# Patient Record
Sex: FEMALE | Race: WHITE | NOT HISPANIC OR LATINO | Employment: OTHER | URBAN - NONMETROPOLITAN AREA
[De-identification: names, ages, dates, MRNs, and addresses within clinical notes are randomized per-mention and may not be internally consistent; named-entity substitution may affect disease eponyms.]

---

## 2017-01-03 ENCOUNTER — ANESTHESIA EVENT (OUTPATIENT)
Dept: GASTROENTEROLOGY | Facility: HOSPITAL | Age: 67
End: 2017-01-03

## 2017-01-04 ENCOUNTER — ANESTHESIA (OUTPATIENT)
Dept: GASTROENTEROLOGY | Facility: HOSPITAL | Age: 67
End: 2017-01-04

## 2017-01-04 PROCEDURE — 25010000002 PROPOFOL 10 MG/ML EMULSION: Performed by: NURSE ANESTHETIST, CERTIFIED REGISTERED

## 2017-01-04 RX ORDER — PROPOFOL 10 MG/ML
VIAL (ML) INTRAVENOUS AS NEEDED
Status: DISCONTINUED | OUTPATIENT
Start: 2017-01-04 | End: 2017-01-04 | Stop reason: SURG

## 2017-01-04 RX ORDER — LIDOCAINE HYDROCHLORIDE 20 MG/ML
INJECTION, SOLUTION INFILTRATION; PERINEURAL AS NEEDED
Status: DISCONTINUED | OUTPATIENT
Start: 2017-01-04 | End: 2017-01-04 | Stop reason: SURG

## 2017-01-04 RX ADMIN — PROPOFOL 20 MG: 10 INJECTION, EMULSION INTRAVENOUS at 10:51

## 2017-01-04 RX ADMIN — LIDOCAINE HYDROCHLORIDE 40 MG: 20 INJECTION, SOLUTION INFILTRATION; PERINEURAL at 10:49

## 2017-01-04 RX ADMIN — PROPOFOL 50 MG: 10 INJECTION, EMULSION INTRAVENOUS at 10:50

## 2017-01-04 RX ADMIN — PROPOFOL 30 MG: 10 INJECTION, EMULSION INTRAVENOUS at 10:52

## 2017-01-04 RX ADMIN — PROPOFOL 100 MG: 10 INJECTION, EMULSION INTRAVENOUS at 10:49

## 2017-01-04 NOTE — ANESTHESIA PREPROCEDURE EVALUATION
Anesthesia Evaluation     Patient summary reviewed and Nursing notes reviewed    No history of anesthetic complications   Airway   Mallampati: II  no difficulty expected  Dental      Pulmonary    Cardiovascular   Exercise tolerance: good (4-7 METS)  (+) hypertension, past MI  >12 months, CAD, CABG > 6 Months, cardiac stents more than 12 months ago     Patient on routine beta blocker and Beta blocker given within 24 hours of surgery  ROS comment: Echo reviewed from 10/2016. EF 50-60% mild pulm htn.    Neuro/Psych  (+) psychiatric history Anxiety,    GI/Hepatic/Renal/Endo    (+)  GERD poorly controlled, diabetes mellitus,     Musculoskeletal     Abdominal    Substance History - negative use     OB/GYN negative ob/gyn ROS         Other   (+) arthritis                        Anesthesia Plan    ASA 3     general and MAC     intravenous induction   Anesthetic plan and risks discussed with patient.

## 2017-01-04 NOTE — ANESTHESIA POSTPROCEDURE EVALUATION
Patient: Linda JOHNSON [facility]    Procedure Summary     Date Anesthesia Start Anesthesia Stop Room / Location    01/04/17 1039 1058 [facility] ENDOSCOPY 5 / [facility] ENDOSCOPY       Procedure Diagnosis Surgeon Provider    ESOPHAGOGASTRODUODENOSCOPY WITH ANESTHESIA (N/A Esophagus) Nausea  (Nausea [R11.0]) DO Vaishali Hilliard CRNA          Anesthesia Type: general, MAC  Last vitals  BP      Temp      Pulse     Resp      SpO2        Post Anesthesia Care and Evaluation    Patient location during evaluation: bedside  Patient participation: waiting for patient participation  Level of consciousness: sleepy but conscious  Pain score: 0  Pain management: adequate  Airway patency: patent  Anesthetic complications: no  Cardiovascular status: acceptable  Respiratory status: acceptable  Hydration status: acceptable

## 2017-01-20 ENCOUNTER — OFFICE VISIT (OUTPATIENT)
Dept: INTERNAL MEDICINE | Facility: CLINIC | Age: 67
End: 2017-01-20

## 2017-01-20 VITALS
WEIGHT: 182.4 LBS | OXYGEN SATURATION: 98 % | DIASTOLIC BLOOD PRESSURE: 78 MMHG | HEIGHT: 66 IN | BODY MASS INDEX: 29.32 KG/M2 | HEART RATE: 51 BPM | RESPIRATION RATE: 17 BRPM | SYSTOLIC BLOOD PRESSURE: 132 MMHG

## 2017-01-20 DIAGNOSIS — K21.9 GASTROESOPHAGEAL REFLUX DISEASE WITHOUT ESOPHAGITIS: ICD-10-CM

## 2017-01-20 DIAGNOSIS — E55.9 VITAMIN D DEFICIENCY: ICD-10-CM

## 2017-01-20 DIAGNOSIS — E11.42 TYPE 2 DIABETES MELLITUS WITH DIABETIC POLYNEUROPATHY, WITHOUT LONG-TERM CURRENT USE OF INSULIN (HCC): ICD-10-CM

## 2017-01-20 DIAGNOSIS — Z00.00 ENCOUNTER FOR PREVENTIVE HEALTH EXAMINATION: ICD-10-CM

## 2017-01-20 DIAGNOSIS — E78.2 MIXED HYPERLIPIDEMIA: ICD-10-CM

## 2017-01-20 DIAGNOSIS — I10 ESSENTIAL HYPERTENSION: Primary | ICD-10-CM

## 2017-01-20 DIAGNOSIS — F41.9 ANXIETY: ICD-10-CM

## 2017-01-20 DIAGNOSIS — K27.9 PEPTIC ULCER: ICD-10-CM

## 2017-01-20 DIAGNOSIS — F51.01 PRIMARY INSOMNIA: ICD-10-CM

## 2017-01-20 DIAGNOSIS — I25.118 CORONARY ARTERY DISEASE OF NATIVE ARTERY OF NATIVE HEART WITH STABLE ANGINA PECTORIS (HCC): ICD-10-CM

## 2017-01-20 PROCEDURE — 99214 OFFICE O/P EST MOD 30 MIN: CPT | Performed by: INTERNAL MEDICINE

## 2017-01-20 RX ORDER — CHOLECALCIFEROL (VITAMIN D3) 125 MCG
5 CAPSULE ORAL NIGHTLY PRN
Qty: 90 TABLET | Refills: 3 | Status: SHIPPED | OUTPATIENT
Start: 2017-01-20 | End: 2017-12-28

## 2017-01-20 RX ORDER — BLOOD-GLUCOSE METER
1 KIT MISCELLANEOUS 2 TIMES DAILY
Qty: 1 EACH | Refills: 11 | Status: SHIPPED | OUTPATIENT
Start: 2017-01-20 | End: 2018-09-19 | Stop reason: SDUPTHER

## 2017-01-20 RX ORDER — LORAZEPAM 2 MG/1
TABLET ORAL
Refills: 0 | COMMUNITY
Start: 2016-12-08 | End: 2017-01-20

## 2017-01-20 NOTE — PROGRESS NOTES
"CC: Follow-up hypertension and abdominal pain    History:  Linda Pickens is a 66 y.o. female who presents today for follow-up for evaluation of the above:  She reports she has been doing reasonably well, though she recently went through a long period of nausea and abdominal pain.  She [place] which revealed a mass in the abdomen, which she states she was told was almost certainly malignant.  However, local workup with imaging with both CT and MRI, which was reviewed, did not reveal anything to this  nausea degree.  However, she did have nausea and abdominal pain, which have since resolved.  However, she states \"everything in my body has changed.\"  She no longer enjoys the food she previously did.  However, she does not have the hot flashes she had prior to this illness.  She reports her blood pressure has been well controlled on her current medications without side effects and she also continues Lipitor for her lipids.      ROS:  Review of Systems   Constitutional: Negative for chills and fever.   HENT: Negative for congestion and sore throat.    Respiratory: Negative for cough and shortness of breath.    Cardiovascular: Negative for chest pain and palpitations.   Gastrointestinal: Negative for abdominal pain, constipation and nausea.       Ms. Pickens  reports that she quit smoking about 5 years ago. Her smoking use included Cigarettes. She does not have any smokeless tobacco history on file. She reports that she does not drink alcohol or use illicit drugs.      Current Outpatient Prescriptions:   •  amLODIPine (NORVASC) 10 MG tablet, Take 1 tablet by mouth Daily., Disp: 90 tablet, Rfl: 1  •  atenolol (TENORMIN) 50 MG tablet, Take 1 tablet by mouth Daily., Disp: 90 tablet, Rfl: 1  •  atorvastatin (LIPITOR) 40 MG tablet, Take 1 tablet by mouth Daily., Disp: 90 tablet, Rfl: 1  •  Blood Glucose Monitoring Suppl (ONE TOUCH ULTRA MINI) W/DEVICE kit, Inject 1 strip under the " "skin 2 (Two) Times a Day., Disp: , Rfl: 0  •  CloNIDine (CATAPRES) 0.1 MG tablet, Take 1 tablet by mouth 2 (Two) Times a Day., Disp: 180 tablet, Rfl: 1  •  clopidogrel (PLAVIX) 75 MG tablet, Take 1 tablet by mouth Daily., Disp: 90 tablet, Rfl: 1  •  hydrochlorothiazide (HYDRODIURIL) 25 MG tablet, Take 1 tablet by mouth Daily., Disp: 90 tablet, Rfl: 1  •  metFORMIN (GLUCOPHAGE) 1000 MG tablet, Take 1,000 mg by mouth 2 (Two) Times a Day With Meals., Disp: , Rfl:   •  nitroglycerin (NITROLINGUAL) 0.4 MG/SPRAY spray, Place 1 spray under the tongue Every 5 (Five) Minutes As Needed for chest pain., Disp: 12 g, Rfl: 2  •  ONE TOUCH ULTRA TEST test strip, 1 each by Other route 2 (Two) Times a Day., Disp: 180 each, Rfl: 1  •  pantoprazole (PROTONIX) 40 MG EC tablet, Take 1 tablet by mouth Daily., Disp: 90 tablet, Rfl: 1  •  venlafaxine (EFFEXOR) 37.5 MG tablet, Take 1 tablet by mouth 2 (Two) Times a Day., Disp: 180 tablet, Rfl: 1  •  aspirin 81 MG EC tablet, Take 1 tablet by mouth Daily., Disp: 90 tablet, Rfl: 1  •  LORazepam (ATIVAN) 2 MG tablet, , Disp: , Rfl: 0  •  promethazine (PHENERGAN) 25 MG tablet, Take 1 tablet by mouth Every 6 (Six) Hours As Needed for nausea or vomiting., Disp: 30 tablet, Rfl: 2      OBJECTIVE:  Visit Vitals   • /78 (BP Location: Left arm, Patient Position: Sitting, Cuff Size: Adult)   • Pulse 51   • Resp 17   • Ht 66\" (167.6 cm)   • Wt 182 lb 6.4 oz (82.7 kg)   • SpO2 98%   • BMI 29.44 kg/m2      Physical Exam   Constitutional: She is oriented to person, place, and time. She appears well-nourished. No distress.   Cardiovascular: Normal rate, regular rhythm and normal heart sounds.    No murmur heard.  Pulmonary/Chest: Effort normal and breath sounds normal. She has no wheezes.   Abdominal: Soft. There is no tenderness.   Neurological: She is alert and oriented to person, place, and time. Gait normal.   Psychiatric: She has a normal mood and affect.       Assessment/Plan    Diagnoses and all " orders for this visit:    Essential hypertension  -     Basic Metabolic Panel; Future  Well controlled, BP goal for age is <140/90 per JNC 8 guidelines and continue current medications    Mixed hyperlipidemia  -     Lipid Panel; Future  Currently on atorvastatin at 40 mg, which represents moderate to high intensity therapy per ACC/AHA guidelines.  We will plan lipid panel prior to her next visit.    Gastroesophageal reflux disease without esophagitis  Peptic ulcer  She has not been taking her PPI on a regular basis, though I recommended strongly that she do this to allow healing of her peptic ulcers as seen on EGD recently.  She expressed understanding.    Anxiety  She reports the Effexor is not controlling her anxiety as well as Zoloft did in the past.  However, she recently filled her Effexor and does not wish to waste this medication.  I recommended she continue to take her current dose daily as she has been doing, but to try weaning her last few doses to half tablets or every other day dosing to wean off.  Following this, we will attempt a drug holiday to see if her symptoms are well controlled without pharmacologic intervention.  However, if she requires treatment in the future, we would consider Zoloft, given its good profile for anxiety as well as good tolerance for her in the past.    Type 2 diabetes mellitus with diabetic polyneuropathy, without long-term current use of insulin  -     Hemoglobin A1c; Future  -     Blood Glucose Monitoring Suppl (ONE TOUCH ULTRA MINI) W/DEVICE kit; Inject 1 strip under the skin 2 (Two) Times a Day.  A1c was 7.9% in August 2016.  She does continue on metformin as monotherapy.  We will recheck A1c to determine need for ongoing or increased pharmacologic therapy.  She is on statin therapy and her blood pressure is at goal as above.    Vitamin D deficiency  -     Vitamin D 25 Hydroxy; Future  Given history of deficiency, though no current pharmacologic supplementation, we will  check current level to guide us in the need for further pharmacologic intervention.    Primary insomnia  -     melatonin 5 MG tablet tablet; Take 1 tablet by mouth At Night As Needed (insomnia).  She reports difficulties with sleeping.  She has taken Advil PM in the past for sleeping, though Advil is contraindicated given her recent discovery of peptic ulcers.  I recommended melatonin to be taken on a when necessary basis for insomnia and she may also use Benadryl as needed.  She has used a over-the-counter sleep aid, which she could continue as long as it is not NSAID or Benadryl based to prevent interactions with the recommendations given above.    Coronary artery disease of native artery of native heart with stable angina pectoris  I recommended she begin taking her PPI on a regular basis, and after 5-7 days, she may return to aspirin and Plavix therapy given her history of coronary disease.  She had to use 1 dose of nitroglycerin about 2 weeks ago, though that is rare and her angina resolved promptly.  She is also on statin and beta blocker therapy.    Encounter for preventive health examination  -     Hepatitis C Antibody; Future        An After Visit Summary was printed and given to the patient at discharge.  Return in about 4 months (around 5/20/2017) for Recheck. Sooner if problems arise.         Johny Lehman D.O. 1/20/2017

## 2017-01-20 NOTE — MR AVS SNAPSHOT
Linda JOHNSON [facility]   1/20/2017 10:30 AM   Office Visit    Dept Phone:  [phone]   Encounter #:  [id number]    Provider:  Jonhy Lehman DO   Department:  [facility]                Your Full Care Plan              Today's Medication Changes          These changes are accurate as of: 1/20/17 11:19 AM.  If you have any questions, ask your nurse or doctor.               New Medication(s)Ordered:     melatonin 5 MG tablet tablet   Take 1 tablet by mouth At Night As Needed (insomnia).   Started by:  Johny Lehman DO         Stop taking medication(s)listed here:     CloNIDine 0.1 MG tablet   Commonly known as:  CATAPRES   Stopped by:  Johny Lehman DO           LORazepam 2 MG tablet   Commonly known as:  ATIVAN   Stopped by:  Johny Lehman DO           promethazine 25 MG tablet   Commonly known as:  PHENERGAN   Stopped by:  Johny Lehman DO                Where to Get Your Medications      These medications were sent to [facility] [place] [place] - [address] [facility] - [phone] [facility] [phone]   [address] [place] [address]     Phone:  [phone]     melatonin 5 MG tablet tablet    ONE TOUCH ULTRA MINI W/DEVICE kit                  Your Updated Medication List          This list is accurate as of: 1/20/17 11:19 AM.  Always use your most recent med list.                amLODIPine 10 MG tablet   Commonly known as:  NORVASC   Take 1 tablet by mouth Daily.       aspirin 81 MG EC tablet   Take 1 tablet by mouth Daily.       atenolol 50 MG tablet   Commonly known as:  TENORMIN   Take 1 tablet by mouth Daily.       atorvastatin 40 MG tablet   Commonly known as:  LIPITOR   Take 1 tablet by mouth Daily.       clopidogrel 75 MG tablet   Commonly known as:  PLAVIX   Take 1 tablet by mouth Daily.       hydrochlorothiazide 25 MG tablet   Commonly known as:  HYDRODIURIL   Take 1 tablet by mouth Daily.       melatonin  5 MG tablet tablet   Take 1 tablet by mouth At Night As Needed (insomnia).       metFORMIN 1000 MG tablet   Commonly known as:  GLUCOPHAGE       nitroglycerin 0.4 MG/SPRAY spray   Commonly known as:  NITROLINGUAL   Place 1 spray under the tongue Every 5 (Five) Minutes As Needed for chest pain.       ONE TOUCH ULTRA MINI W/DEVICE kit   Inject 1 strip under the skin 2 (Two) Times a Day.       ONE TOUCH ULTRA TEST test strip   Generic drug:  glucose blood   1 each by Other route 2 (Two) Times a Day.       pantoprazole 40 MG EC tablet   Commonly known as:  PROTONIX   Take 1 tablet by mouth Daily.       venlafaxine 37.5 MG tablet   Commonly known as:  EFFEXOR   Take 1 tablet by mouth 2 (Two) Times a Day.               You Were Diagnosed With        Codes Comments    Mixed hyperlipidemia    -  Primary ICD-10-CM: E78.2  ICD-9-CM: 272.2     Essential hypertension     ICD-10-CM: I10  ICD-9-CM: 401.9     Gastroesophageal reflux disease without esophagitis     ICD-10-CM: K21.9  ICD-9-CM: 530.81     Anxiety     ICD-10-CM: F41.9  ICD-9-CM: 300.00     Type 2 diabetes mellitus with diabetic polyneuropathy, without long-term current use of insulin     ICD-10-CM: E11.42  ICD-9-CM: 250.60, 357.2     Vitamin D deficiency     ICD-10-CM: E55.9  ICD-9-CM: 268.9     Primary insomnia     ICD-10-CM: F51.01  ICD-9-CM: 307.42     Coronary artery disease of native artery of native heart with stable angina pectoris     ICD-10-CM: I25.118  ICD-9-CM: 414.01, 413.9     Encounter for preventive health examination     ICD-10-CM: Z00.00  ICD-9-CM: V70.0       Instructions     None    Patient Instructions History      Upcoming Appointments     Visit Type Date Time Department    FOLLOW UP 1/20/2017 10:30 AM [facility]    FOLLOW UP 2/13/2017  2:00 PM [facility]    FOLLOW UP 4/21/2017 10:30 AM [facility]    FOLLOW UP 5/22/2017 10:00 AM [facility]      MyChart Signup     Our records indicate that you have an active [facility]  "[employer] account.    You can view your After Visit Summary by going to [facility] and logging in with your [employer] username and password.  If you don't have a [employer] username and password but a parent or guardian has access to your record, the parent or guardian should login with their own [employer] username and password and access your record to view the After Visit Summary.    If you have questions, you can email [email or web address] call [phone] to talk to our [employer] staff.  Remember, [employer] is NOT to be used for urgent needs.  For medical emergencies, dial 911.               Other Info from Your Visit           Your Appointments     Feb 13, 2017  2:00 PM CST   Follow Up with Maico Shelton DO   [facility] GASTROENTEROLOGY (--)    [address]  [place] [address] [phone]           Arrive 15 minutes prior to appointment.            Apr 21, 2017 10:30 AM CDT   Follow Up with Johny Lehman DO   [facility] FAMILY MEDICINE (--)    [address]  [place] [address] [phone]           Arrive 15 minutes prior to appointment.            May 22, 2017 10:00 AM CDT   Follow Up with Carlos Mak MD   [facility] (--)    [address]  [place] [id number] [phone]           Arrive 15 minutes prior to appointment.              Allergies     Demerol [Meperidine]  GI Intolerance    Chantix [Varenicline]  Hallucinations    Meperidine And Related  GI Intolerance    Latex  Rash      Vital Signs     Blood Pressure Pulse Respirations Height Weight Oxygen Saturation    132/78 (BP Location: Left arm, Patient Position: Sitting, Cuff Size: Adult) 51 17 66\" (167.6 cm) 182 lb 6.4 oz (82.7 kg) 98%    Body Mass Index Smoking Status                29.44 kg/m2 Former Smoker          Problems and Diagnoses Noted     Anxiety problem    Coronary heart disease    High blood pressure "    Acid reflux disease    Mixed hyperlipidemia    Ulcer    Type 2 diabetes mellitus with diabetic polyneuropathy, without long-term current use of insulin    Vitamin D deficiency        Difficulty falling or staying asleep        Preventative health care

## 2017-02-07 ENCOUNTER — LAB (OUTPATIENT)
Dept: LAB | Facility: HOSPITAL | Age: 67
End: 2017-02-07

## 2017-02-07 DIAGNOSIS — I10 ESSENTIAL HYPERTENSION: ICD-10-CM

## 2017-02-07 DIAGNOSIS — E78.2 MIXED HYPERLIPIDEMIA: ICD-10-CM

## 2017-02-07 DIAGNOSIS — E55.9 VITAMIN D DEFICIENCY: ICD-10-CM

## 2017-02-07 DIAGNOSIS — Z00.00 ENCOUNTER FOR PREVENTIVE HEALTH EXAMINATION: ICD-10-CM

## 2017-02-07 DIAGNOSIS — E11.42 TYPE 2 DIABETES MELLITUS WITH DIABETIC POLYNEUROPATHY, WITHOUT LONG-TERM CURRENT USE OF INSULIN (HCC): ICD-10-CM

## 2017-02-07 LAB
25(OH)D3 SERPL-MCNC: 54.5 NG/ML (ref 30–100)
ANION GAP SERPL CALCULATED.3IONS-SCNC: 10 MMOL/L (ref 4–13)
ARTICHOKE IGE QN: 97 MG/DL (ref 0–99)
BUN BLD-MCNC: 12 MG/DL (ref 5–21)
BUN/CREAT SERPL: 17.4 (ref 7–25)
CALCIUM SPEC-SCNC: 9.1 MG/DL (ref 8.4–10.4)
CHLORIDE SERPL-SCNC: 100 MMOL/L (ref 98–110)
CHOLEST SERPL-MCNC: 157 MG/DL (ref 130–200)
CO2 SERPL-SCNC: 32 MMOL/L (ref 24–31)
CREAT BLD-MCNC: 0.69 MG/DL (ref 0.5–1.4)
GFR SERPL CREATININE-BSD FRML MDRD: 86 ML/MIN/1.73
GLUCOSE BLD-MCNC: 173 MG/DL (ref 70–100)
HBA1C MFR BLD: 6.7 %
HCV AB SER DONR QL: NEGATIVE
HCV S/C RATIO: 0.04 (ref 0–0.99)
HDLC SERPL-MCNC: 37 MG/DL
LDLC/HDLC SERPL: 2.58 {RATIO}
POTASSIUM BLD-SCNC: 3.9 MMOL/L (ref 3.5–5.3)
SODIUM BLD-SCNC: 142 MMOL/L (ref 135–145)
TRIGL SERPL-MCNC: 123 MG/DL (ref 0–149)

## 2017-02-07 PROCEDURE — 82306 VITAMIN D 25 HYDROXY: CPT | Performed by: INTERNAL MEDICINE

## 2017-02-07 PROCEDURE — 36415 COLL VENOUS BLD VENIPUNCTURE: CPT

## 2017-02-07 PROCEDURE — 83036 HEMOGLOBIN GLYCOSYLATED A1C: CPT | Performed by: INTERNAL MEDICINE

## 2017-02-07 PROCEDURE — 86803 HEPATITIS C AB TEST: CPT | Performed by: INTERNAL MEDICINE

## 2017-02-07 PROCEDURE — 80061 LIPID PANEL: CPT | Performed by: INTERNAL MEDICINE

## 2017-02-07 PROCEDURE — 80048 BASIC METABOLIC PNL TOTAL CA: CPT | Performed by: INTERNAL MEDICINE

## 2017-02-13 ENCOUNTER — DOCUMENTATION (OUTPATIENT)
Dept: INTERNAL MEDICINE | Facility: CLINIC | Age: 67
End: 2017-02-13

## 2017-02-13 ENCOUNTER — OFFICE VISIT (OUTPATIENT)
Dept: GASTROENTEROLOGY | Facility: CLINIC | Age: 67
End: 2017-02-13

## 2017-02-13 VITALS
DIASTOLIC BLOOD PRESSURE: 80 MMHG | RESPIRATION RATE: 17 BRPM | HEART RATE: 61 BPM | WEIGHT: 181.8 LBS | BODY MASS INDEX: 29.22 KG/M2 | OXYGEN SATURATION: 99 % | SYSTOLIC BLOOD PRESSURE: 170 MMHG | HEIGHT: 66 IN

## 2017-02-13 DIAGNOSIS — K21.00 GASTROESOPHAGEAL REFLUX DISEASE WITH ESOPHAGITIS: Primary | ICD-10-CM

## 2017-02-13 DIAGNOSIS — K25.9 GASTRIC ULCER, UNSPECIFIED CHRONICITY: ICD-10-CM

## 2017-02-13 DIAGNOSIS — I10 ESSENTIAL HYPERTENSION: Primary | ICD-10-CM

## 2017-02-13 PROCEDURE — 99213 OFFICE O/P EST LOW 20 MIN: CPT | Performed by: INTERNAL MEDICINE

## 2017-02-13 RX ORDER — LISINOPRIL 20 MG/1
20 TABLET ORAL DAILY
Qty: 30 TABLET | Refills: 5 | Status: SHIPPED | OUTPATIENT
Start: 2017-02-13 | End: 2017-04-21 | Stop reason: SDUPTHER

## 2017-02-13 NOTE — PROGRESS NOTES
Pt called and stated after she stopped her Clonidine, her BP has been quite elevated. We will start Lisinopril per orders. We will attempt to avoid further long-term therapy with Clonidine.

## 2017-02-13 NOTE — PROGRESS NOTES
"Chief Complaint   Patient presents with   • Post-op     1 month       Subjective     HPI     EGD 1/4/17 for evaluation of heartburn/indigestion/vomiting.  EGD showed esophagitis and non bleeding gastric ulcers.  H Pylori negative.  EGD reviewed with pt.  She reports improvement in emesis.  \"I feel like new person.\"  Currently taking Protonix on regular basis.  She has stopped use of NSAIDs and has not resumed ASA at this time.  Cardio is recommending restarting use of ASA.  Increase abdominal pain with caffeine consumption.  No melena/BRBPR.    Past Medical History   Diagnosis Date   • Anxiety    • Arthritis    • CAD (coronary artery disease)    • CHF (congestive heart failure)    • Depression    • Diabetes mellitus    • HTN (hypertension)    • PONV (postoperative nausea and vomiting)    • Vitamin D deficiency        Past Surgical History   Procedure Laterality Date   • Appendectomy     • Coronary artery bypass graft  1992     x 2   • Tonsillectomy     • Hernia repair     • Shoulder surgery     • Coronary stent placement  2014     X 5   • Cardiac catheterization       [place]   • Endoscopy N/A 1/4/2017     Procedure: ESOPHAGOGASTRODUODENOSCOPY WITH ANESTHESIA;  Surgeon: Maico Shelton DO;  Location: [facility] ENDOSCOPY;  Service:        Outpatient Prescriptions Marked as Taking for the 2/13/17 encounter (Office Visit) with Maico Shelton DO   Medication Sig Dispense Refill   • amLODIPine (NORVASC) 10 MG tablet Take 1 tablet by mouth Daily. 90 tablet 1   • aspirin 81 MG EC tablet Take 1 tablet by mouth Daily. 90 tablet 1   • atenolol (TENORMIN) 50 MG tablet Take 1 tablet by mouth Daily. 90 tablet 1   • atorvastatin (LIPITOR) 40 MG tablet Take 1 tablet by mouth Daily. 90 tablet 1   • Blood Glucose Monitoring Suppl (ONE TOUCH ULTRA MINI) W/DEVICE kit Inject 1 strip under the skin 2 (Two) Times a Day. 1 each 11   • clopidogrel (PLAVIX) 75 MG tablet Take 1 tablet by mouth Daily. 90 tablet 1   • " hydrochlorothiazide (HYDRODIURIL) 25 MG tablet Take 1 tablet by mouth Daily. 90 tablet 1   • melatonin 5 MG tablet tablet Take 1 tablet by mouth At Night As Needed (insomnia). 90 tablet 3   • nitroglycerin (NITROLINGUAL) 0.4 MG/SPRAY spray Place 1 spray under the tongue Every 5 (Five) Minutes As Needed for chest pain. 12 g 2   • ONE TOUCH ULTRA TEST test strip 1 each by Other route 2 (Two) Times a Day. 180 each 1   • pantoprazole (PROTONIX) 40 MG EC tablet Take 1 tablet by mouth Daily. 90 tablet 1   • venlafaxine (EFFEXOR) 37.5 MG tablet Take 1 tablet by mouth 2 (Two) Times a Day. 180 tablet 1       Allergies   Allergen Reactions   • Demerol [Meperidine] GI Intolerance   • Chantix [Varenicline] Hallucinations   • Meperidine And Related GI Intolerance   • Latex Rash       Social History     Social History   • Marital status:      Spouse name: N/A   • Number of children: N/A   • Years of education: N/A     Occupational History   • Not on file.     Social History Main Topics   • Smoking status: Former Smoker     Types: Cigarettes     Quit date: 1/1/2012   • Smokeless tobacco: Not on file   • Alcohol use No   • Drug use: No   • Sexual activity: Defer     Other Topics Concern   • Not on file     Social History Narrative       Family History   Problem Relation Age of Onset   • Breast cancer Mother    • Lung cancer Mother    • No Known Problems Father    • Arrhythmia Maternal Grandmother    • Heart disease Paternal Grandmother    • Diabetes Paternal Grandmother    • Heart disease Paternal Grandfather    • Diabetes Paternal Grandfather    • Lung cancer Maternal Grandfather    • Colon cancer Neg Hx    • Colon polyps Neg Hx    • Liver cancer Neg Hx    • Liver disease Neg Hx        Review of Systems   Constitutional: Negative for fatigue, fever and unexpected weight change.   HENT: Negative for hearing loss, sore throat and voice change.    Eyes: Negative for visual disturbance.   Respiratory: Negative for cough,  "shortness of breath and wheezing.    Cardiovascular: Negative for chest pain and palpitations.   Gastrointestinal: Negative for abdominal pain, blood in stool and vomiting.   Endocrine: Negative for polydipsia and polyuria.   Genitourinary: Negative for difficulty urinating, dysuria, hematuria and urgency.   Musculoskeletal: Negative for joint swelling and myalgias.   Skin: Negative for color change, rash and wound.   Neurological: Negative for dizziness, tremors, seizures and syncope.   Hematological: Does not bruise/bleed easily.   Psychiatric/Behavioral: Negative for agitation and confusion. The patient is not nervous/anxious.        Objective     Vitals:    02/13/17 1408   BP: 170/80   BP Location: Left arm   Patient Position: Sitting   Cuff Size: Adult   Pulse: 61   Resp: 17   SpO2: 99%   Weight: 181 lb 12.8 oz (82.5 kg)   Height: 66\" (167.6 cm)     Body mass index is 29.34 kg/(m^2).    Physical Exam   Constitutional: She is oriented to person, place, and time. She appears well-developed and well-nourished.   HENT:   Head: Normocephalic and atraumatic.   Eyes: Conjunctivae are normal. Pupils are equal, round, and reactive to light. No scleral icterus.   Neck: No JVD present. No thyroid mass and no thyromegaly present.   Cardiovascular: Normal rate, regular rhythm and normal heart sounds.  Exam reveals no gallop and no friction rub.    No murmur heard.  Pulmonary/Chest: Effort normal and breath sounds normal. No accessory muscle usage. No respiratory distress. She has no wheezes. She has no rales.   Abdominal: Soft. Bowel sounds are normal. She exhibits no distension, no ascites and no mass. There is no splenomegaly or hepatomegaly. There is no tenderness. There is no rebound and no guarding.   Genitourinary:   Genitourinary Comments: Rectal-Did not examine   Musculoskeletal: Normal range of motion. She exhibits no edema.   Neurological: She is alert and oriented to person, place, and time.   Deemed a reliable " historian, able to converse without difficulty and able to move all extremities without difficulty   Skin: Skin is warm and dry.   Psychiatric: She has a normal mood and affect. Her behavior is normal.       Imaging Results (most recent)     None          Assessment/Plan     Linda was seen today for post-op.    Diagnoses and all orders for this visit:    Gastroesophageal reflux disease with esophagitis    Gastric ulcer, unspecified chronicity      Avoid caffeine  Cont daily Protonix  Ok resume ASA  GERD handout given to patient    * Surgery not found *    There are no Patient Instructions on file for this visit.

## 2017-02-13 NOTE — PROGRESS NOTES
Patient comes in to the office stating that her blood pressure has been running high since stopping Clonidine. Values of 02/13/2017   179/90. 02/11/2017  159/74,  02/10/2017  187/94 and 01/31/2017 179/93.  Discussed with Dr Lehman  Patient to start Lisinopril.  Patient notified to keep record of blood pressure for next two weeks.

## 2017-03-09 ENCOUNTER — HOSPITAL ENCOUNTER (EMERGENCY)
Facility: HOSPITAL | Age: 67
Discharge: LEFT BEFORE DISCHARGE | End: 2017-03-09

## 2017-03-09 VITALS
DIASTOLIC BLOOD PRESSURE: 87 MMHG | RESPIRATION RATE: 16 BRPM | BODY MASS INDEX: 28.93 KG/M2 | HEART RATE: 76 BPM | HEIGHT: 66 IN | WEIGHT: 180 LBS | TEMPERATURE: 97.1 F | OXYGEN SATURATION: 97 % | SYSTOLIC BLOOD PRESSURE: 109 MMHG

## 2017-03-09 PROCEDURE — 99211 OFF/OP EST MAY X REQ PHY/QHP: CPT

## 2017-03-10 NOTE — ED NOTES
Pt states that she just wants something to open up her throat, pt states that she is having difficulty swollowing     Lissa Nino, PATTI  03/09/17 7042

## 2017-03-10 NOTE — ED NOTES
"Upon doing assessment pt stated that she ate a type of chips that she had never ate before, pt states that she go nauseaed, stated that her \"throat was trying to close, and my eyes swelled shut\", pt states that she went to the bathroom and vomited and was beating on the floor to get her 's attention, pt stated that she felt very weak inside,     Lissa Nino RN  03/09/17 8382    "

## 2017-03-10 NOTE — ED NOTES
Pt wants to leave. Pt states that she thinks she is having a stroke and wants to go to [facility] due to her not being seen yet, explained to pt that I informed the providers of her symptoms and pt states that she doesn't want to wait any longer,      Lissa Nino, RN  03/09/17 2709

## 2017-03-23 DIAGNOSIS — E11.9 TYPE 2 DIABETES MELLITUS WITHOUT COMPLICATION, WITHOUT LONG-TERM CURRENT USE OF INSULIN (HCC): ICD-10-CM

## 2017-03-23 NOTE — TELEPHONE ENCOUNTER
----- Message from Chetna Bruno sent at 3/23/2017  2:42 PM CDT -----  Regarding: Refill request  Contact: [phone]  Call in to [facility]:     Refill on the One touch ultra test strips.

## 2017-04-21 ENCOUNTER — OFFICE VISIT (OUTPATIENT)
Dept: INTERNAL MEDICINE | Facility: CLINIC | Age: 67
End: 2017-04-21

## 2017-04-21 VITALS
RESPIRATION RATE: 16 BRPM | OXYGEN SATURATION: 97 % | DIASTOLIC BLOOD PRESSURE: 78 MMHG | HEART RATE: 69 BPM | BODY MASS INDEX: 30.01 KG/M2 | WEIGHT: 186.7 LBS | SYSTOLIC BLOOD PRESSURE: 142 MMHG | HEIGHT: 66 IN

## 2017-04-21 DIAGNOSIS — Z78.0 POSTMENOPAUSAL: ICD-10-CM

## 2017-04-21 DIAGNOSIS — F41.9 ANXIETY: ICD-10-CM

## 2017-04-21 DIAGNOSIS — K21.9 GASTROESOPHAGEAL REFLUX DISEASE WITHOUT ESOPHAGITIS: ICD-10-CM

## 2017-04-21 DIAGNOSIS — E78.2 MIXED HYPERLIPIDEMIA: ICD-10-CM

## 2017-04-21 DIAGNOSIS — I10 ESSENTIAL HYPERTENSION: ICD-10-CM

## 2017-04-21 DIAGNOSIS — I73.9 CLAUDICATION IN PERIPHERAL VASCULAR DISEASE (HCC): ICD-10-CM

## 2017-04-21 DIAGNOSIS — Z87.891 PERSONAL HISTORY OF NICOTINE DEPENDENCE: ICD-10-CM

## 2017-04-21 DIAGNOSIS — Z23 ENCOUNTER FOR IMMUNIZATION: ICD-10-CM

## 2017-04-21 DIAGNOSIS — Z12.2 ENCOUNTER FOR SCREENING FOR LUNG CANCER: ICD-10-CM

## 2017-04-21 DIAGNOSIS — I25.118 CORONARY ARTERY DISEASE OF NATIVE ARTERY OF NATIVE HEART WITH STABLE ANGINA PECTORIS (HCC): Primary | ICD-10-CM

## 2017-04-21 DIAGNOSIS — E11.42 TYPE 2 DIABETES MELLITUS WITH DIABETIC POLYNEUROPATHY, WITHOUT LONG-TERM CURRENT USE OF INSULIN (HCC): ICD-10-CM

## 2017-04-21 PROBLEM — E66.09 NON MORBID OBESITY DUE TO EXCESS CALORIES: Status: ACTIVE | Noted: 2017-04-21

## 2017-04-21 PROCEDURE — G0438 PPPS, INITIAL VISIT: HCPCS | Performed by: INTERNAL MEDICINE

## 2017-04-21 PROCEDURE — 90471 IMMUNIZATION ADMIN: CPT | Performed by: INTERNAL MEDICINE

## 2017-04-21 PROCEDURE — 90670 PCV13 VACCINE IM: CPT | Performed by: INTERNAL MEDICINE

## 2017-04-21 PROCEDURE — 99214 OFFICE O/P EST MOD 30 MIN: CPT | Performed by: INTERNAL MEDICINE

## 2017-04-21 RX ORDER — LISINOPRIL 20 MG/1
20 TABLET ORAL DAILY
Qty: 90 TABLET | Refills: 3 | Status: SHIPPED | OUTPATIENT
Start: 2017-04-21 | End: 2017-08-17

## 2017-04-21 RX ORDER — VENLAFAXINE HYDROCHLORIDE 75 MG/1
75 CAPSULE, EXTENDED RELEASE ORAL DAILY
Qty: 90 CAPSULE | Refills: 1 | Status: SHIPPED | OUTPATIENT
Start: 2017-04-21 | End: 2017-11-17 | Stop reason: DRUGHIGH

## 2017-04-21 RX ORDER — TIZANIDINE 4 MG/1
1 TABLET ORAL AS NEEDED
Refills: 2 | COMMUNITY
Start: 2017-03-09 | End: 2017-06-27

## 2017-04-21 NOTE — PROGRESS NOTES
QUICK REFERENCE INFORMATION:  The ABCs of the Annual Wellness Visit    Initial Medicare Wellness Visit    HEALTH RISK ASSESSMENT    1950    Recent Hospitalizations:  No recent hospitalization(s)..        Current Medical Providers:  Patient Care Team:  Johny Lehman DO as PCP - General (Internal Medicine)  Carlos Mak MD as Cardiologist (Cardiology)  Eb Capps MD as Consulting Physician (Ophthalmology)        Smoking Status:  History   Smoking Status   • Former Smoker   • Types: Cigarettes   • Quit date: 1/1/2012   Smokeless Tobacco   • Not on file       Alcohol Consumption:  History   Alcohol Use No       Depression Screen:   PHQ-9 Depression Screening 4/21/2017   Little interest or pleasure in doing things 1   Feeling down, depressed, or hopeless 0   Trouble falling or staying asleep, or sleeping too much 2   Feeling tired or having little energy 2   Poor appetite or overeating 1   Feeling bad about yourself - or that you are a failure or have let yourself or your family down 0   Trouble concentrating on things, such as reading the newspaper or watching television 1   Moving or speaking so slowly that other people could have noticed. Or the opposite - being so fidgety or restless that you have been moving around a lot more than usual 0   Thoughts that you would be better off dead, or of hurting yourself in some way 0   PHQ-9 Total Score 7   If you checked off any problems, how difficult have these problems made it for you to do your work, take care of things at home, or get along with other people? Not difficult at all       Health Habits and Functional and Cognitive Screening:  Functional & Cognitive Status 4/21/2017   Do you have difficulty preparing food and eating? No   Do you have difficulty bathing yourself? No   Do you have difficulty getting dressed? No   Do you have difficulty using the toilet? No   Do you have difficulty moving around from place to place? No   In the past year have you  fallen or experienced a near fall? No   Do you need help using the phone?  No   Are you deaf or do you have serious difficulty hearing?  No   Do you need help with transportation? No   Do you need help shopping? No   Do you need help preparing meals?  No   Do you need help with housework?  No   Do you need help with laundry? No   Do you need help taking your medications? No   Do you need help managing money? No   Do you have difficulty concentrating, remembering or making decisions? No       Health Habits  Current Diet: Unhealthy Diet  Dental Exam: Not up to date  Eye Exam: Not up to date  Exercise (times per week): 1 times per week  Current Exercise Activities Include: Yard Work          Does the patient have evidence of cognitive impairment? No    Asiprin use counseling: Taking ASA appropriately as indicated      Recent Lab Results:    Visual Acuity:  No exam data present    Age-appropriate Screening Schedule:  Refer to the list below for future screening recommendations based on patient's age, sex and/or medical conditions. Orders for these recommended tests are listed in the plan section. The patient has been provided with a written plan.    Health Maintenance   Topic Date Due   • TDAP/TD VACCINES (1 - Tdap) 02/20/1971   • PAP SMEAR  10/17/2016   • ZOSTER VACCINE  10/17/2016   • DIABETIC FOOT EXAM  10/20/2016   • DIABETIC EYE EXAM  10/20/2016   • COLONOSCOPY  10/20/2016   • PNEUMOCOCCAL VACCINES (65+ LOW/MEDIUM RISK) (1 of 2 - PCV13) 03/16/2017   • HEMOGLOBIN A1C  08/07/2017   • URINE MICROALBUMIN  08/22/2017   • LIPID PANEL  02/07/2018   • MAMMOGRAM  03/07/2018   • INFLUENZA VACCINE  Completed        Subjective   History of Present Illness    Linda ELIZABETH Pickens is a 67 y.o. female who presents for an Annual Wellness Visit.    The following portions of the patient's history were reviewed and updated as appropriate: allergies, current medications, past family history, past medical history, past social history, past  surgical history and problem list.    Outpatient Medications Prior to Visit   Medication Sig Dispense Refill   • amLODIPine (NORVASC) 10 MG tablet Take 1 tablet by mouth Daily. 90 tablet 1   • aspirin 81 MG EC tablet Take 1 tablet by mouth Daily. 90 tablet 1   • atenolol (TENORMIN) 50 MG tablet Take 1 tablet by mouth Daily. 90 tablet 1   • atorvastatin (LIPITOR) 40 MG tablet Take 1 tablet by mouth Daily. 90 tablet 1   • Blood Glucose Monitoring Suppl (ONE TOUCH ULTRA MINI) W/DEVICE kit Inject 1 strip under the skin 2 (Two) Times a Day. 1 each 11   • clopidogrel (PLAVIX) 75 MG tablet Take 1 tablet by mouth Daily. 90 tablet 1   • hydrochlorothiazide (HYDRODIURIL) 25 MG tablet Take 1 tablet by mouth Daily. 90 tablet 1   • melatonin 5 MG tablet tablet Take 1 tablet by mouth At Night As Needed (insomnia). 90 tablet 3   • nitroglycerin (NITROLINGUAL) 0.4 MG/SPRAY spray Place 1 spray under the tongue Every 5 (Five) Minutes As Needed for chest pain. 12 g 2   • ONE TOUCH ULTRA TEST test strip 1 each by Other route 2 (Two) Times a Day. 180 each 1   • pantoprazole (PROTONIX) 40 MG EC tablet Take 1 tablet by mouth Daily. 90 tablet 1   • lisinopril (PRINIVIL,ZESTRIL) 20 MG tablet Take 1 tablet by mouth Daily. 30 tablet 5   • metFORMIN (GLUCOPHAGE) 1000 MG tablet Take 1,000 mg by mouth 2 (Two) Times a Day With Meals.     • venlafaxine (EFFEXOR) 37.5 MG tablet Take 1 tablet by mouth 2 (Two) Times a Day. 180 tablet 1     No facility-administered medications prior to visit.        Patient Active Problem List   Diagnosis   • Gastroesophageal reflux disease without esophagitis   • Type 2 diabetes mellitus with diabetic polyneuropathy, without long-term current use of insulin   • Essential hypertension   • Mixed hyperlipidemia   • Peptic ulcer   • CAD (coronary artery disease)   • Anxiety       Advance Care Planning:  has NO advance directive - information provided to the patient today    Identification of Risk Factors:  Risk factors  "include: weight , unhealthy diet, cardiovascular risk, inactivity, chronic pain and depression.    Review of Systems See  note      Compared to one year ago, the patient feels her physical health is worse.  Compared to one year ago, the patient feels her mental health is the same.    Objective     Physical Exam See  note      Vitals:    04/21/17 1025   BP: 142/78   BP Location: Left arm   Patient Position: Sitting   Cuff Size: Adult   Pulse: 69   Resp: 16   SpO2: 97%   Weight: 186 lb 11.2 oz (84.7 kg)   Height: 66\" (167.6 cm)       Body mass index is 30.13 kg/(m^2).  Discussed the patient's BMI with her. The BMI is above average; BMI management plan is completed.    Assessment/Plan   Patient Self-Management and Personalized Health Advice  The patient has been provided with information about: diet, exercise, weight management, prevention of cardiac or vascular disease and designing advance directives and preventive services including:   · Advance directive, Bone densitometry screening, Exercise counseling provided, Fall Risk assessment done, Influenza vaccine, Nutrition counseling provided, Pneumococcal vaccine , Zostavax vaccine (Herpes Zoster).    Visit Diagnoses:    ICD-10-CM ICD-9-CM   1. Coronary artery disease of native artery of native heart with stable angina pectoris I25.118 414.01     413.9   2. Essential hypertension I10 401.9   3. Mixed hyperlipidemia E78.2 272.2   4. Gastroesophageal reflux disease without esophagitis K21.9 530.81   5. Type 2 diabetes mellitus with diabetic polyneuropathy, without long-term current use of insulin E11.42 250.60     357.2   6. Anxiety F41.9 300.00   7. Encounter for immunization Z23 V03.89   8. Encounter for screening for lung cancer Z12.2 V76.0   9. Postmenopausal Z78.0 V49.81   10. Personal history of nicotine dependence  Z87.891 V15.82   11. Claudication in peripheral vascular disease I73.9 443.9       Orders Placed This Encounter   Procedures   • CT " Chest Low Dose Wo     Pt quit smoking 5 years ago with >40 pack year history     Standing Status:   Future     Standing Expiration Date:   4/21/2018     Order Specific Question:   Reason for Exam:     Answer:   screening   • DEXA Bone Density Axial     Standing Status:   Future     Standing Expiration Date:   4/21/2018     Order Specific Question:   Reason for Exam:     Answer:   screening   • Pneumococcal Conjugate Vaccine 13-Valent All   • Ambulatory Referral to Optometry     Referral Priority:   Routine     Referral Type:   Consultation     Referral Reason:   Specialty Services Required     Requested Specialty:   Optometry     Number of Visits Requested:   1       Outpatient Encounter Prescriptions as of 4/21/2017   Medication Sig Dispense Refill   • amLODIPine (NORVASC) 10 MG tablet Take 1 tablet by mouth Daily. 90 tablet 1   • aspirin 81 MG EC tablet Take 1 tablet by mouth Daily. 90 tablet 1   • atenolol (TENORMIN) 50 MG tablet Take 1 tablet by mouth Daily. 90 tablet 1   • atorvastatin (LIPITOR) 40 MG tablet Take 1 tablet by mouth Daily. 90 tablet 1   • Blood Glucose Monitoring Suppl (ONE TOUCH ULTRA MINI) W/DEVICE kit Inject 1 strip under the skin 2 (Two) Times a Day. 1 each 11   • clopidogrel (PLAVIX) 75 MG tablet Take 1 tablet by mouth Daily. 90 tablet 1   • hydrochlorothiazide (HYDRODIURIL) 25 MG tablet Take 1 tablet by mouth Daily. 90 tablet 1   • lisinopril (PRINIVIL,ZESTRIL) 20 MG tablet Take 1 tablet by mouth Daily. 90 tablet 3   • melatonin 5 MG tablet tablet Take 1 tablet by mouth At Night As Needed (insomnia). 90 tablet 3   • nitroglycerin (NITROLINGUAL) 0.4 MG/SPRAY spray Place 1 spray under the tongue Every 5 (Five) Minutes As Needed for chest pain. 12 g 2   • ONE TOUCH ULTRA TEST test strip 1 each by Other route 2 (Two) Times a Day. 180 each 1   • pantoprazole (PROTONIX) 40 MG EC tablet Take 1 tablet by mouth Daily. 90 tablet 1   • [DISCONTINUED] lisinopril (PRINIVIL,ZESTRIL) 20 MG tablet Take 1  tablet by mouth Daily. 30 tablet 5   • [DISCONTINUED] metFORMIN (GLUCOPHAGE) 1000 MG tablet Take 1,000 mg by mouth 2 (Two) Times a Day With Meals.     • [DISCONTINUED] venlafaxine (EFFEXOR) 37.5 MG tablet Take 1 tablet by mouth 2 (Two) Times a Day. 180 tablet 1   • tiZANidine (ZANAFLEX) 4 MG tablet Take 1 tablet by mouth As Needed.  2   • venlafaxine XR (EFFEXOR-XR) 75 MG 24 hr capsule Take 1 capsule by mouth Daily. 90 capsule 1     No facility-administered encounter medications on file as of 4/21/2017.        Reviewed use of high risk medication in the elderly: yes  Reviewed for potential of harmful drug interactions in the elderly: yes    Follow Up:  Return in about 4 months (around 8/21/2017) for Recheck.     An After Visit Summary and PPPS with all of these plans were given to the patient.

## 2017-04-21 NOTE — PROGRESS NOTES
CC: follow-up CAD and hypertension    History:  Linda Pickens is a 67 y.o. female who presents today for follow-up for evaluation of the above:  She reports she has been doing well without acute illness. She had an episode of hives, which she believes was from eating new potato chips.  Well and had improvement in his had no further issues since.  She does have complaint of a dry, nonproductive cough that is been present for 5 years since she quit smoking.  It has not worsened with the addition of lisinopril, but it is quite bothersome to her.  She does complain of worsening claudication in her lower extremities bilaterally.  She states she was diagnosed with peripheral arterial disease back when she had her heart problems, though this has gotten worse and is worse with exercise.  She reports her anxiety is uncontrolled and she continues taking venlafaxine, though only at 37.5 mg daily of the immediate release formulation.  She has had no side effects with this, though she has taken Paxil and Zoloft in the past as well.     ROS:  Review of Systems   Constitutional: Negative for chills and fever.   HENT: Negative for congestion and sore throat.    Respiratory: Positive for cough. Negative for shortness of breath.    Cardiovascular: Negative for chest pain and palpitations.   Gastrointestinal: Negative for abdominal pain, constipation and nausea.   Musculoskeletal: Positive for gait problem and myalgias. Negative for back pain.   Psychiatric/Behavioral: Positive for dysphoric mood. The patient is nervous/anxious.        Ms. Pickens  reports that she quit smoking about 5 years ago. Her smoking use included Cigarettes. She does not have any smokeless tobacco history on file. She reports that she does not drink alcohol or use illicit drugs.      Current Outpatient Prescriptions:   •  amLODIPine (NORVASC) 10 MG tablet, Take 1 tablet by mouth Daily., Disp: 90 tablet, Rfl: 1  •  aspirin 81 MG EC tablet, Take 1 tablet by mouth  "Daily., Disp: 90 tablet, Rfl: 1  •  atenolol (TENORMIN) 50 MG tablet, Take 1 tablet by mouth Daily., Disp: 90 tablet, Rfl: 1  •  atorvastatin (LIPITOR) 40 MG tablet, Take 1 tablet by mouth Daily., Disp: 90 tablet, Rfl: 1  •  Blood Glucose Monitoring Suppl (ONE TOUCH ULTRA MINI) W/DEVICE kit, Inject 1 strip under the skin 2 (Two) Times a Day., Disp: 1 each, Rfl: 11  •  clopidogrel (PLAVIX) 75 MG tablet, Take 1 tablet by mouth Daily., Disp: 90 tablet, Rfl: 1  •  hydrochlorothiazide (HYDRODIURIL) 25 MG tablet, Take 1 tablet by mouth Daily., Disp: 90 tablet, Rfl: 1  •  lisinopril (PRINIVIL,ZESTRIL) 20 MG tablet, Take 1 tablet by mouth Daily., Disp: 30 tablet, Rfl: 5  •  melatonin 5 MG tablet tablet, Take 1 tablet by mouth At Night As Needed (insomnia)., Disp: 90 tablet, Rfl: 3  •  nitroglycerin (NITROLINGUAL) 0.4 MG/SPRAY spray, Place 1 spray under the tongue Every 5 (Five) Minutes As Needed for chest pain., Disp: 12 g, Rfl: 2  •  ONE TOUCH ULTRA TEST test strip, 1 each by Other route 2 (Two) Times a Day., Disp: 180 each, Rfl: 1  •  pantoprazole (PROTONIX) 40 MG EC tablet, Take 1 tablet by mouth Daily., Disp: 90 tablet, Rfl: 1  •  venlafaxine (EFFEXOR) 37.5 MG tablet, Take 1 tablet by mouth 2 (Two) Times a Day., Disp: 180 tablet, Rfl: 1  •  tiZANidine (ZANAFLEX) 4 MG tablet, Take 1 tablet by mouth As Needed., Disp: , Rfl: 2      OBJECTIVE:  /78 (BP Location: Left arm, Patient Position: Sitting, Cuff Size: Adult)  Pulse 69  Resp 16  Ht 66\" (167.6 cm)  Wt 186 lb 11.2 oz (84.7 kg)  SpO2 97%  BMI 30.13 kg/m2   Physical Exam   Constitutional: She is oriented to person, place, and time. She appears well-nourished. No distress.   Cardiovascular: Normal rate, regular rhythm and normal heart sounds.    No murmur heard.  Pulmonary/Chest: Effort normal and breath sounds normal. She has no wheezes.   Abdominal: Soft. There is no tenderness.    Linda had a diabetic foot exam performed today.   During the foot exam she " had a monofilament test performed.    Neurological Sensory Findings -  No right ankle/foot altered proprioception and no left ankle/foot altered proprioception    Vascular Status -  Her exam exhibits right foot vasculature normal. Her exam exhibits no right foot edema. Her exam exhibits left foot vasculature normal. Her exam exhibits no left foot edema.   Skin Integrity  -  Her right foot skin is intact.     Linda 's left foot skin is intact. .  Neurological: She is alert and oriented to person, place, and time.   Psychiatric: She has a normal mood and affect.       Assessment/Plan    Diagnoses and all orders for this visit:    Coronary artery disease of native artery of native heart with stable angina pectoris  Stable on aspirin, Plavix, beta blocker, and statin therapy.  She has no anginal symptoms at this time, though she did take nitroglycerin about 2 weeks ago with appropriate resolution of symptoms.    Essential hypertension  -     lisinopril (PRINIVIL,ZESTRIL) 20 MG tablet; Take 1 tablet by mouth Daily.  Well controlled, BP goal for age is <140/90 per JNC 8 guidelines and continue current medications    Mixed hyperlipidemia  Stable on high intensity therapy per ACC/AHA guidelines.    Gastroesophageal reflux disease without esophagitis  Stable on PPI.    Type 2 diabetes mellitus with diabetic polyneuropathy, without long-term current use of insulin  -     Ambulatory Referral to Optometry  Last A1c was 6.7% with diet control in February 2017.  She is on an ACE inhibitor and appropriate intensity statin.  Referral for diabetic eye exam and foot exam performed today.  She may continue with diet control.    Anxiety  -     venlafaxine XR (EFFEXOR-XR) 75 MG 24 hr capsule; Take 1 capsule by mouth Daily.  We will increase her total dose of venlafaxine to 75 mg and also provide this in a long-acting formulation for once daily dosing.    Encounter for immunization  -     Pneumococcal Conjugate Vaccine 13-Valent  All    Encounter for screening for lung cancer  -     CT Chest Low Dose Wo; Future    Postmenopausal  -     DEXA Bone Density Axial; Future    Personal history of nicotine dependence   -     CT Chest Low Dose Wo; Future    Claudication in peripheral vascular disease  -     Doppler Ankle Brachial Index Single Level CAR; Future    Obesity  Recommended careful attention to portion control and being careful about the types and timing of her meals for the purpose of weight and glycemic management.  She is also encouraged to get regular exercise.    An After Visit Summary was printed and given to the patient at discharge.  Return in about 4 months (around 8/21/2017) for Recheck. Sooner if problems arise.         Johny Lehman D.O. 4/21/2017

## 2017-04-21 NOTE — PATIENT INSTRUCTIONS
Medicare Wellness  Personal Prevention Plan of Service     Date of Office Visit:  2017  Encounter Provider:  Johny Lehman DO  Place of Service:  [facility]  Patient Name: Linda Pickens  :  1950    As part of the Medicare Wellness portion of your visit today, we are providing you with this personalized preventive plan of services (PPPS). This plan is based upon recommendations of the [place] Preventive Services Task Force (USPSTF) and the Advisory Committee on Immunization Practices (ACIP).    This lists the preventive care services that should be considered, and provides dates of when you are due. Items listed as completed are up-to-date and do not require any further intervention.    Health Maintenance   Topic Date Due   • TDAP/TD VACCINES (1 - Tdap) 1971   • PAP SMEAR  10/17/2016   • ZOSTER VACCINE  10/17/2016   • DIABETIC FOOT EXAM  10/20/2016   • DIABETIC EYE EXAM  10/20/2016   • COLONOSCOPY  10/20/2016   • PNEUMOCOCCAL VACCINES (65+ LOW/MEDIUM RISK) (1 of 2 - PCV13) 2017   • HEMOGLOBIN A1C  2017   • URINE MICROALBUMIN  2017   • LIPID PANEL  2018   • MAMMOGRAM  2018   • MEDICARE ANNUAL WELLNESS  2018   • HEPATITIS C SCREENING  Completed   • INFLUENZA VACCINE  Completed       Orders Placed This Encounter   Procedures   • CT Chest Low Dose Wo     Pt quit smoking 5 years ago with >40 pack year history     Standing Status:   Future     Standing Expiration Date:   2018     Order Specific Question:   Reason for Exam:     Answer:   screening   • DEXA Bone Density Axial     Standing Status:   Future     Standing Expiration Date:   2018     Order Specific Question:   Reason for Exam:     Answer:   screening   • Pneumococcal Conjugate Vaccine 13-Valent All   • Ambulatory Referral to Optometry     Referral Priority:   Routine     Referral Type:   Consultation     Referral Reason:   Specialty  Services Required     Requested Specialty:   Optometry     Number of Visits Requested:   1       Return in about 4 months (around 8/21/2017) for Recheck.

## 2017-04-25 ENCOUNTER — HOSPITAL ENCOUNTER (OUTPATIENT)
Dept: BONE DENSITY | Facility: HOSPITAL | Age: 67
Discharge: HOME | End: 2017-04-25

## 2017-04-25 ENCOUNTER — HOSPITAL ENCOUNTER (OUTPATIENT)
Dept: CT IMAGING | Facility: HOSPITAL | Age: 67
Discharge: HOME | End: 2017-04-25
Admitting: INTERNAL MEDICINE

## 2017-04-25 DIAGNOSIS — Z87.891 PERSONAL HISTORY OF NICOTINE DEPENDENCE: ICD-10-CM

## 2017-04-25 DIAGNOSIS — Z12.2 ENCOUNTER FOR SCREENING FOR LUNG CANCER: ICD-10-CM

## 2017-04-25 DIAGNOSIS — I73.9 CLAUDICATION (HCC): Primary | ICD-10-CM

## 2017-04-25 DIAGNOSIS — Z78.0 POSTMENOPAUSAL: ICD-10-CM

## 2017-04-25 PROCEDURE — G0297 LDCT FOR LUNG CA SCREEN: HCPCS

## 2017-04-25 PROCEDURE — 77080 DXA BONE DENSITY AXIAL: CPT

## 2017-05-02 ENCOUNTER — HOSPITAL ENCOUNTER (OUTPATIENT)
Dept: ULTRASOUND IMAGING | Facility: HOSPITAL | Age: 67
Discharge: HOME | End: 2017-05-02
Admitting: INTERNAL MEDICINE

## 2017-05-02 DIAGNOSIS — I73.9 CLAUDICATION (HCC): ICD-10-CM

## 2017-05-02 PROCEDURE — 93922 UPR/L XTREMITY ART 2 LEVELS: CPT

## 2017-05-02 PROCEDURE — 93924 LWR XTR VASC STDY BILAT: CPT | Performed by: SURGERY

## 2017-05-22 ENCOUNTER — OFFICE VISIT (OUTPATIENT)
Dept: CARDIOLOGY | Facility: CLINIC | Age: 67
End: 2017-05-22

## 2017-05-22 VITALS
SYSTOLIC BLOOD PRESSURE: 142 MMHG | WEIGHT: 186 LBS | DIASTOLIC BLOOD PRESSURE: 70 MMHG | BODY MASS INDEX: 29.89 KG/M2 | HEART RATE: 52 BPM | HEIGHT: 66 IN

## 2017-05-22 DIAGNOSIS — E78.2 MIXED HYPERLIPIDEMIA: ICD-10-CM

## 2017-05-22 DIAGNOSIS — K21.9 GASTROESOPHAGEAL REFLUX DISEASE WITHOUT ESOPHAGITIS: ICD-10-CM

## 2017-05-22 DIAGNOSIS — K27.9 PEPTIC ULCER: ICD-10-CM

## 2017-05-22 DIAGNOSIS — Z95.1 S/P CABG (CORONARY ARTERY BYPASS GRAFT): ICD-10-CM

## 2017-05-22 DIAGNOSIS — I10 ESSENTIAL HYPERTENSION: Primary | ICD-10-CM

## 2017-05-22 DIAGNOSIS — E11.42 TYPE 2 DIABETES MELLITUS WITH DIABETIC POLYNEUROPATHY, WITHOUT LONG-TERM CURRENT USE OF INSULIN (HCC): ICD-10-CM

## 2017-05-22 DIAGNOSIS — I25.118 CORONARY ARTERY DISEASE OF NATIVE ARTERY OF NATIVE HEART WITH STABLE ANGINA PECTORIS (HCC): ICD-10-CM

## 2017-05-22 PROCEDURE — 99214 OFFICE O/P EST MOD 30 MIN: CPT | Performed by: INTERNAL MEDICINE

## 2017-05-22 PROCEDURE — 93000 ELECTROCARDIOGRAM COMPLETE: CPT | Performed by: INTERNAL MEDICINE

## 2017-05-22 RX ORDER — HYDROCODONE BITARTRATE AND ACETAMINOPHEN 7.5; 325 MG/1; MG/1
1 TABLET ORAL EVERY 6 HOURS PRN
COMMUNITY
End: 2017-06-27

## 2017-06-27 ENCOUNTER — OFFICE VISIT (OUTPATIENT)
Dept: INTERNAL MEDICINE | Facility: CLINIC | Age: 67
End: 2017-06-27

## 2017-06-27 VITALS
HEART RATE: 64 BPM | BODY MASS INDEX: 29.32 KG/M2 | DIASTOLIC BLOOD PRESSURE: 68 MMHG | HEIGHT: 66 IN | WEIGHT: 182.4 LBS | RESPIRATION RATE: 16 BRPM | OXYGEN SATURATION: 98 % | SYSTOLIC BLOOD PRESSURE: 130 MMHG

## 2017-06-27 DIAGNOSIS — R10.9 FLANK PAIN: ICD-10-CM

## 2017-06-27 DIAGNOSIS — M25.50 CHRONIC JOINT PAIN: ICD-10-CM

## 2017-06-27 DIAGNOSIS — K21.00 GASTROESOPHAGEAL REFLUX DISEASE WITH ESOPHAGITIS: ICD-10-CM

## 2017-06-27 DIAGNOSIS — I25.708 CORONARY ARTERY DISEASE OF BYPASS GRAFT OF NATIVE HEART WITH STABLE ANGINA PECTORIS (HCC): ICD-10-CM

## 2017-06-27 DIAGNOSIS — D49.7 NEOPLASM OF UNSPECIFIED BEHAVIOR OF ENDOCRINE GLANDS AND OTHER PARTS OF NERVOUS SYSTEM: ICD-10-CM

## 2017-06-27 DIAGNOSIS — E04.9 ENLARGEMENT OF THYROID: ICD-10-CM

## 2017-06-27 DIAGNOSIS — G89.29 CHRONIC JOINT PAIN: ICD-10-CM

## 2017-06-27 DIAGNOSIS — I20.9 ANGINA PECTORIS (HCC): ICD-10-CM

## 2017-06-27 DIAGNOSIS — R09.89 GLOBUS SENSATION: Primary | ICD-10-CM

## 2017-06-27 DIAGNOSIS — I10 ESSENTIAL HYPERTENSION: ICD-10-CM

## 2017-06-27 DIAGNOSIS — E78.2 MIXED HYPERLIPIDEMIA: ICD-10-CM

## 2017-06-27 DIAGNOSIS — K27.9 PEPTIC ULCER: ICD-10-CM

## 2017-06-27 PROBLEM — E66.3 OVERWEIGHT: Status: ACTIVE | Noted: 2017-04-21

## 2017-06-27 PROCEDURE — 99214 OFFICE O/P EST MOD 30 MIN: CPT | Performed by: INTERNAL MEDICINE

## 2017-06-27 RX ORDER — HYDROCHLOROTHIAZIDE 25 MG/1
25 TABLET ORAL DAILY
Qty: 90 TABLET | Refills: 3 | Status: SHIPPED | OUTPATIENT
Start: 2017-06-27 | End: 2017-11-17 | Stop reason: SDUPTHER

## 2017-06-27 RX ORDER — ATENOLOL 50 MG/1
50 TABLET ORAL DAILY
Qty: 90 TABLET | Refills: 3 | Status: SHIPPED | OUTPATIENT
Start: 2017-06-27 | End: 2018-08-02 | Stop reason: SDUPTHER

## 2017-06-27 RX ORDER — ATORVASTATIN CALCIUM 40 MG/1
40 TABLET, FILM COATED ORAL DAILY
Qty: 90 TABLET | Refills: 3 | Status: SHIPPED | OUTPATIENT
Start: 2017-06-27 | End: 2018-08-02 | Stop reason: SDUPTHER

## 2017-06-27 RX ORDER — PANTOPRAZOLE SODIUM 40 MG/1
40 TABLET, DELAYED RELEASE ORAL DAILY
Qty: 90 TABLET | Refills: 3 | Status: SHIPPED | OUTPATIENT
Start: 2017-06-27 | End: 2017-12-13 | Stop reason: SDUPTHER

## 2017-06-27 RX ORDER — HYDROCODONE BITARTRATE AND ACETAMINOPHEN 7.5; 325 MG/1; MG/1
1 TABLET ORAL DAILY PRN
Qty: 10 TABLET | Refills: 0 | Status: SHIPPED | OUTPATIENT
Start: 2017-06-27 | End: 2018-01-30

## 2017-06-27 RX ORDER — CLOPIDOGREL BISULFATE 75 MG/1
75 TABLET ORAL DAILY
Qty: 90 TABLET | Refills: 3 | Status: SHIPPED | OUTPATIENT
Start: 2017-06-27 | End: 2018-08-02 | Stop reason: SDUPTHER

## 2017-06-27 RX ORDER — ASPIRIN 81 MG/1
81 TABLET ORAL DAILY
Qty: 90 TABLET | Refills: 3 | Status: SHIPPED | OUTPATIENT
Start: 2017-06-27

## 2017-06-27 NOTE — PROGRESS NOTES
CC: Dysphagia    History:  Linda Pickens is a 67 y.o. female who presents today for follow-up for evaluation of the above:  She reports she has been doing reasonably well, though she has ongoing cough, intermittent tingling of her mouth and slurred speech.  She has had 2 episodes in the past 2 weeks.  She has a globus sensation about the level of the clavicle with choking and coughing that ultimately results in vomiting of a yellow-green vomitus without any coffee-ground material.  She has no odynophagia, unexplained weight loss, or melena.  She has had some intermittent right flank pain.  Previous CT and MR imaging of her abdomen reveal what appears to be a benign lesion in her right upper pole of the kidney.  She reports the pain waxes and wanes without radiation and is quite severe at times.  She wonders if she could be having kidney stones.  She continues on her medications for street of CAD status post CABG.  She reports she does have occasional chest pains that are appropriately resolved with nitroglycerin.      ROS:  Review of Systems   Constitutional: Negative for chills and fever.   HENT: Positive for trouble swallowing.    Respiratory: Positive for choking. Negative for cough and shortness of breath.    Cardiovascular: Negative for chest pain, palpitations and leg swelling.   Gastrointestinal: Negative for abdominal pain, constipation and nausea.   Genitourinary: Negative for difficulty urinating, dysuria and hematuria.        Right flank pain       Ms. Pickens  reports that she quit smoking about 5 years ago. Her smoking use included Cigarettes. She has never used smokeless tobacco. She reports that she does not drink alcohol or use illicit drugs.      Current Outpatient Prescriptions:   •  aspirin 81 MG EC tablet, Take 1 tablet by mouth Daily., Disp: 90 tablet, Rfl: 1  •  atenolol (TENORMIN) 50 MG tablet, Take 1 tablet by mouth Daily., Disp: 90 tablet, Rfl: 1  •  atorvastatin (LIPITOR) 40 MG tablet, Take 1  "tablet by mouth Daily., Disp: 90 tablet, Rfl: 1  •  Blood Glucose Monitoring Suppl (ONE TOUCH ULTRA MINI) W/DEVICE kit, Inject 1 strip under the skin 2 (Two) Times a Day., Disp: 1 each, Rfl: 11  •  clopidogrel (PLAVIX) 75 MG tablet, Take 1 tablet by mouth Daily., Disp: 90 tablet, Rfl: 1  •  hydrochlorothiazide (HYDRODIURIL) 25 MG tablet, Take 1 tablet by mouth Daily., Disp: 90 tablet, Rfl: 1  •  lisinopril (PRINIVIL,ZESTRIL) 20 MG tablet, Take 1 tablet by mouth Daily., Disp: 90 tablet, Rfl: 3  •  melatonin 5 MG tablet tablet, Take 1 tablet by mouth At Night As Needed (insomnia)., Disp: 90 tablet, Rfl: 3  •  nitroglycerin (NITROLINGUAL) 0.4 MG/SPRAY spray, Place 1 spray under the tongue Every 5 (Five) Minutes As Needed for chest pain., Disp: 12 g, Rfl: 2  •  ONE TOUCH ULTRA TEST test strip, 1 each by Other route 2 (Two) Times a Day., Disp: 180 each, Rfl: 1  •  pantoprazole (PROTONIX) 40 MG EC tablet, Take 1 tablet by mouth Daily., Disp: 90 tablet, Rfl: 1  •  venlafaxine XR (EFFEXOR-XR) 75 MG 24 hr capsule, Take 1 capsule by mouth Daily., Disp: 90 capsule, Rfl: 1      OBJECTIVE:  /68 (BP Location: Left arm, Patient Position: Sitting, Cuff Size: Adult)  Pulse 64  Resp 16  Ht 66\" (167.6 cm)  Wt 182 lb 6.4 oz (82.7 kg)  SpO2 98%  BMI 29.44 kg/m2   Physical Exam   Constitutional: She is oriented to person, place, and time. She appears well-nourished. No distress.   Neck: Thyromegaly (there is left-sided thyromegaly with no discrete noduleappreciated) present.   Cardiovascular: Normal rate, regular rhythm and normal heart sounds.    No murmur heard.  Pulmonary/Chest: Effort normal and breath sounds normal. She has no wheezes.   Abdominal: Soft. There is no tenderness.   Musculoskeletal:   There is mild tenderness to palpation on the right flank.  Mariano's punch is not grossly positive.   Neurological: She is alert and oriented to person, place, and time.   Psychiatric: She has a normal mood and affect. "       Assessment/Plan    Diagnoses and all orders for this visit:    Globus sensation  Enlargement of thyroid  -     TSH; Future  -     US Thyroid  Given thyromegaly on the left, we will obtain an ultrasound of the thyroid and also check TSH.  This was noted to be normal in August 2016.  Endoscopy in January 2017 revealed peptic ulcers and esophagitis, though there was no evidence for any stricture, web, or other obvious cause for dysphasia.    Gastroesophageal reflux disease with esophagitis  Peptic ulcer  -     pantoprazole (PROTONIX) 40 MG EC tablet; Take 1 tablet by mouth Daily.  EGD results as above.  We stressed the importance of daily pantoprazole.    Essential hypertension  -     hydrochlorothiazide (HYDRODIURIL) 25 MG tablet; Take 1 tablet by mouth Daily.  -     clopidogrel (PLAVIX) 75 MG tablet; Take 1 tablet by mouth Daily.  -     atenolol (TENORMIN) 50 MG tablet; Take 1 tablet by mouth Daily.  -     aspirin 81 MG EC tablet; Take 1 tablet by mouth Daily.  Well controlled, BP goal for age is <140/90 per JNC 8 guidelines and continue current medications    Coronary artery disease of bypass graft of native heart with stable angina pectoris  -     clopidogrel (PLAVIX) 75 MG tablet; Take 1 tablet by mouth Daily.  -     aspirin 81 MG EC tablet; Take 1 tablet by mouth Daily.    Mixed hyperlipidemia  -     atorvastatin (LIPITOR) 40 MG tablet; Take 1 tablet by mouth Daily.  Stable on high intensity statin therapy per ACC/AHA guidelines.    Flank pain  -     TSH; Future  -     Urinalysis w/Culture if Indicated; Future  We will obtain urinalysis with culture if indicated.  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  If her pain persists, we could consider CT stone protocol and possible urologic referral if negative.    Chronic joint pain  -     HYDROcodone-acetaminophen (NORCO) 7.5-325 MG per tablet; Take 1 tablet by mouth Daily As Needed for  Moderate Pain (4-6).  #10 for prn use written after review of JAYESH.    An After Visit Summary was printed and given to the patient at discharge.  Return for Next scheduled follow up. Sooner if problems arise.         Johny Lehman D.O. 6/27/2017

## 2017-07-20 DIAGNOSIS — E01.0 THYROMEGALY: ICD-10-CM

## 2017-07-20 DIAGNOSIS — R09.89 GLOBUS SENSATION: Primary | ICD-10-CM

## 2017-07-25 ENCOUNTER — HOSPITAL ENCOUNTER (OUTPATIENT)
Dept: ULTRASOUND IMAGING | Facility: HOSPITAL | Age: 67
Discharge: HOME | End: 2017-07-25
Attending: INTERNAL MEDICINE | Admitting: INTERNAL MEDICINE

## 2017-07-25 DIAGNOSIS — E01.0 THYROMEGALY: ICD-10-CM

## 2017-07-25 DIAGNOSIS — R09.89 GLOBUS SENSATION: ICD-10-CM

## 2017-07-25 PROCEDURE — 76536 US EXAM OF HEAD AND NECK: CPT

## 2017-08-17 ENCOUNTER — OFFICE VISIT (OUTPATIENT)
Dept: INTERNAL MEDICINE | Facility: CLINIC | Age: 67
End: 2017-08-17

## 2017-08-17 ENCOUNTER — LAB (OUTPATIENT)
Dept: LAB | Facility: HOSPITAL | Age: 67
End: 2017-08-17
Attending: INTERNAL MEDICINE

## 2017-08-17 VITALS
OXYGEN SATURATION: 98 % | HEART RATE: 67 BPM | WEIGHT: 182.9 LBS | DIASTOLIC BLOOD PRESSURE: 62 MMHG | HEIGHT: 66 IN | SYSTOLIC BLOOD PRESSURE: 124 MMHG | BODY MASS INDEX: 29.39 KG/M2 | RESPIRATION RATE: 16 BRPM

## 2017-08-17 DIAGNOSIS — I20.9 ANGINA PECTORIS (HCC): ICD-10-CM

## 2017-08-17 DIAGNOSIS — R05.9 COUGH: ICD-10-CM

## 2017-08-17 DIAGNOSIS — R10.9 RIGHT FLANK PAIN: ICD-10-CM

## 2017-08-17 DIAGNOSIS — I25.708 CORONARY ARTERY DISEASE OF BYPASS GRAFT OF NATIVE HEART WITH STABLE ANGINA PECTORIS (HCC): ICD-10-CM

## 2017-08-17 DIAGNOSIS — I10 ESSENTIAL HYPERTENSION: Primary | ICD-10-CM

## 2017-08-17 DIAGNOSIS — E66.3 OVERWEIGHT: ICD-10-CM

## 2017-08-17 DIAGNOSIS — E78.2 MIXED HYPERLIPIDEMIA: ICD-10-CM

## 2017-08-17 DIAGNOSIS — E04.2 MULTINODULAR GOITER: ICD-10-CM

## 2017-08-17 DIAGNOSIS — I10 ESSENTIAL HYPERTENSION: ICD-10-CM

## 2017-08-17 DIAGNOSIS — E11.42 TYPE 2 DIABETES MELLITUS WITH DIABETIC POLYNEUROPATHY, WITHOUT LONG-TERM CURRENT USE OF INSULIN (HCC): ICD-10-CM

## 2017-08-17 DIAGNOSIS — K21.9 GASTROESOPHAGEAL REFLUX DISEASE WITHOUT ESOPHAGITIS: ICD-10-CM

## 2017-08-17 DIAGNOSIS — F41.9 ANXIETY: ICD-10-CM

## 2017-08-17 LAB
ANION GAP SERPL CALCULATED.3IONS-SCNC: 11 MMOL/L (ref 4–13)
BILIRUB UR QL STRIP: NEGATIVE
BUN BLD-MCNC: 13 MG/DL (ref 5–21)
BUN/CREAT SERPL: 18.8 (ref 7–25)
CALCIUM SPEC-SCNC: 9.2 MG/DL (ref 8.4–10.4)
CHLORIDE SERPL-SCNC: 98 MMOL/L (ref 98–110)
CLARITY UR: CLEAR
CO2 SERPL-SCNC: 29 MMOL/L (ref 24–31)
COLOR UR: YELLOW
CREAT BLD-MCNC: 0.69 MG/DL (ref 0.5–1.4)
GFR SERPL CREATININE-BSD FRML MDRD: 85 ML/MIN/1.73
GLUCOSE BLD-MCNC: 248 MG/DL (ref 70–100)
GLUCOSE UR STRIP-MCNC: ABNORMAL MG/DL
HBA1C MFR BLD: 8.5 %
HGB UR QL STRIP.AUTO: NEGATIVE
KETONES UR QL STRIP: NEGATIVE
LEUKOCYTE ESTERASE UR QL STRIP.AUTO: NEGATIVE
NITRITE UR QL STRIP: NEGATIVE
PH UR STRIP.AUTO: 5.5 [PH] (ref 5–8)
POTASSIUM BLD-SCNC: 3.8 MMOL/L (ref 3.5–5.3)
PROT UR QL STRIP: NEGATIVE
SODIUM BLD-SCNC: 138 MMOL/L (ref 135–145)
SP GR UR STRIP: 1.02 (ref 1–1.03)
TSH SERPL DL<=0.05 MIU/L-ACNC: 1.28 MIU/ML (ref 0.47–4.68)
UROBILINOGEN UR QL STRIP: ABNORMAL

## 2017-08-17 PROCEDURE — 99214 OFFICE O/P EST MOD 30 MIN: CPT | Performed by: INTERNAL MEDICINE

## 2017-08-17 PROCEDURE — 83036 HEMOGLOBIN GLYCOSYLATED A1C: CPT | Performed by: INTERNAL MEDICINE

## 2017-08-17 PROCEDURE — 36415 COLL VENOUS BLD VENIPUNCTURE: CPT

## 2017-08-17 PROCEDURE — 80048 BASIC METABOLIC PNL TOTAL CA: CPT | Performed by: INTERNAL MEDICINE

## 2017-08-17 PROCEDURE — 81003 URINALYSIS AUTO W/O SCOPE: CPT | Performed by: INTERNAL MEDICINE

## 2017-08-17 PROCEDURE — 84443 ASSAY THYROID STIM HORMONE: CPT | Performed by: INTERNAL MEDICINE

## 2017-08-17 RX ORDER — LOSARTAN POTASSIUM 50 MG/1
50 TABLET ORAL DAILY
Qty: 90 TABLET | Refills: 1 | Status: SHIPPED | OUTPATIENT
Start: 2017-08-17 | End: 2017-12-26

## 2017-08-17 RX ORDER — TIZANIDINE 4 MG/1
4 TABLET ORAL NIGHTLY PRN
COMMUNITY
End: 2018-03-19

## 2017-08-17 RX ORDER — NITROGLYCERIN 400 UG/1
1 SPRAY ORAL
Qty: 12 G | Refills: 2 | Status: SHIPPED | OUTPATIENT
Start: 2017-08-17 | End: 2017-12-28

## 2017-08-17 NOTE — PROGRESS NOTES
CC: Cough and flank pain    History:  Linda Pickens is a 67 y.o. female who presents today for follow-up for evaluation of the above:  She reports she has been doing well without acute illness.  She reports her blood pressure has been under good control without side effects on her medications.  She has occasional anginal symptoms, but she admits this has been less in the last month.  She does take nitroglycerin, which improves her symptoms.  She does continue to have ongoing flank pain and is concerned about the cyst on her kidney.  She has been having ongoing cough as well, which has not resolved with any over-the-counter measures.  Her hot flashes have gotten worse, but she does not feel she needs to change her medications related to this at this time.    ROS:  Review of Systems   Constitutional: Negative for chills and fever.   HENT: Negative for congestion and sore throat.    Respiratory: Positive for cough. Negative for shortness of breath.    Cardiovascular: Negative for chest pain, palpitations and leg swelling.   Gastrointestinal: Negative for abdominal pain, constipation and nausea.   Genitourinary:        Flank pain       Ms. Pickens  reports that she quit smoking about 5 years ago. Her smoking use included Cigarettes. She has never used smokeless tobacco. She reports that she does not drink alcohol or use illicit drugs.      Current Outpatient Prescriptions:   •  aspirin 81 MG EC tablet, Take 1 tablet by mouth Daily., Disp: 90 tablet, Rfl: 3  •  atenolol (TENORMIN) 50 MG tablet, Take 1 tablet by mouth Daily., Disp: 90 tablet, Rfl: 3  •  atorvastatin (LIPITOR) 40 MG tablet, Take 1 tablet by mouth Daily., Disp: 90 tablet, Rfl: 3  •  Blood Glucose Monitoring Suppl (ONE TOUCH ULTRA MINI) W/DEVICE kit, Inject 1 strip under the skin 2 (Two) Times a Day., Disp: 1 each, Rfl: 11  •  clopidogrel (PLAVIX) 75 MG tablet, Take 1 tablet by mouth Daily., Disp: 90 tablet, Rfl: 3  •  hydrochlorothiazide (HYDRODIURIL) 25 MG  "tablet, Take 1 tablet by mouth Daily., Disp: 90 tablet, Rfl: 3  •  HYDROcodone-acetaminophen (NORCO) 7.5-325 MG per tablet, Take 1 tablet by mouth Daily As Needed for Moderate Pain (4-6)., Disp: 10 tablet, Rfl: 0  •  lisinopril (PRINIVIL,ZESTRIL) 20 MG tablet, Take 1 tablet by mouth Daily., Disp: 90 tablet, Rfl: 3  •  melatonin 5 MG tablet tablet, Take 1 tablet by mouth At Night As Needed (insomnia)., Disp: 90 tablet, Rfl: 3  •  nitroglycerin (NITROLINGUAL) 0.4 MG/SPRAY spray, Place 1 spray under the tongue Every 5 (Five) Minutes As Needed for chest pain., Disp: 12 g, Rfl: 2  •  ONE TOUCH ULTRA TEST test strip, 1 each by Other route 2 (Two) Times a Day., Disp: 180 each, Rfl: 1  •  pantoprazole (PROTONIX) 40 MG EC tablet, Take 1 tablet by mouth Daily., Disp: 90 tablet, Rfl: 3  •  tiZANidine (ZANAFLEX) 4 MG tablet, Take 4 mg by mouth At Night As Needed for Muscle Spasms., Disp: , Rfl:   •  venlafaxine XR (EFFEXOR-XR) 75 MG 24 hr capsule, Take 1 capsule by mouth Daily., Disp: 90 capsule, Rfl: 1      OBJECTIVE:  /62 (BP Location: Left arm, Patient Position: Sitting, Cuff Size: Adult)  Pulse 67  Resp 16  Ht 66\" (167.6 cm)  Wt 182 lb 14.4 oz (83 kg)  SpO2 98%  BMI 29.52 kg/m2   Physical Exam   Constitutional: She is oriented to person, place, and time. She appears well-nourished. No distress.   Cardiovascular: Normal rate, regular rhythm and normal heart sounds.    No murmur heard.  Pulmonary/Chest: Effort normal and breath sounds normal. No respiratory distress. She has no wheezes.   Abdominal: Soft. There is no tenderness.   Neurological: She is alert and oriented to person, place, and time.   Psychiatric: She has a normal mood and affect.       Assessment/Plan    Diagnoses and all orders for this visit:    Essential hypertension  -     losartan (COZAAR) 50 MG tablet; Take 1 tablet by mouth Daily.  -     Basic Metabolic Panel; Future  Her cough predates lisinopril, though we will switch to losartan at this " time to eliminate this as a cause.  BP well controlled.    Coronary artery disease of bypass graft of native heart with stable angina pectoris  -     nitroglycerin (NITROLINGUAL) 0.4 MG/SPRAY spray; Place 1 spray under the tongue Every 5 (Five) Minutes As Needed for Chest Pain.  Stable on aspirin, beta blocker, statin.  Angina controlled with nitroglycerin.    Overweight  Recommended attention to portion control and being careful about the types and timing of meals for the purpose of weight management.    Type 2 diabetes mellitus with diabetic polyneuropathy, without long-term current use of insulin  -     Hemoglobin A1c; Future  A1c is 8.5%. We will start daily metformin at 500mg. On ARB & appropriate intensity statin. Foot and eye exam are up to date and BP is well controlled.    Cough  -     Full Pulmonary Function Test With Bronchodilator; Future  We will obtain PFTs for further evaluation. She does have a history of smoking as well. Low dose CT showed lung RADS 2. Repeat in 4/2018.     Gastroesophageal reflux disease without esophagitis  Continue PPI. No uncontrolled reflux at this time, which could contribute to cough.    Anxiety  Stable on Effexor, though she does have hot flashes.    Mixed hyperlipidemia  Stable on high intensity statin therapy per ACC/AHA guidelines.    Multinodular goiter  -     TSH; Future  TSH WNL. We will plan for f/u ultrasound in 6-12 months.    Right flank pain  -     Urinalysis With / Culture If Indicated; Future  We will check UA for further evaluation.      An After Visit Summary was printed and given to the patient at discharge.  Return in about 4 months (around 12/17/2017) for Recheck. Sooner if problems arise.         Johny Lehman D.O. 8/18/2017

## 2017-08-18 PROBLEM — E04.2 MULTINODULAR GOITER: Status: ACTIVE | Noted: 2017-08-18

## 2017-08-22 ENCOUNTER — HOSPITAL ENCOUNTER (OUTPATIENT)
Dept: PULMONOLOGY | Facility: HOSPITAL | Age: 67
Discharge: HOME | End: 2017-08-22
Attending: INTERNAL MEDICINE | Admitting: INTERNAL MEDICINE

## 2017-08-22 VITALS — BODY MASS INDEX: 28.41 KG/M2 | WEIGHT: 181 LBS | HEIGHT: 67 IN

## 2017-08-22 DIAGNOSIS — R05.9 COUGH: ICD-10-CM

## 2017-08-22 PROCEDURE — 94726 PLETHYSMOGRAPHY LUNG VOLUMES: CPT

## 2017-08-22 PROCEDURE — 94060 EVALUATION OF WHEEZING: CPT

## 2017-08-22 PROCEDURE — 63710000001 ALBUTEROL PER 1 MG: Performed by: INTERNAL MEDICINE

## 2017-08-22 PROCEDURE — 94729 DIFFUSING CAPACITY: CPT

## 2017-08-22 PROCEDURE — A9270 NON-COVERED ITEM OR SERVICE: HCPCS | Performed by: INTERNAL MEDICINE

## 2017-08-22 RX ORDER — ALBUTEROL SULFATE 2.5 MG/3ML
2.5 SOLUTION RESPIRATORY (INHALATION) ONCE
Status: COMPLETED | OUTPATIENT
Start: 2017-08-22 | End: 2017-08-22

## 2017-08-22 RX ADMIN — ALBUTEROL SULFATE 2.5 MG: 2.5 SOLUTION RESPIRATORY (INHALATION) at 09:53

## 2017-08-30 ENCOUNTER — TELEPHONE (OUTPATIENT)
Dept: INTERNAL MEDICINE | Facility: CLINIC | Age: 67
End: 2017-08-30

## 2017-11-15 ENCOUNTER — TELEPHONE (OUTPATIENT)
Dept: INTERNAL MEDICINE | Facility: CLINIC | Age: 67
End: 2017-11-15

## 2017-11-15 NOTE — TELEPHONE ENCOUNTER
Needing refill on hydrochlorothiazide 25 mg.    Refill needs to be sent to [facility] 90 day supply

## 2017-11-17 ENCOUNTER — TELEPHONE (OUTPATIENT)
Dept: INTERNAL MEDICINE | Facility: CLINIC | Age: 67
End: 2017-11-17

## 2017-11-17 ENCOUNTER — HOSPITAL ENCOUNTER (OUTPATIENT)
Dept: GENERAL RADIOLOGY | Facility: HOSPITAL | Age: 67
Discharge: HOME | End: 2017-11-17
Admitting: NURSE PRACTITIONER

## 2017-11-17 ENCOUNTER — OFFICE VISIT (OUTPATIENT)
Dept: INTERNAL MEDICINE | Facility: CLINIC | Age: 67
End: 2017-11-17

## 2017-11-17 ENCOUNTER — LAB (OUTPATIENT)
Dept: LAB | Facility: HOSPITAL | Age: 67
End: 2017-11-17

## 2017-11-17 VITALS
BODY MASS INDEX: 28.75 KG/M2 | WEIGHT: 183.2 LBS | DIASTOLIC BLOOD PRESSURE: 70 MMHG | OXYGEN SATURATION: 92 % | RESPIRATION RATE: 16 BRPM | HEIGHT: 67 IN | TEMPERATURE: 98.4 F | HEART RATE: 61 BPM | SYSTOLIC BLOOD PRESSURE: 160 MMHG

## 2017-11-17 DIAGNOSIS — E04.2 MULTINODULAR GOITER: ICD-10-CM

## 2017-11-17 DIAGNOSIS — E11.42 TYPE 2 DIABETES MELLITUS WITH DIABETIC POLYNEUROPATHY, WITHOUT LONG-TERM CURRENT USE OF INSULIN (HCC): ICD-10-CM

## 2017-11-17 DIAGNOSIS — H61.23 EXCESSIVE CERUMEN IN BOTH EAR CANALS: ICD-10-CM

## 2017-11-17 DIAGNOSIS — Z23 ENCOUNTER FOR IMMUNIZATION: ICD-10-CM

## 2017-11-17 DIAGNOSIS — Z12.4 ENCOUNTER FOR SCREENING FOR CERVICAL CANCER: ICD-10-CM

## 2017-11-17 DIAGNOSIS — R13.10 DYSPHAGIA, UNSPECIFIED TYPE: ICD-10-CM

## 2017-11-17 DIAGNOSIS — F41.9 ANXIETY: ICD-10-CM

## 2017-11-17 DIAGNOSIS — I10 ESSENTIAL HYPERTENSION: Primary | ICD-10-CM

## 2017-11-17 DIAGNOSIS — M54.2 NECK PAIN: ICD-10-CM

## 2017-11-17 DIAGNOSIS — E66.3 OVERWEIGHT: ICD-10-CM

## 2017-11-17 DIAGNOSIS — I25.118 CORONARY ARTERY DISEASE OF NATIVE ARTERY OF NATIVE HEART WITH STABLE ANGINA PECTORIS (HCC): ICD-10-CM

## 2017-11-17 DIAGNOSIS — K21.9 GASTROESOPHAGEAL REFLUX DISEASE WITHOUT ESOPHAGITIS: ICD-10-CM

## 2017-11-17 DIAGNOSIS — E78.2 MIXED HYPERLIPIDEMIA: ICD-10-CM

## 2017-11-17 DIAGNOSIS — R05.3 CHRONIC COUGH: ICD-10-CM

## 2017-11-17 LAB
HBA1C MFR BLD: 8.9 %
TSH SERPL DL<=0.05 MIU/L-ACNC: 2.33 MIU/ML (ref 0.47–4.68)

## 2017-11-17 PROCEDURE — 84443 ASSAY THYROID STIM HORMONE: CPT | Performed by: NURSE PRACTITIONER

## 2017-11-17 PROCEDURE — 83036 HEMOGLOBIN GLYCOSYLATED A1C: CPT | Performed by: NURSE PRACTITIONER

## 2017-11-17 PROCEDURE — 36415 COLL VENOUS BLD VENIPUNCTURE: CPT

## 2017-11-17 PROCEDURE — 72040 X-RAY EXAM NECK SPINE 2-3 VW: CPT

## 2017-11-17 PROCEDURE — 69209 REMOVE IMPACTED EAR WAX UNI: CPT | Performed by: NURSE PRACTITIONER

## 2017-11-17 PROCEDURE — 90662 IIV NO PRSV INCREASED AG IM: CPT | Performed by: NURSE PRACTITIONER

## 2017-11-17 PROCEDURE — 90471 IMMUNIZATION ADMIN: CPT | Performed by: NURSE PRACTITIONER

## 2017-11-17 PROCEDURE — 99214 OFFICE O/P EST MOD 30 MIN: CPT | Performed by: NURSE PRACTITIONER

## 2017-11-17 RX ORDER — VENLAFAXINE 50 MG/1
50 TABLET ORAL 2 TIMES DAILY
Qty: 180 TABLET | Refills: 3 | Status: SHIPPED | OUTPATIENT
Start: 2017-11-17 | End: 2017-12-26 | Stop reason: DRUGHIGH

## 2017-11-17 RX ORDER — HYDROCHLOROTHIAZIDE 25 MG/1
25 TABLET ORAL DAILY
Qty: 10 TABLET | Refills: 0 | Status: SHIPPED | OUTPATIENT
Start: 2017-11-17 | End: 2017-11-28 | Stop reason: SDUPTHER

## 2017-11-17 RX ORDER — HYDROCHLOROTHIAZIDE 25 MG/1
25 TABLET ORAL DAILY
Qty: 90 TABLET | Refills: 3 | Status: SHIPPED | OUTPATIENT
Start: 2017-11-17 | End: 2018-01-30 | Stop reason: ALTCHOICE

## 2017-11-17 NOTE — PROGRESS NOTES
CC: follow up hypertension, chronic cough, neck pain    History:  Linda Pickens is a 67 y.o. female who presents today for evaluation of the above problems.    Right sided Neck pain x 1 year. Wakes her up during the night. No radiation. Was given 10 Norco at her last appointment 4 months ago and these have lasted her until now.  Also has  tizanadine that she occasionally takes for this.  Both of these are helpful. She complains of daily dysphagia and feeling like she is choking, accompanied by her still present chronic cough.  States that she takes protonix, but does not take it daily as prescribed.  Complains of itching and tenderness in both ears bilaterally.  Also states that she has had 4 episodes over the last 4 months of tongue numbess and slurred speech accompanied by blood shot eyes. Has checked blood pressure immediately following event and states that it was normal.  She states that these events last only a few minutes and when they are over she feels fine, as if nothing happened.  States that does have a blood pressure cuff at home, but does not routinely check her blood pressure, but has experienced white coat hypertension in the past.  In regards to her anxiety, she states that she does not feel that the once a day regimen works as well as the twice a day that she did in the past.  Feels that her symptoms are not as well controlled as she would like at this point.  She does admit to some additional life stress at this time. She states that she does have occasional chest pain, but that this is unchanged from her previous visit. She is unsure when her last PAP was.    ROS:  Review of Systems   Constitutional: Negative for chills, fatigue, fever and unexpected weight change.   HENT: Positive for postnasal drip. Negative for congestion, rhinorrhea, sinus pain, sinus pressure, sneezing and sore throat.    Eyes: Negative for visual disturbance.   Respiratory: Positive for cough, chest tightness and shortness  of breath. Negative for wheezing.    Cardiovascular: Positive for chest pain. Negative for leg swelling.   Gastrointestinal: Negative for abdominal distention, abdominal pain, blood in stool, constipation, diarrhea, nausea and vomiting.   Endocrine: Negative for polyuria.   Genitourinary: Negative for decreased urine volume, dysuria, hematuria and urgency.   Musculoskeletal: Negative for arthralgias and myalgias. Neck pain: right sided.   Skin: Negative for rash.   Allergic/Immunologic: Negative for environmental allergies.   Neurological: Negative for dizziness, seizures, light-headedness and headaches.   Psychiatric/Behavioral: Negative for dysphoric mood and sleep disturbance.       Allergies   Allergen Reactions   • Demerol [Meperidine] GI Intolerance   • Chantix [Varenicline] Hallucinations   • Meperidine And Related GI Intolerance   • Latex Rash     Past Medical History:   Diagnosis Date   • Anxiety    • Arthritis    • CAD (coronary artery disease)    • CHF (congestive heart failure)    • Depression    • Diabetes mellitus    • HTN (hypertension)    • PONV (postoperative nausea and vomiting)    • Vitamin D deficiency      Past Surgical History:   Procedure Laterality Date   • APPENDECTOMY     • CARDIAC CATHETERIZATION      [place]   • CORONARY ARTERY BYPASS GRAFT  1992    x 2   • CORONARY STENT PLACEMENT  2014    X 5   • ENDOSCOPY N/A 1/4/2017    Procedure: ESOPHAGOGASTRODUODENOSCOPY WITH ANESTHESIA;  Surgeon: Maico Shelton DO;  Location: [facility] ENDOSCOPY;  Service:    • HERNIA REPAIR     • SHOULDER SURGERY     • TONSILLECTOMY       Family History   Problem Relation Age of Onset   • Breast cancer Mother    • Lung cancer Mother    • No Known Problems Father    • Arrhythmia Maternal Grandmother    • Heart disease Paternal Grandmother    • Diabetes Paternal Grandmother    • Heart disease Paternal Grandfather    • Diabetes Paternal Grandfather    • Lung cancer Maternal Grandfather    • Colon cancer Neg Hx     • Colon polyps Neg Hx    • Liver cancer Neg Hx    • Liver disease Neg Hx       reports that she quit smoking about 5 years ago. Her smoking use included Cigarettes. She has never used smokeless tobacco. She reports that she does not drink alcohol or use illicit drugs.      Current Outpatient Prescriptions:   •  aspirin 81 MG EC tablet, Take 1 tablet by mouth Daily., Disp: 90 tablet, Rfl: 3  •  atenolol (TENORMIN) 50 MG tablet, Take 1 tablet by mouth Daily., Disp: 90 tablet, Rfl: 3  •  atorvastatin (LIPITOR) 40 MG tablet, Take 1 tablet by mouth Daily., Disp: 90 tablet, Rfl: 3  •  Blood Glucose Monitoring Suppl (ONE TOUCH ULTRA MINI) W/DEVICE kit, Inject 1 strip under the skin 2 (Two) Times a Day., Disp: 1 each, Rfl: 11  •  clopidogrel (PLAVIX) 75 MG tablet, Take 1 tablet by mouth Daily., Disp: 90 tablet, Rfl: 3  •  HYDROcodone-acetaminophen (NORCO) 7.5-325 MG per tablet, Take 1 tablet by mouth Daily As Needed for Moderate Pain (4-6)., Disp: 10 tablet, Rfl: 0  •  losartan (COZAAR) 50 MG tablet, Take 1 tablet by mouth Daily., Disp: 90 tablet, Rfl: 1  •  melatonin 5 MG tablet tablet, Take 1 tablet by mouth At Night As Needed (insomnia)., Disp: 90 tablet, Rfl: 3  •  nitroglycerin (NITROLINGUAL) 0.4 MG/SPRAY spray, Place 1 spray under the tongue Every 5 (Five) Minutes As Needed for Chest Pain., Disp: 12 g, Rfl: 2  •  ONE TOUCH ULTRA TEST test strip, 1 each by Other route 2 (Two) Times a Day., Disp: 180 each, Rfl: 1  •  pantoprazole (PROTONIX) 40 MG EC tablet, Take 1 tablet by mouth Daily., Disp: 90 tablet, Rfl: 3  •  tiZANidine (ZANAFLEX) 4 MG tablet, Take 4 mg by mouth At Night As Needed for Muscle Spasms., Disp: , Rfl:   •  venlafaxine XR (EFFEXOR-XR) 75 MG 24 hr capsule, Take 1 capsule by mouth Daily., Disp: 90 capsule, Rfl: 1  •  hydrochlorothiazide (HYDRODIURIL) 25 MG tablet, Take 1 tablet by mouth Daily., Disp: 90 tablet, Rfl: 3    OBJECTIVE:  /70 (BP Location: Right arm, Patient Position: Sitting, Cuff  "Size: Adult)  Pulse 61  Temp 98.4 °F (36.9 °C) (Oral)   Resp 16  Ht 67\" (170.2 cm)  Wt 183 lb 3.2 oz (83.1 kg)  SpO2 92%  BMI 28.69 kg/m2   Physical Exam   Constitutional: She is oriented to person, place, and time. Vital signs are normal. She appears well-developed and well-nourished. She is cooperative. She does not have a sickly appearance.   HENT:   Head: Normocephalic.   Right Ear: Tympanic membrane, external ear and ear canal normal.   Left Ear: Tympanic membrane, external ear and ear canal normal.   Nose: Right sinus exhibits no maxillary sinus tenderness and no frontal sinus tenderness. Left sinus exhibits no maxillary sinus tenderness and no frontal sinus tenderness.   Mouth/Throat: Uvula is midline, oropharynx is clear and moist and mucous membranes are normal. No posterior oropharyngeal edema.   Eyes: Conjunctivae and lids are normal. Right eye exhibits no discharge. Left eye exhibits no discharge.   Cardiovascular: Normal rate, regular rhythm and normal heart sounds.  Exam reveals no gallop and no friction rub.    No murmur heard.  Pulmonary/Chest: Effort normal and breath sounds normal. No tachypnea. No respiratory distress. She has no wheezes. She has no rales. She exhibits no tenderness.   Abdominal: Soft. Bowel sounds are normal. She exhibits no distension. There is no tenderness.   Neurological: She is alert and oriented to person, place, and time.   Skin: Skin is warm, dry and intact. No rash noted. She is not diaphoretic. No erythema.   Psychiatric: She has a normal mood and affect. Her speech is normal and behavior is normal. Thought content normal.   Vitals reviewed.      Assessment/Plan    Diagnoses and all orders for this visit:    Essential hypertension  -     hydrochlorothiazide (HYDRODIURIL) 25 MG tablet; Take 1 tablet by mouth Daily.  Continue current meds and doses. Monitor BP daily for two weeks at home.  Call our office if not <140/90.  Instructed her to go to ER if she " experienced any more episodes of tongue swelling and slurred speech.    Coronary artery disease of native artery of native heart with stable angina pectoris  No changes at this time. Stable on current therapy.    Overweight  Discussed need for healthy diet and weight loss.    Type 2 diabetes mellitus with diabetic polyneuropathy, without long-term current use of insulin  -     Hemoglobin A1c; Future  Will check A1C today and adjust treatment as necessary.    Gastroesophageal reflux disease without esophagitis  Dysphagia, unspecified type  Chronic cough  -     Ambulatory Referral to Gastroenterology  Instructed her to take her protonix daily as ordered.  Will refer to GI for endoscopy to evaluate complaints of daily dysphagia and choking.    Anxiety  -     venlafaxine (EFFEXOR) 50 MG tablet; Take 1 tablet by mouth 2 (Two) Times a Day.  Since she feels that her symptoms are not controlled as well on once daily treatment and had better control on a BID regimen, we will go back to BID and increase the dosage to 50 mg. instead of the 37.5 that she had previously taken.    Multinodular goiter  -     TSH; Future  Will monitor TSH today and plan to order ultrasound at next visit.    Mixed hyperlipidemia  Continue therapy with atorvastatin 40 mg.    Neck pain  -     XR Spine Cervical 2 or 3 View; Future  Since this pain has been ongoing for one year will order xray today.  She request a refill on Norco.  Will request this from Dr. Lehman as I am unable to prescribe controlled substances.    Encounter for cervical cancer screening  Patient denies a referral at this time and requests that this be postponed until her next appointment so she can get this and her mammogram taken care of at the same time.    Excessive cerumen in both ear canals   Both ears have excessive wax causing itching and tenderness. Bilateral ear lavage with spray nozzle today.  Large amount of hardened cerumen removed bilaterally.  Both canals clear after  lavage.      Encounter for immunization  -     Flu Vaccine High Dose PF 65YR+ ([id number])        An After Visit Summary was printed and given to the patient at discharge.  Return in about 4 months (around 3/17/2018).         Ashley Drake, LATASHA . 11/17/2017

## 2017-11-17 NOTE — TELEPHONE ENCOUNTER
Please let her know that her hemoglobin a1c that evaluates her blood sugar was up from 8.5 to 8.9.  I have sent her metformin in to her mail order pharmacy. She needs to start taking this again, twice a day.

## 2017-11-17 NOTE — TELEPHONE ENCOUNTER
Please let her know that he can't fill the Norco since he hasn't personally seen her in the last 3 months and that I suggested using her muscle relaxer and  tylenol together instead of this.      ----- Message from Johny Lehman DO sent at 11/17/2017  1:46 PM CST -----  I cannot write further without seeing her. Must have a visit with me in the last 3 months to refill. I think the XR and Zanaflex are helpful and I would recommend NSAID or tylenol for pain relief.   ----- Message -----     From: LATASHA Hart     Sent: 11/17/2017  11:27 AM       To: Johny Lehman, DO    You gave her 10 norco for neck pain at her last visit in August. This has lasted her until now.  I ordered an xray and she has Tizanadine as well.  But can you refill her Norco?  Mak is good.

## 2017-11-28 ENCOUNTER — OFFICE VISIT (OUTPATIENT)
Dept: CARDIOLOGY | Facility: CLINIC | Age: 67
End: 2017-11-28

## 2017-11-28 VITALS
HEART RATE: 59 BPM | HEIGHT: 66 IN | WEIGHT: 184 LBS | OXYGEN SATURATION: 100 % | DIASTOLIC BLOOD PRESSURE: 68 MMHG | BODY MASS INDEX: 29.57 KG/M2 | SYSTOLIC BLOOD PRESSURE: 140 MMHG

## 2017-11-28 DIAGNOSIS — R06.09 DOE (DYSPNEA ON EXERTION): ICD-10-CM

## 2017-11-28 DIAGNOSIS — K21.9 GASTROESOPHAGEAL REFLUX DISEASE WITHOUT ESOPHAGITIS: ICD-10-CM

## 2017-11-28 DIAGNOSIS — I10 ESSENTIAL HYPERTENSION: Primary | ICD-10-CM

## 2017-11-28 DIAGNOSIS — F41.9 ANXIETY: ICD-10-CM

## 2017-11-28 DIAGNOSIS — E11.42 TYPE 2 DIABETES MELLITUS WITH DIABETIC POLYNEUROPATHY, WITHOUT LONG-TERM CURRENT USE OF INSULIN (HCC): ICD-10-CM

## 2017-11-28 DIAGNOSIS — I25.118 CORONARY ARTERY DISEASE OF NATIVE ARTERY OF NATIVE HEART WITH STABLE ANGINA PECTORIS (HCC): ICD-10-CM

## 2017-11-28 DIAGNOSIS — Z95.1 S/P CABG (CORONARY ARTERY BYPASS GRAFT): ICD-10-CM

## 2017-11-28 DIAGNOSIS — E78.2 MIXED HYPERLIPIDEMIA: ICD-10-CM

## 2017-11-28 DIAGNOSIS — Z86.73 HX-TIA (TRANSIENT ISCHEMIC ATTACK): ICD-10-CM

## 2017-11-28 PROCEDURE — 99214 OFFICE O/P EST MOD 30 MIN: CPT | Performed by: INTERNAL MEDICINE

## 2017-11-28 PROCEDURE — 93000 ELECTROCARDIOGRAM COMPLETE: CPT | Performed by: INTERNAL MEDICINE

## 2017-11-28 RX ORDER — LISINOPRIL 20 MG/1
20 TABLET ORAL DAILY
COMMUNITY
End: 2018-01-30 | Stop reason: ALTCHOICE

## 2017-11-28 NOTE — PROGRESS NOTES
Linda Pickens  [id number]  1950  67 y.o.  female    Referring Provider: Johny Lehman DO    Reason for Follow-up Visit:  Routine follow up.  coronary artery disease   Subjective    Overall feeling well   No chest pain  Moderate exertional shortness of breath on exertion relieved with rest  No significant cough or wheezing  Going on for several months  No palpitations  Occasional chest pain that is non exertional  No significant pedal edema  No fever or chills  No significant expectoration  No hemoptysis  No presyncope or syncope   No palpitations  No significant pedal edema  Compliant with medications and dietary advice  Good effort tolerance  No presyncope or syncope  Compliant with medications  Transient numbness of lips   Effort tolerance more limited by orthopedic rather than cardiac related issues   No change in symptoms since stress echo 3/16    History of present illness:  Linda Pickens is a 67 y.o. yo female with history of coronary artery disease  who presents today for   Chief Complaint   Patient presents with   • Coronary Artery Disease     6 MON FU    .    History  Past Medical History:   Diagnosis Date   • Anxiety    • Arthritis    • CAD (coronary artery disease)    • CHF (congestive heart failure)    • Depression    • Diabetes mellitus    • HTN (hypertension)    • PONV (postoperative nausea and vomiting)    • Vitamin D deficiency    ,   Past Surgical History:   Procedure Laterality Date   • APPENDECTOMY     • CARDIAC CATHETERIZATION      [place]   • CORONARY ARTERY BYPASS GRAFT  1992    x 2   • CORONARY STENT PLACEMENT  2014    X 5   • ENDOSCOPY N/A 1/4/2017    Procedure: ESOPHAGOGASTRODUODENOSCOPY WITH ANESTHESIA;  Surgeon: Maico Shelton DO;  Location: [facility] ENDOSCOPY;  Service:    • HERNIA REPAIR     • SHOULDER SURGERY     • TONSILLECTOMY     ,   Family History   Problem Relation Age of Onset   • Breast cancer Mother    • Lung cancer Mother    • No Known Problems Father    •  Arrhythmia Maternal Grandmother    • Heart disease Paternal Grandmother    • Diabetes Paternal Grandmother    • Heart disease Paternal Grandfather    • Diabetes Paternal Grandfather    • Lung cancer Maternal Grandfather    • Colon cancer Neg Hx    • Colon polyps Neg Hx    • Liver cancer Neg Hx    • Liver disease Neg Hx    ,   Social History   Substance Use Topics   • Smoking status: Former Smoker     Years: 35.00     Types: Cigarettes     Quit date: 1/1/2012   • Smokeless tobacco: Never Used   • Alcohol use No   ,     Medications  Current Outpatient Prescriptions   Medication Sig Dispense Refill   • aspirin 81 MG EC tablet Take 1 tablet by mouth Daily. 90 tablet 3   • atenolol (TENORMIN) 50 MG tablet Take 1 tablet by mouth Daily. 90 tablet 3   • atorvastatin (LIPITOR) 40 MG tablet Take 1 tablet by mouth Daily. 90 tablet 3   • Blood Glucose Monitoring Suppl (ONE TOUCH ULTRA MINI) W/DEVICE kit Inject 1 strip under the skin 2 (Two) Times a Day. 1 each 11   • clopidogrel (PLAVIX) 75 MG tablet Take 1 tablet by mouth Daily. 90 tablet 3   • hydrochlorothiazide (HYDRODIURIL) 25 MG tablet Take 1 tablet by mouth Daily. 90 tablet 3   • lisinopril (PRINIVIL,ZESTRIL) 20 MG tablet Take 20 mg by mouth Daily.     • melatonin 5 MG tablet tablet Take 1 tablet by mouth At Night As Needed (insomnia). 90 tablet 3   • nitroglycerin (NITROLINGUAL) 0.4 MG/SPRAY spray Place 1 spray under the tongue Every 5 (Five) Minutes As Needed for Chest Pain. 12 g 2   • ONE TOUCH ULTRA TEST test strip 1 each by Other route 2 (Two) Times a Day. 180 each 1   • pantoprazole (PROTONIX) 40 MG EC tablet Take 1 tablet by mouth Daily. 90 tablet 3   • tiZANidine (ZANAFLEX) 4 MG tablet Take 4 mg by mouth At Night As Needed for Muscle Spasms.     • venlafaxine (EFFEXOR) 50 MG tablet Take 1 tablet by mouth 2 (Two) Times a Day. 180 tablet 3   • HYDROcodone-acetaminophen (NORCO) 7.5-325 MG per tablet Take 1 tablet by mouth Daily As Needed for Moderate Pain (4-6). 10  "tablet 0   • losartan (COZAAR) 50 MG tablet Take 1 tablet by mouth Daily. 90 tablet 1   • metFORMIN (GLUCOPHAGE) 500 MG tablet Take 1 tablet by mouth 2 (Two) Times a Day With Meals. 180 tablet 2     No current facility-administered medications for this visit.        Allergies:  Demerol [meperidine]; Chantix [varenicline]; Meperidine and related; and Latex    Review of Systems  Review of Systems   Constitution: Negative.   HENT: Negative.    Eyes: Negative.    Cardiovascular: Positive for dyspnea on exertion. Negative for chest pain, claudication, cyanosis, irregular heartbeat, leg swelling, near-syncope, orthopnea, palpitations, paroxysmal nocturnal dyspnea and syncope.   Respiratory: Negative.    Endocrine: Negative.    Hematologic/Lymphatic: Negative.    Skin: Negative.    Gastrointestinal: Negative for anorexia.   Genitourinary: Negative.    Neurological: Positive for numbness and paresthesias.   Psychiatric/Behavioral: Negative.        Objective     Physical Exam:  /68  Pulse 59  Ht 66\" (167.6 cm)  Wt 184 lb (83.5 kg)  SpO2 100%  BMI 29.7 kg/m2  Physical Exam   Constitutional: She appears well-developed.   HENT:   Head: Normocephalic.   Neck: Normal carotid pulses and no JVD present. No tracheal tenderness present. Carotid bruit is not present. No tracheal deviation and no edema present.   Cardiovascular: Regular rhythm, normal heart sounds and normal pulses.    Pulmonary/Chest: Effort normal. No stridor.   Abdominal: Soft.   Neurological: She is alert. She has normal strength. No cranial nerve deficit or sensory deficit.   Skin: Skin is warm.   Psychiatric: She has a normal mood and affect. Her speech is normal and behavior is normal.       Results Review:       ECG 12 Lead  Date/Time: 11/28/2017 10:03 AM  Performed by: INDY PERRY  Authorized by: INDY PERRY   Comparison: compared with previous ECG from 5/22/2017  Similar to previous ECG  Rhythm: sinus bradycardia  Rate: bradycardic  Conduction: " conduction normal  ST Segments: ST segments normal  T Waves: T waves normal  QRS axis: normal  Clinical impression: abnormal ECG         None    Assessment/Plan   Patient Active Problem List   Diagnosis   • Gastroesophageal reflux disease without esophagitis   • Type 2 diabetes mellitus with diabetic polyneuropathy, without long-term current use of insulin   • Essential hypertension   • Mixed hyperlipidemia   • Peptic ulcer   • CAD (coronary artery disease)   • Anxiety   • Overweight   • Encounter for screening for lung cancer   • S/P CABG (coronary artery bypass graft)   • Multinodular goiter   • RAIN (dyspnea on exertion)   • Hx-TIA (transient ischemic attack)       Stress echo and echo OK in 3/16    Plan:    Close follow up with you as scheduled.  Intensive factor modifications.  See order list.    Counseled regarding disease appropriate diet, fluid, caffeine, stimulants and sodium intake as well as importance of compliance to diet, exercise and regular follow up.  Avoid NSAIDS and COX2 inhibitors. Use Acetaminophen PRN.  Monitor for any signs of bleeding including red or dark stools. Fall precautions.   Patient is asked to monitor BP at home or work, several times per month and return with written values at next office visit.  Monitor CBC, CMP and Lipid Panel by primary  Keep LDL below 70 mg/dl. Monitor liver and renal functions.   The patient is asked to make an attempt to improve diet and exercise patterns to aid in medical management of this problem.   Relevant printed educational materials given pertinent to above diagnoses    She wants to defer stress testing for now  S/L NTG PRN for chest pain, call me or go to ER if has to use S/L nitroglycerin   Refer to neurology for possible transient ischemic attacks   Orders Placed This Encounter   Procedures   • US Carotid Bilateral     Standing Status:   Future     Standing Expiration Date:   11/28/2018     Order Specific Question:   Reason for Exam:     Answer:    tia   • Ambulatory Referral to Neurology     Referral Priority:   Routine     Referral Type:   Consultation     Referral Reason:   Specialty Services Required     Requested Specialty:   Neurology     Number of Visits Requested:   1   • ECG 12 Lead     This order was created via procedure documentation     Gave a copy of my notes for the patient to review and follow specific advise and relevant findings if any, prognosis, along with my current and futrure plan         Return in about 6 months (around 5/28/2018).

## 2017-12-04 ENCOUNTER — HOSPITAL ENCOUNTER (OUTPATIENT)
Dept: ULTRASOUND IMAGING | Facility: HOSPITAL | Age: 67
Discharge: HOME | End: 2017-12-04
Attending: INTERNAL MEDICINE | Admitting: INTERNAL MEDICINE

## 2017-12-04 DIAGNOSIS — Z86.73 HX-TIA (TRANSIENT ISCHEMIC ATTACK): ICD-10-CM

## 2017-12-04 PROCEDURE — 93880 EXTRACRANIAL BILAT STUDY: CPT | Performed by: SURGERY

## 2017-12-04 PROCEDURE — 93880 EXTRACRANIAL BILAT STUDY: CPT

## 2017-12-13 ENCOUNTER — OFFICE VISIT (OUTPATIENT)
Dept: GASTROENTEROLOGY | Facility: CLINIC | Age: 67
End: 2017-12-13

## 2017-12-13 VITALS
DIASTOLIC BLOOD PRESSURE: 62 MMHG | WEIGHT: 185 LBS | TEMPERATURE: 96.4 F | OXYGEN SATURATION: 100 % | HEART RATE: 58 BPM | BODY MASS INDEX: 29.73 KG/M2 | HEIGHT: 66 IN | SYSTOLIC BLOOD PRESSURE: 136 MMHG

## 2017-12-13 DIAGNOSIS — Z79.01 ANTICOAGULATED: ICD-10-CM

## 2017-12-13 DIAGNOSIS — R13.19 OTHER DYSPHAGIA: Primary | ICD-10-CM

## 2017-12-13 DIAGNOSIS — Z87.11 HISTORY OF GASTRIC ULCER: ICD-10-CM

## 2017-12-13 DIAGNOSIS — K21.00 GASTROESOPHAGEAL REFLUX DISEASE WITH ESOPHAGITIS: ICD-10-CM

## 2017-12-13 PROCEDURE — 99213 OFFICE O/P EST LOW 20 MIN: CPT | Performed by: NURSE PRACTITIONER

## 2017-12-13 RX ORDER — PANTOPRAZOLE SODIUM 40 MG/1
40 TABLET, DELAYED RELEASE ORAL DAILY
Qty: 90 TABLET | Refills: 3 | Status: SHIPPED | OUTPATIENT
Start: 2017-12-13 | End: 2018-08-02 | Stop reason: SDUPTHER

## 2017-12-13 NOTE — PROGRESS NOTES
Chief Complaint   Patient presents with   • Difficulty Swallowing       Subjective     HPI    Difficulty swallowing as day progresses.  She feels solids/liquids become stuck in mid esophagus.  Equal amount of difficulty with solids and liquids.  Difficulty swallowing present for over one year and worsening.  Intermittent nausea.   No abdominal pain.  No changes in bowels.  No BRBPR or melena stool.  She does experience coughing spells that will cause her to have headache.  Does not take Protonix daily, only prn.  Takes ASA on daily basis.  Denies use of other NSAIDs.    Endoscopy (Dr Shelton) Jan 2016 - gastric ulcer, esophagitis    Past Medical History:   Diagnosis Date   • Anxiety    • Arthritis    • CAD (coronary artery disease)    • CHF (congestive heart failure)    • Depression    • Diabetes mellitus    • HTN (hypertension)    • PONV (postoperative nausea and vomiting)    • Vitamin D deficiency        Past Surgical History:   Procedure Laterality Date   • APPENDECTOMY     • CARDIAC CATHETERIZATION      [place]   • CORONARY ARTERY BYPASS GRAFT  1992    x 2   • CORONARY STENT PLACEMENT  2014    X 5   • ENDOSCOPY N/A 1/4/2017    Procedure: ESOPHAGOGASTRODUODENOSCOPY WITH ANESTHESIA;  Surgeon: Maico Shelton DO;  Location: [facility] ENDOSCOPY;  Service:    • HERNIA REPAIR     • SHOULDER SURGERY     • TONSILLECTOMY         Outpatient Prescriptions Marked as Taking for the 12/13/17 encounter (Office Visit) with LATASHA Tavarez   Medication Sig Dispense Refill   • aspirin 81 MG EC tablet Take 1 tablet by mouth Daily. 90 tablet 3   • atenolol (TENORMIN) 50 MG tablet Take 1 tablet by mouth Daily. 90 tablet 3   • atorvastatin (LIPITOR) 40 MG tablet Take 1 tablet by mouth Daily. 90 tablet 3   • Blood Glucose Monitoring Suppl (ONE TOUCH ULTRA MINI) W/DEVICE kit Inject 1 strip under the skin 2 (Two) Times a Day. 1 each 11   • clopidogrel (PLAVIX) 75 MG tablet Take 1 tablet by mouth Daily. 90 tablet 3   •  hydrochlorothiazide (HYDRODIURIL) 25 MG tablet Take 1 tablet by mouth Daily. 90 tablet 3   • HYDROcodone-acetaminophen (NORCO) 7.5-325 MG per tablet Take 1 tablet by mouth Daily As Needed for Moderate Pain (4-6). 10 tablet 0   • lisinopril (PRINIVIL,ZESTRIL) 20 MG tablet Take 20 mg by mouth Daily.     • losartan (COZAAR) 50 MG tablet Take 1 tablet by mouth Daily. 90 tablet 1   • melatonin 5 MG tablet tablet Take 1 tablet by mouth At Night As Needed (insomnia). 90 tablet 3   • metFORMIN (GLUCOPHAGE) 500 MG tablet Take 1 tablet by mouth 2 (Two) Times a Day With Meals. 180 tablet 2   • nitroglycerin (NITROLINGUAL) 0.4 MG/SPRAY spray Place 1 spray under the tongue Every 5 (Five) Minutes As Needed for Chest Pain. 12 g 2   • ONE TOUCH ULTRA TEST test strip 1 each by Other route 2 (Two) Times a Day. 180 each 1   • pantoprazole (PROTONIX) 40 MG EC tablet Take 1 tablet by mouth Daily. 90 tablet 3   • tiZANidine (ZANAFLEX) 4 MG tablet Take 4 mg by mouth At Night As Needed for Muscle Spasms.     • venlafaxine (EFFEXOR) 50 MG tablet Take 1 tablet by mouth 2 (Two) Times a Day. 180 tablet 3   • [DISCONTINUED] pantoprazole (PROTONIX) 40 MG EC tablet Take 1 tablet by mouth Daily. 90 tablet 3       Allergies   Allergen Reactions   • Demerol [Meperidine] GI Intolerance   • Chantix [Varenicline] Hallucinations   • Meperidine And Related GI Intolerance   • Latex Rash     Pt states she is not allergic       Social History     Social History   • Marital status:      Spouse name: N/A   • Number of children: N/A   • Years of education: N/A     Occupational History   • Not on file.     Social History Main Topics   • Smoking status: Former Smoker     Years: 35.00     Types: Cigarettes     Quit date: 1/1/2012   • Smokeless tobacco: Never Used   • Alcohol use No   • Drug use: No   • Sexual activity: Defer     Other Topics Concern   • Not on file     Social History Narrative       Family History   Problem Relation Age of Onset   • Breast  "cancer Mother    • Lung cancer Mother    • No Known Problems Father    • Arrhythmia Maternal Grandmother    • Heart disease Paternal Grandmother    • Diabetes Paternal Grandmother    • Heart disease Paternal Grandfather    • Diabetes Paternal Grandfather    • Lung cancer Maternal Grandfather    • Colon cancer Neg Hx    • Colon polyps Neg Hx    • Liver cancer Neg Hx    • Liver disease Neg Hx        Review of Systems   Constitutional: Negative for fatigue, fever and unexpected weight change.   HENT: Negative for hearing loss, sore throat and voice change.    Eyes: Negative for visual disturbance.   Respiratory: Positive for cough. Negative for shortness of breath and wheezing.    Cardiovascular: Negative for chest pain and palpitations.   Gastrointestinal: Negative for abdominal pain, blood in stool and vomiting.   Endocrine: Negative for polydipsia and polyuria.   Genitourinary: Negative for difficulty urinating, dysuria, hematuria and urgency.   Musculoskeletal: Negative for joint swelling and myalgias.   Skin: Negative for color change, rash and wound.   Neurological: Negative for dizziness, tremors, seizures and syncope.   Hematological: Does not bruise/bleed easily.   Psychiatric/Behavioral: Negative for agitation and confusion. The patient is not nervous/anxious.        Objective     Vitals:    12/13/17 0941   BP: 136/62   BP Location: Left arm   Patient Position: Sitting   Cuff Size: Adult   Pulse: 58   Temp: 96.4 °F (35.8 °C)   SpO2: 100%   Weight: 83.9 kg (185 lb)   Height: 167.6 cm (66\")     Body mass index is 29.86 kg/(m^2).    Physical Exam   Constitutional: She is oriented to person, place, and time. She appears well-developed and well-nourished.   HENT:   Head: Normocephalic and atraumatic.   Eyes: Conjunctivae are normal. Pupils are equal, round, and reactive to light. No scleral icterus.   Neck: No JVD present. No thyroid mass and no thyromegaly present.   Cardiovascular: Normal rate, regular rhythm " and normal heart sounds.  Exam reveals no gallop and no friction rub.    No murmur heard.  Pulmonary/Chest: Effort normal and breath sounds normal. No accessory muscle usage. No respiratory distress. She has no wheezes. She has no rales.   Abdominal: Soft. Bowel sounds are normal. She exhibits no distension, no ascites and no mass. There is no splenomegaly or hepatomegaly. There is no tenderness. There is no rebound and no guarding.   Genitourinary:   Genitourinary Comments: Rectal-Did not examine   Musculoskeletal: Normal range of motion. She exhibits no edema.   Neurological: She is alert and oriented to person, place, and time.   Deemed a reliable historian, able to converse without difficulty and able to move all extremities without difficulty   Skin: Skin is warm and dry.   Psychiatric: She has a normal mood and affect. Her behavior is normal.       Imaging Results (most recent)     None          Assessment/Plan     Linda was seen today for difficulty swallowing.    Diagnoses and all orders for this visit:    Other dysphagia  -     Case Request; Standing  -     Implement Anesthesia Orders Day of Procedure; Standing  -     Obtain Informed Consent; Standing  -     Case Request    Gastroesophageal reflux disease with esophagitis  -     pantoprazole (PROTONIX) 40 MG EC tablet; Take 1 tablet by mouth Daily.    History of gastric ulcer    Anticoagulated        ESOPHAGOGASTRODUODENOSCOPY WITH ANESTHESIA (N/A)    There are no Patient Instructions on file for this visit.  Take Protonix daily, 30 min prior to breakfast on empty stomach  Hold Plavix 5 days prior to procedure    Patient is to continue all blood pressure and cardiac medications prior to procedure.     The risk of the endoscopy were discussed in detail.  We discussed the risk of perforation (one out of [id number], riskier with dilation), bleeding (one out of 500), and the rare risks of infection, adverse reaction to anesthesia, respiratory failure, cardiac  failure including MI and adverse reaction to medications, etc.  We discussed consequences that could occur if a risk were to develop such as the need for hospitalization, blood transfusion, surgical intervention, medications, pain and disability and death.  Alternatives include not doing anything, or pursuing an UGI series which only offers a diagnosis with potential less accuracy compared to egd.  The patient verbalizes understanding and agrees to proceed.

## 2017-12-26 ENCOUNTER — OFFICE VISIT (OUTPATIENT)
Dept: NEUROLOGY | Facility: CLINIC | Age: 67
End: 2017-12-26

## 2017-12-26 ENCOUNTER — OFFICE VISIT (OUTPATIENT)
Dept: VASCULAR SURGERY | Age: 67
End: 2017-12-26
Payer: MEDICARE

## 2017-12-26 VITALS
RESPIRATION RATE: 18 BRPM | DIASTOLIC BLOOD PRESSURE: 72 MMHG | HEIGHT: 66 IN | HEART RATE: 74 BPM | WEIGHT: 184 LBS | BODY MASS INDEX: 29.57 KG/M2 | SYSTOLIC BLOOD PRESSURE: 138 MMHG

## 2017-12-26 VITALS
HEART RATE: 67 BPM | DIASTOLIC BLOOD PRESSURE: 74 MMHG | HEIGHT: 66 IN | TEMPERATURE: 98.3 F | SYSTOLIC BLOOD PRESSURE: 140 MMHG

## 2017-12-26 DIAGNOSIS — R26.89 IMBALANCE: Primary | ICD-10-CM

## 2017-12-26 DIAGNOSIS — I10 ESSENTIAL (PRIMARY) HYPERTENSION: ICD-10-CM

## 2017-12-26 DIAGNOSIS — R47.01 EXPRESSIVE APHASIA: ICD-10-CM

## 2017-12-26 DIAGNOSIS — R47.81 SLURRED SPEECH: ICD-10-CM

## 2017-12-26 DIAGNOSIS — I65.23 BILATERAL CAROTID ARTERY STENOSIS: Primary | ICD-10-CM

## 2017-12-26 DIAGNOSIS — M54.2 NECK PAIN: ICD-10-CM

## 2017-12-26 DIAGNOSIS — Z78.9 DIFFICULTY COMPREHENDING SPEECH: ICD-10-CM

## 2017-12-26 DIAGNOSIS — I65.22 STENOSIS OF LEFT CAROTID ARTERY: ICD-10-CM

## 2017-12-26 PROCEDURE — G8421 BMI NOT CALCULATED: HCPCS | Performed by: PHYSICIAN ASSISTANT

## 2017-12-26 PROCEDURE — 3014F SCREEN MAMMO DOC REV: CPT | Performed by: PHYSICIAN ASSISTANT

## 2017-12-26 PROCEDURE — G8484 FLU IMMUNIZE NO ADMIN: HCPCS | Performed by: PHYSICIAN ASSISTANT

## 2017-12-26 PROCEDURE — 99203 OFFICE O/P NEW LOW 30 MIN: CPT | Performed by: PHYSICIAN ASSISTANT

## 2017-12-26 PROCEDURE — G8427 DOCREV CUR MEDS BY ELIG CLIN: HCPCS | Performed by: PHYSICIAN ASSISTANT

## 2017-12-26 PROCEDURE — 1123F ACP DISCUSS/DSCN MKR DOCD: CPT | Performed by: PHYSICIAN ASSISTANT

## 2017-12-26 PROCEDURE — 1090F PRES/ABSN URINE INCON ASSESS: CPT | Performed by: PHYSICIAN ASSISTANT

## 2017-12-26 PROCEDURE — G8598 ASA/ANTIPLAT THER USED: HCPCS | Performed by: PHYSICIAN ASSISTANT

## 2017-12-26 PROCEDURE — G8400 PT W/DXA NO RESULTS DOC: HCPCS | Performed by: PHYSICIAN ASSISTANT

## 2017-12-26 PROCEDURE — 99204 OFFICE O/P NEW MOD 45 MIN: CPT | Performed by: PSYCHIATRY & NEUROLOGY

## 2017-12-26 PROCEDURE — 3017F COLORECTAL CA SCREEN DOC REV: CPT | Performed by: PHYSICIAN ASSISTANT

## 2017-12-26 PROCEDURE — 4040F PNEUMOC VAC/ADMIN/RCVD: CPT | Performed by: PHYSICIAN ASSISTANT

## 2017-12-26 PROCEDURE — 1036F TOBACCO NON-USER: CPT | Performed by: PHYSICIAN ASSISTANT

## 2017-12-26 RX ORDER — TIZANIDINE 4 MG/1
4 TABLET ORAL EVERY 6 HOURS PRN
COMMUNITY
End: 2018-05-10

## 2017-12-26 RX ORDER — CLOPIDOGREL BISULFATE 75 MG/1
75 TABLET ORAL DAILY
COMMUNITY

## 2017-12-26 RX ORDER — LISINOPRIL 20 MG/1
20 TABLET ORAL DAILY
COMMUNITY

## 2017-12-26 RX ORDER — VENLAFAXINE 75 MG/1
75 TABLET ORAL DAILY
COMMUNITY
End: 2018-08-02 | Stop reason: SDUPTHER

## 2017-12-26 RX ORDER — VENLAFAXINE 50 MG/1
50 TABLET ORAL 3 TIMES DAILY
COMMUNITY
End: 2018-01-11

## 2017-12-26 RX ORDER — HYDROCODONE BITARTRATE AND ACETAMINOPHEN 7.5; 325 MG/1; MG/1
1 TABLET ORAL EVERY 6 HOURS PRN
COMMUNITY

## 2017-12-26 RX ORDER — ATENOLOL 50 MG/1
50 TABLET ORAL DAILY
COMMUNITY

## 2017-12-26 RX ORDER — PANTOPRAZOLE SODIUM 40 MG/1
40 TABLET, DELAYED RELEASE ORAL DAILY
COMMUNITY

## 2017-12-26 RX ORDER — ATORVASTATIN CALCIUM 40 MG/1
40 TABLET, FILM COATED ORAL NIGHTLY
COMMUNITY

## 2017-12-26 RX ORDER — HYDROCHLOROTHIAZIDE 25 MG/1
25 TABLET ORAL DAILY
COMMUNITY

## 2017-12-26 RX ORDER — VENLAFAXINE HYDROCHLORIDE 75 MG/1
75 TABLET, EXTENDED RELEASE ORAL
COMMUNITY

## 2017-12-26 NOTE — PATIENT INSTRUCTIONS
If further episodes occurs she is to go to emergency room immediately to be evaluated.  Patient is to go to see the vascular surgeon Dr. Morrison today at 2 o'clock

## 2017-12-26 NOTE — PROGRESS NOTES
Subjective   Linda JOHNSON [facility], 1950, is a female who is being seen today for   Chief Complaint   Patient presents with   • Numbness   • Headache       HISTORY OF PRESENT ILLNESS:Patient seen for episodes of imbalance and speech difficulty.  This started about 4-5 months ago and is had possibly four episodes since that time.  Last one was 1 month ago.  The first one she said she was sitting down to eat felt funny or tongue became numb and she heard sounds behind her ears bilaterally.  She had trouble talking and became off balance.  She was slurring and babbling and went to the ER at [facility] where she was never seen and left the ER.  She was nauseated that time.  That episode lasted about an hour.   Episodes since then have lasted less time.  Patient states that after this she had some fatigue and slight headache but normally she does not have headaches patient has a family history of stroke.  Patient has a history of 2 open heart surgeries done several years ago and is on Plavix and 81 mg coated baby aspirin.  Patient has been diagnosed diabetic and has recently been put on metformin.  Patient did not check her blood sugars with any of these episodes.  Her chem profile in August showed a blood sugar of 248.  Total cholesterol in February of this year was 157 with triglycerides 123.  Amylase was slightly elevated last year.  Lactic acid was elevated last year.  Patient is had a carotid ultrasound that showed 50-69% stenosis left internal carotid artery with antegrade flow in the vertebrals.  This was done and the 12/4/17.  Patient is had an echocardiogram done last year and followed by Dr. Mak.  Echocardiogram did  show intra-atrial septum intact.  Patient said with one of her episodes her eyes got bloodshot.  Patient has had no history of head trauma or loss of consciousness or seizures.  Patient has had chronic neck pain radiating to the right scapula and had some plain x-rays of the neck that showed some facet  abnormalities.  Patient has has heart stents and stents in her groin but all these an been over a year old.  Patient denies any chest pain or palpitation with these episodes    REVIEW OF SYSTEMS:   GENERAL: No fever/chills  PULMONARY: No acute shortness of breath  CVS:  As above  GASTROINTESTINAL: No acute GI distress except as above  GENITOURINARY: No  distress  GYN: No GYN distress  MUSCULOSKELETAL: No musculoskeletal symptoms other than as above  HEENT:  No acute vision changes  ENDOCRINE:  No acute endocrine symptoms  PSYCHIATRIC: No acute psychiatric symptoms    HEMATOLOGY: No anemia  SKIN: No skin changes  Family history reviewed and otherwise noncontributory except for as above  Social history: Patient denies smoking or drug or alcohol use at this time      PHYSICAL EXAMINATION:    GENERAL: No acute distress  CRANIUM: Normocephalic/atraumatic  HEENT: EOMs intact without nystagmus and fields full to confrontation       EYES: No acute fundic abnormalities.  Pupils equal round reactive to light.       EARS: Tympanic membranes normal hears tuning fork bilaterally        THROAT: No oropharynx abnormalities       NECK:  No bruits/no lymphadenopathy  CHEST: No acute cardiopulmonary abnormalities by auscultation  ABDOMEN: Nondistended  EXTREMITIES: Pulses symmetric  NEURO: Patient alert and follows commands without difficulty  SPEECH:  Normal    CRANIAL NERVES:  Motor sensory about the face normal and symmetric    MOTOR STRENGTH:  Motor strength upper and lower extremities normal  STATION AND GAIT:  Gait normal/Romberg negative  CEREBELLAR:  Finger-nose and heel shin normal  SENSORY:  Slight decrease in pin and vibration distal to proximal to the ankles bilaterally.  Otherwise normal pin and vibration throughout  REFLEXES:  Reflexes decreased absent throughout upper and lower extremities without Babinski's    ASSESSMENT AND PLAN:  Patient with history of episodes of speech difficulty and imbalance.  I cannot rule  out that these are possible TIAs.  With the left carotid 50-69% stenosis I am having the vascular surgeon see the patient today to decide what further testing needed from his standpoint.  I am getting MRI/MRA brain and EEG and further blood work.  Patient is notified that if further episode occurs says she is to go the emergency room immediately to rule out stroke or TIA.  Patient is be checking her blood sugars regularly      Linda was seen today for numbness and headache.    Diagnoses and all orders for this visit:    Imbalance  -     MRI Brain Without Contrast; Future  -     EEG; Future  -     MRI Angiogram Head Without Contrast; Future  -     CBC & Differential; Future  -     Comprehensive Metabolic Panel; Future  -     Lipid Panel; Future  -     Magnesium; Future  -     Sedimentation Rate; Future  -     T4, Free; Future  -     Vitamin B12; Future  -     Folate; Future  -     P2Y12 Platelet Inhibition (PPI); Future    Difficulty comprehending speech  -     MRI Brain Without Contrast; Future  -     EEG; Future  -     MRI Angiogram Head Without Contrast; Future    Stenosis of left carotid artery  -     Ambulatory Referral to Vascular Surgery    Neck pain  -     MRI Cervical Spine Without Contrast; Future    Essential (primary) hypertension   -     Lipid Panel; Future  -     T4, Free; Future

## 2017-12-26 NOTE — PROGRESS NOTES
Patient Care Team:  Monty Shepard as PCP - Javier Griffin MD as Consulting Physician (Cardiology)  Valencia Castellano MD (Family Medicine)      History and Physical:    Ms. Palak Medrano presents for evaluation of carotid artery stenosis and possible TIA's. Current medications include statin, asa and Plavix daily. She denies a history of CVA. She reports that in April she ate some jalapeno chips which she had not eaten in the past. She reports that her tongue became numb, she had some facial paraesthesias and speech difficulties (speech garbled and she was having a hard time finding her words). She denied any AF or lateralizing numbness/tingling or weakness. She presented to the ER but never got to see anyone so she left. She reports that she has have several of the same symptoms since then, with the last time occurring approximately 2 weeks ago. She had a CDU on 12/4/17 which showed a < 50% right ICA stenosis and left ICA stenosis in the 50-69% range. Vee Farmer is a 74 y.o. female with the following history reviewed and recorded in [facility]: There are no active problems to display for this patient. Current Outpatient Prescriptions   Medication Sig Dispense Refill    HYDROcodone-acetaminophen (NORCO) 7.5-325 MG per tablet Take 1 tablet by mouth every 6 hours as needed for Pain .       aspirin 81 MG tablet Take 81 mg by mouth daily      atenolol (TENORMIN) 50 MG tablet Take 50 mg by mouth daily      atorvastatin (LIPITOR) 40 MG tablet Take 40 mg by mouth daily      clopidogrel (PLAVIX) 75 MG tablet Take 75 mg by mouth daily      hydrochlorothiazide (HYDRODIURIL) 25 MG tablet Take 25 mg by mouth daily      lisinopril (PRINIVIL;ZESTRIL) 20 MG tablet Take 20 mg by mouth daily      diphenhydrAMINE HCl, Sleep, (SLEEP AID) 50 MG CAPS Take by mouth      metFORMIN (GLUCOPHAGE) 500 MG tablet Take 500 mg by mouth 2 times daily (with meals)      glucose blood VI test strips ([address] ULTRA TEST VI) strip 1 each by In Vitro route daily As needed.  pantoprazole (PROTONIX) 40 MG tablet Take 40 mg by mouth daily      tiZANidine (ZANAFLEX) 4 MG tablet Take 4 mg by mouth every 6 hours as needed      venlafaxine 75 MG extended release tablet Take 75 mg by mouth daily (with breakfast)      venlafaxine (EFFEXOR) 50 MG tablet Take 50 mg by mouth 3 times daily       No current facility-administered medications for this visit. Allergies: Review of patient's allergies indicates not on file. Past Medical History:   Diagnosis Date    Acid reflux     Anxiety     Blockage of coronary artery of heart (HCC)     CHF [facility]congestive heart failure) ([facility])     Hyperlipidemia     Hypertension     Osteoarthritis     rheumatoid    PVD (peripheral vascular disease) ([facility])     Raynaud disease     Stomach ulcer     Type 2 diabetes mellitus without complication ([facility])      Past Surgical History:   Procedure Laterality Date    APPENDECTOMY      CARDIAC SURGERY      x2    CORONARY ANGIOPLASTY WITH STENT PLACEMENT      groin    INCISIONAL HERNIA REPAIR      TONSILLECTOMY AND ADENOIDECTOMY       Family History   Problem Relation Age of Onset    Heart Attack Mother     Stroke Mother     Other Mother      aneurysm    Bleeding Prob Sister      blood clot     Social History   Substance Use Topics    Smoking status: Former Smoker     Quit date: 12/26/2011    Smokeless tobacco: Never Used    Alcohol use No       Old records have been obtained from the referring provider. These records have been reviewed and summarized. Review of Systems    Constitutional  no significant activity change, appetite change, or unexpected weight change. No fever or chills. No diaphoresis or significant fatigue. HENT  no significant rhinorrhea or epistaxis. No tinnitus or significant hearing loss. Eyes  no sudden vision change or amaurosis. Respiratory  no significant shortness of breath, wheezing, or stridor. edema. Neurologic  alert and oriented X 3. Physiologic. Tongue midline. Face symmetric. No lateralizing weakness noted. No neurologic deficits noted today. Skin  warm, dry, and intact. No rash, erythema, or pallor. Psychiatric  mood, affect, and behavior appear normal.  Judgment and thought processes appear normal.    Risk factors for atherosclerosis of all vascular beds have been reviewed with the patient including:  Family history, tobacco abuse in all forms, elevated cholesterol, hyperlipidemia, and diabetes. Assessment    1. Bilateral carotid artery stenosis    2. Slurred speech    3. Expressive aphasia          Plan    Recommend ontinue ASA EC 81 mg daily  Recommend continue Plavix 75 mg daily  Strongly encourage continue  statin therapy  Recommend no smoking  Orders Placed This Encounter   Procedures    CTA Neck W Contrast     With I stat creatinine - call if greater than 1.3     Standing Status:   Future     Number of Occurrences:   1     Standing Expiration Date:   1/5/2018     Scheduling Instructions:      30 minute time slot     Order Specific Question:   Reason for exam:     Answer:   GAIL, slurred speech, expressive aphasia     Patient instructed to call or proceed to the emergency room with any symptoms of lateralizing weakness, loss of vision in one eye, or episodes slurred speech. We will obtain a CTA neck, after   has reviewed the images, we will call her with further recommendation    Addendum:  Dr. Brenna Reyes has reviewed the images of the CTA neck and would like to see the patient in the office to discuss possible options. We will schedule an office appointment and notify the patient.

## 2017-12-27 ENCOUNTER — HOSPITAL ENCOUNTER (OUTPATIENT)
Dept: CT IMAGING | Age: 67
Discharge: HOME | End: 2017-12-27
Payer: MEDICARE

## 2017-12-27 DIAGNOSIS — I65.23 BILATERAL CAROTID ARTERY STENOSIS: ICD-10-CM

## 2017-12-27 DIAGNOSIS — R47.01 EXPRESSIVE APHASIA: ICD-10-CM

## 2017-12-27 DIAGNOSIS — R47.81 SLURRED SPEECH: ICD-10-CM

## 2017-12-27 LAB
GFR NON-AFRICAN AMERICAN: >60
PERFORMED ON: NORMAL
POC CREATININE: 0.7 MG/DL (ref 0.3–1.3)
POC SAMPLE TYPE: NORMAL

## 2017-12-27 PROCEDURE — 82565 ASSAY OF CREATININE: CPT

## 2017-12-27 PROCEDURE — 6360000004 HC RX CONTRAST MEDICATION: Performed by: PHYSICIAN ASSISTANT

## 2017-12-27 PROCEDURE — 70498 CT ANGIOGRAPHY NECK: CPT

## 2017-12-27 RX ADMIN — IOPAMIDOL 90 ML: 755 INJECTION, SOLUTION INTRAVENOUS at 08:50

## 2017-12-28 ENCOUNTER — OFFICE VISIT (OUTPATIENT)
Dept: INTERNAL MEDICINE | Facility: CLINIC | Age: 67
End: 2017-12-28

## 2017-12-28 VITALS
RESPIRATION RATE: 16 BRPM | BODY MASS INDEX: 30.17 KG/M2 | DIASTOLIC BLOOD PRESSURE: 75 MMHG | HEART RATE: 68 BPM | OXYGEN SATURATION: 97 % | TEMPERATURE: 98.3 F | WEIGHT: 186.9 LBS | SYSTOLIC BLOOD PRESSURE: 158 MMHG

## 2017-12-28 DIAGNOSIS — R05.9 COUGH: ICD-10-CM

## 2017-12-28 DIAGNOSIS — J40 BRONCHITIS: Primary | ICD-10-CM

## 2017-12-28 DIAGNOSIS — R03.0 ELEVATED BLOOD PRESSURE READING: ICD-10-CM

## 2017-12-28 DIAGNOSIS — E11.9 TYPE 2 DIABETES MELLITUS WITHOUT COMPLICATION, WITHOUT LONG-TERM CURRENT USE OF INSULIN (HCC): ICD-10-CM

## 2017-12-28 PROCEDURE — 99213 OFFICE O/P EST LOW 20 MIN: CPT | Performed by: NURSE PRACTITIONER

## 2017-12-28 RX ORDER — PROMETHAZINE HYDROCHLORIDE AND PHENYLEPHRINE HYDROCHLORIDE 6.25; 5 MG/5ML; MG/5ML
5 SYRUP ORAL EVERY 4 HOURS PRN
Qty: 180 ML | Refills: 0 | Status: SHIPPED | OUTPATIENT
Start: 2017-12-28 | End: 2018-01-03

## 2017-12-28 RX ORDER — ALBUTEROL SULFATE 90 UG/1
2 AEROSOL, METERED RESPIRATORY (INHALATION) EVERY 4 HOURS PRN
Qty: 1 INHALER | Refills: 0 | Status: SHIPPED | OUTPATIENT
Start: 2017-12-28 | End: 2018-06-14

## 2017-12-28 RX ORDER — BENZONATATE 100 MG/1
100 CAPSULE ORAL 3 TIMES DAILY PRN
Qty: 90 CAPSULE | Refills: 3 | Status: SHIPPED | OUTPATIENT
Start: 2017-12-28 | End: 2018-03-19

## 2017-12-28 NOTE — PROGRESS NOTES
CC: cough    History:  Linda Pickens is a 67 y.o. female who presents today for evaluation of the above problems.  Has had non productive cough with chills for about one week.  Is not aware of any fever and denies any nasal congestion or sore throat, however she says that the increased amount of coughing has made her chronic neck pain worse.  Was taking tessalon pearls, which were helpful, but she has run out of these.  Her speech is frequently interrupted during my exam due to coughing and she reports that she is unable to sleep at night due to the frequent coughing.  She requests a refill on her glucometer test strips as well.     ROS:  Review of Systems   Respiratory: Positive for cough, shortness of breath and wheezing.    Musculoskeletal: Positive for myalgias (chest wall pain with coughing) and neck pain.       Allergies   Allergen Reactions   • Demerol [Meperidine] GI Intolerance   • Chantix [Varenicline] Hallucinations   • Meperidine And Related GI Intolerance   • Latex Rash     Pt states she is not allergic     Past Medical History:   Diagnosis Date   • Anxiety    • Arthritis    • CAD (coronary artery disease)    • CHF (congestive heart failure)    • Depression    • Diabetes mellitus    • HTN (hypertension)    • PONV (postoperative nausea and vomiting)    • Vitamin D deficiency      Past Surgical History:   Procedure Laterality Date   • APPENDECTOMY     • CARDIAC CATHETERIZATION      [place]   • CORONARY ARTERY BYPASS GRAFT  1992    x 2   • CORONARY STENT PLACEMENT  2014    X 5   • ENDOSCOPY N/A 1/4/2017    Procedure: ESOPHAGOGASTRODUODENOSCOPY WITH ANESTHESIA;  Surgeon: Maico Shelton DO;  Location: [facility] END[facility]COPY;  Service:    • HERNIA REPAIR     • SHOULDER SURGERY     • TONSILLECTOMY       Family History   Problem Relation Age of Onset   • Breast cancer Mother    • Lung cancer Mother    • No Known Problems Father    • Arrhythmia Maternal Grandmother    • Heart disease Paternal Grandmother    •  Diabetes Paternal Grandmother    • Heart disease Paternal Grandfather    • Diabetes Paternal Grandfather    • Lung cancer Maternal Grandfather    • Colon cancer Neg Hx    • Colon polyps Neg Hx    • Liver cancer Neg Hx    • Liver disease Neg Hx       reports that she quit smoking about 5 years ago. Her smoking use included Cigarettes. She quit after 35.00 years of use. She has never used smokeless tobacco. She reports that she does not drink alcohol or use illicit drugs.      Current Outpatient Prescriptions:   •  aspirin 81 MG EC tablet, Take 1 tablet by mouth Daily., Disp: 90 tablet, Rfl: 3  •  atenolol (TENORMIN) 50 MG tablet, Take 1 tablet by mouth Daily., Disp: 90 tablet, Rfl: 3  •  atorvastatin (LIPITOR) 40 MG tablet, Take 1 tablet by mouth Daily., Disp: 90 tablet, Rfl: 3  •  Blood Glucose Monitoring Suppl (ONE TOUCH ULTRA MINI) W/DEVICE kit, Inject 1 strip under the skin 2 (Two) Times a Day., Disp: 1 each, Rfl: 11  •  clopidogrel (PLAVIX) 75 MG tablet, Take 1 tablet by mouth Daily., Disp: 90 tablet, Rfl: 3  •  DiphenhydrAMINE HCl, Sleep, 50 MG capsule, Take  by mouth., Disp: , Rfl:   •  hydrochlorothiazide (HYDRODIURIL) 25 MG tablet, Take 1 tablet by mouth Daily., Disp: 90 tablet, Rfl: 3  •  HYDROcodone-acetaminophen (NORCO) 7.5-325 MG per tablet, Take 1 tablet by mouth Daily As Needed for Moderate Pain (4-6)., Disp: 10 tablet, Rfl: 0  •  lisinopril (PRINIVIL,ZESTRIL) 20 MG tablet, Take 20 mg by mouth Daily., Disp: , Rfl:   •  metFORMIN (GLUCOPHAGE) 500 MG tablet, Take 1 tablet by mouth 2 (Two) Times a Day With Meals., Disp: 180 tablet, Rfl: 2  •  ONE TOUCH ULTRA TEST test strip, 1 each by Other route 2 (Two) Times a Day., Disp: 180 each, Rfl: 1  •  pantoprazole (PROTONIX) 40 MG EC tablet, Take 1 tablet by mouth Daily., Disp: 90 tablet, Rfl: 3  •  tiZANidine (ZANAFLEX) 4 MG tablet, Take 4 mg by mouth At Night As Needed for Muscle Spasms., Disp: , Rfl:   •  venlafaxine (EFFEXOR) 75 MG tablet, Take 75 mg by  mouth Daily., Disp: , Rfl:     OBJECTIVE:  /75 (BP Location: Left arm, Patient Position: Sitting, Cuff Size: Adult)  Pulse 68  Temp 98.3 °F (36.8 °C) (Oral)   Resp 16  Wt 84.8 kg (186 lb 14.4 oz)  SpO2 97%  BMI 30.17 kg/m2   Physical Exam   Constitutional: She is oriented to person, place, and time. She appears well-developed and well-nourished.   Cardiovascular: Normal rate.    Pulmonary/Chest: Effort normal. No respiratory distress. She has wheezes in the left middle field. She exhibits tenderness.   Neurological: She is alert and oriented to person, place, and time.   Skin: Skin is warm and dry. No erythema. No pallor.   Psychiatric: She has a normal mood and affect. Her behavior is normal.   Vitals reviewed.      Assessment/Plan    Diagnoses and all orders for this visit:    Bronchitis  Cough  -     benzonatate (TESSALON PERLES) 100 MG capsule; Take 1 capsule by mouth 3 (Three) Times a Day As Needed for Cough.  -     promethazine-phenylephrine 6.25-5 MG/5ML syrup syrup; Take 5 mL by mouth Every 4 (Four) Hours As Needed (cough).  -     albuterol (PROVENTIL HFA;VENTOLIN HFA) 108 (90 Base) MCG/ACT inhaler; Inhale 2 puffs Every 4 (Four) Hours As Needed for Wheezing.  At this point will treat as viral.  Instructed her to phone our office if not better by Tuesday and will consider antibiotic therapy at that time.  Encouraged her to use the promethazine syrup at night to help her rest and the tessalon pearls during the day.  Albuterol for wheezing and shortness of breath. Advised that there were currently many viral infections prevalent in the community and that treating with an antibiotic currently would not be efficacious. Understanding verbalized.    Type 2 diabetes mellitus without complication, without long-term current use of insulin  -     ONE TOUCH ULTRA TEST test strip; 1 each by Other route 3 (Three) Times a Day.    Elevated blood pressure reading  No changes to therapy today.  Will monitor at  future appointments.  This is a one time elevation during time of illness.      An After Visit Summary was printed and given to the patient at discharge.  Return for Next scheduled follow up.         LATASHA Davis 12/28/2017

## 2017-12-29 ENCOUNTER — TELEPHONE (OUTPATIENT)
Dept: NEUROLOGY | Facility: CLINIC | Age: 67
End: 2017-12-29

## 2017-12-29 RX ORDER — LORAZEPAM 0.5 MG/1
TABLET ORAL
Qty: 1 TABLET | Refills: 0 | Status: SHIPPED | OUTPATIENT
Start: 2017-12-29 | End: 2018-01-30

## 2017-12-29 NOTE — TELEPHONE ENCOUNTER
Called Ativan 0.5 mg tabs 1 tab po 30 minutes before procedure #1 with no refills to [facility].

## 2017-12-29 NOTE — TELEPHONE ENCOUNTER
Patient has been notified that she has been cleared to have the MRI.  She has ask to have something called in to take to relax her before the MRI.  Dr. Mcclendon has prescribed Ativan 0.5mg 1 tab 30 minutes before her procedure.  This prescription will be sent to [facility].  Patient has been given verbal instructions to have a  take her to and from her MRI appointment.  Patient voices understanding.

## 2018-01-03 ENCOUNTER — OFFICE VISIT (OUTPATIENT)
Dept: INTERNAL MEDICINE | Facility: CLINIC | Age: 68
End: 2018-01-03

## 2018-01-03 ENCOUNTER — TELEPHONE (OUTPATIENT)
Dept: INTERNAL MEDICINE | Facility: CLINIC | Age: 68
End: 2018-01-03

## 2018-01-03 VITALS
WEIGHT: 184.5 LBS | SYSTOLIC BLOOD PRESSURE: 166 MMHG | HEART RATE: 59 BPM | DIASTOLIC BLOOD PRESSURE: 68 MMHG | RESPIRATION RATE: 16 BRPM | TEMPERATURE: 98.3 F | OXYGEN SATURATION: 99 % | BODY MASS INDEX: 29.78 KG/M2

## 2018-01-03 DIAGNOSIS — I10 ESSENTIAL HYPERTENSION: ICD-10-CM

## 2018-01-03 DIAGNOSIS — R05.3 CHRONIC COUGH: Primary | ICD-10-CM

## 2018-01-03 DIAGNOSIS — R05.3 CHRONIC COUGH: ICD-10-CM

## 2018-01-03 DIAGNOSIS — J01.00 ACUTE NON-RECURRENT MAXILLARY SINUSITIS: ICD-10-CM

## 2018-01-03 DIAGNOSIS — R22.0 FACIAL SWELLING: ICD-10-CM

## 2018-01-03 PROCEDURE — 99214 OFFICE O/P EST MOD 30 MIN: CPT | Performed by: NURSE PRACTITIONER

## 2018-01-03 RX ORDER — BUDESONIDE AND FORMOTEROL FUMARATE DIHYDRATE 80; 4.5 UG/1; UG/1
2 AEROSOL RESPIRATORY (INHALATION)
Qty: 1 INHALER | Refills: 12 | Status: SHIPPED | OUTPATIENT
Start: 2018-01-03 | End: 2018-06-14

## 2018-01-03 RX ORDER — AMOXICILLIN AND CLAVULANATE POTASSIUM 875; 125 MG/1; MG/1
1 TABLET, FILM COATED ORAL EVERY 12 HOURS SCHEDULED
Qty: 14 TABLET | Refills: 0 | Status: SHIPPED | OUTPATIENT
Start: 2018-01-03 | End: 2018-01-30

## 2018-01-03 RX ORDER — BUDESONIDE AND FORMOTEROL FUMARATE DIHYDRATE 80; 4.5 UG/1; UG/1
2 AEROSOL RESPIRATORY (INHALATION)
Qty: 1 INHALER | Refills: 12 | Status: SHIPPED | OUTPATIENT
Start: 2018-01-03 | End: 2018-01-03 | Stop reason: SDUPTHER

## 2018-01-03 RX ORDER — GUAIFENESIN AND CODEINE PHOSPHATE 100; 10 MG/5ML; MG/5ML
5 SOLUTION ORAL 3 TIMES DAILY PRN
Qty: 180 ML | Refills: 0 | Status: SHIPPED | OUTPATIENT
Start: 2018-01-03 | End: 2018-03-19

## 2018-01-03 RX ORDER — DIPHENHYDRAMINE HCL 25 MG
25 TABLET ORAL EVERY 6 HOURS PRN
Status: CANCELLED
Start: 2018-01-03

## 2018-01-03 RX ORDER — RANITIDINE 150 MG/1
150 TABLET ORAL
Status: CANCELLED
Start: 2018-01-03

## 2018-01-03 RX ORDER — BUDESONIDE AND FORMOTEROL FUMARATE DIHYDRATE 80; 4.5 UG/1; UG/1
2 AEROSOL RESPIRATORY (INHALATION)
Qty: 1 INHALER | Refills: 5 | Status: CANCELLED | OUTPATIENT
Start: 2018-01-03

## 2018-01-03 RX ORDER — RANITIDINE 150 MG/1
150 TABLET ORAL 2 TIMES DAILY
Start: 2018-01-03 | End: 2018-01-30

## 2018-01-03 NOTE — TELEPHONE ENCOUNTER
Mrs. Pickens's cough medicine was sent to her mail order pharmacy.  It was supposed to be sent to [facility].

## 2018-01-03 NOTE — PROGRESS NOTES
CC: cough, sinus pain, facial swelling    History:  Linda Pickens is a 67 y.o. female who presents today for evaluation of the above problems.  Linda continues to have a chronic cough that is currently exacerbated, since 12/21/17, with yellow sputum and keeps her awake at night. In addition to this, she is having daily to ever other day episodes of mouth tingling with left sided facial swelling, specifically around the left ear and down into the left mandibular area. These episodes are accompanied by nausea and emesis and last 5 - 20 minutes and are often followed by a bloodshot appearance to her eyes. She admits that these episodes make her very fearful.   Linda has been evaluated with a chest CT in April 2017, as well as a carotid ultrasound on 12/4/17,  She is having MRIs of the brain, head, and neck tomorrow.  She does have maxillary sinus pain and pressure as well as congestion that has been present since 12/21/17. She states that she can not stand to use any nasal sprays. BP remains elevated today, however, Linda believes this is due to her worrying about her episodes and does not wish to make any changes today.    ROS:  Review of Systems   Constitutional: Negative for chills, fatigue, fever and unexpected weight change.   HENT: Positive for postnasal drip, sinus pain and sinus pressure. Negative for congestion, rhinorrhea, sneezing and sore throat.    Eyes: Positive for redness. Negative for visual disturbance.   Respiratory: Positive for cough. Negative for chest tightness, shortness of breath and wheezing.    Cardiovascular: Negative for chest pain and leg swelling.   Gastrointestinal: Negative for abdominal distention, abdominal pain, blood in stool, constipation, diarrhea, nausea and vomiting.   Endocrine: Negative for polyuria.   Genitourinary: Negative for decreased urine volume, dysuria, hematuria and urgency.   Musculoskeletal: Negative for arthralgias and myalgias.   Skin: Negative for rash.    Allergic/Immunologic: Negative for environmental allergies.   Neurological: Negative for dizziness, seizures, light-headedness and headaches.   Psychiatric/Behavioral: Negative for dysphoric mood and sleep disturbance.       Allergies   Allergen Reactions   • Demerol [Meperidine] GI Intolerance   • Chantix [Varenicline] Hallucinations   • Meperidine And Related GI Intolerance   • Latex Rash     Pt states she is not allergic     Past Medical History:   Diagnosis Date   • Anxiety    • Arthritis    • CAD (coronary artery disease)    • CHF (congestive heart failure)    • Depression    • Diabetes mellitus    • HTN (hypertension)    • PONV (postoperative nausea and vomiting)    • Vitamin D deficiency      Past Surgical History:   Procedure Laterality Date   • APPENDECTOMY     • CARDIAC CATHETERIZATION      [place]   • CORONARY ARTERY BYPASS GRAFT  1992    x 2   • CORONARY STENT PLACEMENT  2014    X 5   • ENDOSCOPY N/A 1/4/2017    Procedure: ESOPHAGOGASTRODUODENOSCOPY WITH ANESTHESIA;  Surgeon: Maico Shelton DO;  Location: [facility] END[facility]COPY;  Service:    • HERNIA REPAIR     • SHOULDER SURGERY     • TONSILLECTOMY       Family History   Problem Relation Age of Onset   • Breast cancer Mother    • Lung cancer Mother    • No Known Problems Father    • Arrhythmia Maternal Grandmother    • Heart disease Paternal Grandmother    • Diabetes Paternal Grandmother    • Heart disease Paternal Grandfather    • Diabetes Paternal Grandfather    • Lung cancer Maternal Grandfather    • Colon cancer Neg Hx    • Colon polyps Neg Hx    • Liver cancer Neg Hx    • Liver disease Neg Hx       reports that she quit smoking about 6 years ago. Her smoking use included Cigarettes. She quit after 35.00 years of use. She has never used smokeless tobacco. She reports that she does not drink alcohol or use illicit drugs.      Current Outpatient Prescriptions:   •  albuterol (PROVENTIL HFA;VENTOLIN HFA) 108 (90 Base) MCG/ACT inhaler, Inhale 2  puffs Every 4 (Four) Hours As Needed for Wheezing., Disp: 1 inhaler, Rfl: 0  •  aspirin 81 MG EC tablet, Take 1 tablet by mouth Daily., Disp: 90 tablet, Rfl: 3  •  atenolol (TENORMIN) 50 MG tablet, Take 1 tablet by mouth Daily., Disp: 90 tablet, Rfl: 3  •  atorvastatin (LIPITOR) 40 MG tablet, Take 1 tablet by mouth Daily., Disp: 90 tablet, Rfl: 3  •  benzonatate (TESSALON PERLES) 100 MG capsule, Take 1 capsule by mouth 3 (Three) Times a Day As Needed for Cough., Disp: 90 capsule, Rfl: 3  •  Blood Glucose Monitoring Suppl (ONE TOUCH ULTRA MINI) W/DEVICE kit, Inject 1 strip under the skin 2 (Two) Times a Day., Disp: 1 each, Rfl: 11  •  clopidogrel (PLAVIX) 75 MG tablet, Take 1 tablet by mouth Daily., Disp: 90 tablet, Rfl: 3  •  DiphenhydrAMINE HCl, Sleep, 50 MG capsule, Take  by mouth., Disp: , Rfl:   •  hydrochlorothiazide (HYDRODIURIL) 25 MG tablet, Take 1 tablet by mouth Daily., Disp: 90 tablet, Rfl: 3  •  HYDROcodone-acetaminophen (NORCO) 7.5-325 MG per tablet, Take 1 tablet by mouth Daily As Needed for Moderate Pain (4-6)., Disp: 10 tablet, Rfl: 0  •  lisinopril (PRINIVIL,ZESTRIL) 20 MG tablet, Take 20 mg by mouth Daily., Disp: , Rfl:   •  LORazepam (ATIVAN) 0.5 MG tablet, Take 1 tab by mouth 30 minutes before procedure x 1 dose. #1 with no refills, Disp: 1 tablet, Rfl: 0  •  metFORMIN (GLUCOPHAGE) 500 MG tablet, Take 1 tablet by mouth 2 (Two) Times a Day With Meals., Disp: 180 tablet, Rfl: 2  •  ONE TOUCH ULTRA TEST test strip, 1 each by Other route 3 (Three) Times a Day., Disp: 180 each, Rfl: 1  •  pantoprazole (PROTONIX) 40 MG EC tablet, Take 1 tablet by mouth Daily., Disp: 90 tablet, Rfl: 3  •  tiZANidine (ZANAFLEX) 4 MG tablet, Take 4 mg by mouth At Night As Needed for Muscle Spasms., Disp: , Rfl:   •  venlafaxine (EFFEXOR) 75 MG tablet, Take 75 mg by mouth Daily., Disp: , Rfl:         OBJECTIVE:  /68 (BP Location: Left arm, Patient Position: Sitting, Cuff Size: Adult)  Pulse 59  Temp 98.3 °F (36.8  °C) (Oral)   Resp 16  Wt 83.7 kg (184 lb 8 oz)  SpO2 99%  BMI 29.78 kg/m2   Physical Exam   Constitutional: She is oriented to person, place, and time. She appears well-developed and well-nourished. She is cooperative. She does not have a sickly appearance.   HENT:   Head: Normocephalic.       Right Ear: Tympanic membrane, external ear and ear canal normal.   Left Ear: External ear and ear canal normal.   Nose: Right sinus exhibits no maxillary sinus tenderness and no frontal sinus tenderness. Left sinus exhibits no maxillary sinus tenderness and no frontal sinus tenderness.   Mouth/Throat: Uvula is midline, oropharynx is clear and moist and mucous membranes are normal. No posterior oropharyngeal edema.   Unable to visualize Left TM.   Eyes: Conjunctivae and lids are normal. Right eye exhibits no discharge. Left eye exhibits no discharge.   Cardiovascular: Normal rate, regular rhythm and normal heart sounds.  Exam reveals no gallop and no friction rub.    No murmur heard.  Pulmonary/Chest: Effort normal and breath sounds normal. No tachypnea. No respiratory distress. She has no wheezes. She has no rales. She exhibits no tenderness.   Abdominal: Soft. Bowel sounds are normal. She exhibits no distension. There is no tenderness.   Neurological: She is alert and oriented to person, place, and time.   Skin: Skin is warm, dry and intact. No rash noted. She is not diaphoretic. No erythema.   Psychiatric: Her speech is normal and behavior is normal. Thought content normal. Her mood appears anxious.   Vitals reviewed.      Assessment/Plan    Diagnoses and all orders for this visit:    Chronic cough  -     guaifenesin-codeine (GUAIFENESIN AC) 100-10 MG/5ML liquid; Take 5 mL by mouth 3 (Three) Times a Day As Needed for Cough.  -     budesonide-formoterol (SYMBICORT) 80-4.5 MCG/ACT inhaler; Inhale 2 puffs 2 (Two) Times a Day.  PFT from August showed a mild restrictive ventilatory effect.  CT in April that was RADS category  "2.  Will initiate Symbicort today to treat her chronic symptoms. Dr. Lehman has agreed to order her guaifenesin-codeine cough syrup to help her rest at night.       Acute non-recurrent maxillary sinusitis  -     amoxicillin-clavulanate (AUGMENTIN) 875-125 MG per tablet; Take 1 tablet by mouth Every 12 (Twelve) Hours.   Her sinus pain and pressure and exacerbated cough has been present since 12/21.  Will treat for sinus infection today.     Facial swelling  -     raNITIdine (ZANTAC) 150 MG tablet; Take 1 tablet by mouth 2 (Two) Times a Day.   - dephenyhdramine (Benadryl) 50 mg; Take nightly  We will reevaluate her unusual episodes after her MRIs tomorrow.  However, they frequently occur with eating, and since the mouth tingling is accompanied by facial swelling, I would like for her to start Zantac and benadryl as an allergic phrophylaxis.      Essential hypertension  BP elevated at 166/68.  Linda does not wish to make changes today since she feels the elevation is just related to being upset over her \"episodes.\"  Will plan to adjust at her next office visit if no improvement.    An After Visit Summary was printed and given to the patient at discharge.  Return for Next scheduled follow up.         Ashley Drake 1/3/2018   "

## 2018-01-04 ENCOUNTER — HOSPITAL ENCOUNTER (OUTPATIENT)
Dept: MRI IMAGING | Facility: HOSPITAL | Age: 68
Discharge: HOME | End: 2018-01-04
Attending: PSYCHIATRY & NEUROLOGY

## 2018-01-04 ENCOUNTER — HOSPITAL ENCOUNTER (OUTPATIENT)
Dept: NEUROLOGY | Facility: HOSPITAL | Age: 68
Discharge: HOME | End: 2018-01-04
Attending: PSYCHIATRY & NEUROLOGY | Admitting: PSYCHIATRY & NEUROLOGY

## 2018-01-04 DIAGNOSIS — R26.89 IMBALANCE: ICD-10-CM

## 2018-01-04 DIAGNOSIS — Z78.9 DIFFICULTY COMPREHENDING SPEECH: ICD-10-CM

## 2018-01-04 DIAGNOSIS — M54.2 NECK PAIN: ICD-10-CM

## 2018-01-04 PROCEDURE — 95816 EEG AWAKE AND DROWSY: CPT | Performed by: PSYCHIATRY & NEUROLOGY

## 2018-01-04 PROCEDURE — 70551 MRI BRAIN STEM W/O DYE: CPT

## 2018-01-04 PROCEDURE — 95816 EEG AWAKE AND DROWSY: CPT

## 2018-01-04 PROCEDURE — 72141 MRI NECK SPINE W/O DYE: CPT

## 2018-01-08 ENCOUNTER — DOCUMENTATION (OUTPATIENT)
Dept: NEUROLOGY | Facility: CLINIC | Age: 68
End: 2018-01-08

## 2018-01-08 NOTE — PROGRESS NOTES
Patient calls stating she has her second MRI in the morning and she will need the Ativan again.  I did speak with  and he has given orders to call to [facility] Ativan 0.5 mg 1 tab po 30 minutes before her procedure #1 no refills.  This has been called to [facility].

## 2018-01-09 ENCOUNTER — HOSPITAL ENCOUNTER (OUTPATIENT)
Dept: MRI IMAGING | Facility: HOSPITAL | Age: 68
Discharge: HOME | End: 2018-01-09
Attending: PSYCHIATRY & NEUROLOGY | Admitting: PSYCHIATRY & NEUROLOGY

## 2018-01-09 ENCOUNTER — DOCUMENTATION (OUTPATIENT)
Dept: NEUROLOGY | Facility: CLINIC | Age: 68
End: 2018-01-09

## 2018-01-09 DIAGNOSIS — R26.89 IMBALANCE: ICD-10-CM

## 2018-01-09 DIAGNOSIS — Z78.9 DIFFICULTY COMPREHENDING SPEECH: ICD-10-CM

## 2018-01-09 PROCEDURE — 70544 MR ANGIOGRAPHY HEAD W/O DYE: CPT

## 2018-01-09 NOTE — PROGRESS NOTES
I contacted the patient about the results of her MRA brain showing some carotid intracranial stenosis in the cavernous area bilaterally left greater than right.  I am attempting to get hold of Dr. Morrison's group as the CTA neck that was done by them after seen in late December 2017 showed 50-70% stenosis in the left internal carotid artery.  I did discuss with the patient about potential risk of stroke / she is to continue the aspirin & Plavix and statin.  She is to go to emergency room immediately if any stroke symptoms

## 2018-01-10 ENCOUNTER — OFFICE VISIT (OUTPATIENT)
Dept: VASCULAR SURGERY | Age: 68
End: 2018-01-10
Payer: MEDICARE

## 2018-01-10 ENCOUNTER — DOCUMENTATION (OUTPATIENT)
Dept: NEUROLOGY | Facility: CLINIC | Age: 68
End: 2018-01-10

## 2018-01-10 ENCOUNTER — TELEPHONE (OUTPATIENT)
Dept: VASCULAR SURGERY | Age: 68
End: 2018-01-10

## 2018-01-10 ENCOUNTER — TELEPHONE (OUTPATIENT)
Dept: CARDIOLOGY | Facility: CLINIC | Age: 68
End: 2018-01-10

## 2018-01-10 VITALS
HEART RATE: 53 BPM | SYSTOLIC BLOOD PRESSURE: 120 MMHG | DIASTOLIC BLOOD PRESSURE: 72 MMHG | TEMPERATURE: 97.2 F | RESPIRATION RATE: 18 BRPM | OXYGEN SATURATION: 94 %

## 2018-01-10 DIAGNOSIS — I65.22 CAROTID ARTERY STENOSIS, SYMPTOMATIC, LEFT: ICD-10-CM

## 2018-01-10 DIAGNOSIS — I65.23 BILATERAL CAROTID ARTERY STENOSIS: Primary | ICD-10-CM

## 2018-01-10 PROCEDURE — G8400 PT W/DXA NO RESULTS DOC: HCPCS | Performed by: PHYSICIAN ASSISTANT

## 2018-01-10 PROCEDURE — G8484 FLU IMMUNIZE NO ADMIN: HCPCS | Performed by: PHYSICIAN ASSISTANT

## 2018-01-10 PROCEDURE — 1123F ACP DISCUSS/DSCN MKR DOCD: CPT | Performed by: PHYSICIAN ASSISTANT

## 2018-01-10 PROCEDURE — 1090F PRES/ABSN URINE INCON ASSESS: CPT | Performed by: PHYSICIAN ASSISTANT

## 2018-01-10 PROCEDURE — G8427 DOCREV CUR MEDS BY ELIG CLIN: HCPCS | Performed by: PHYSICIAN ASSISTANT

## 2018-01-10 PROCEDURE — G8421 BMI NOT CALCULATED: HCPCS | Performed by: PHYSICIAN ASSISTANT

## 2018-01-10 PROCEDURE — 99213 OFFICE O/P EST LOW 20 MIN: CPT | Performed by: PHYSICIAN ASSISTANT

## 2018-01-10 PROCEDURE — 3014F SCREEN MAMMO DOC REV: CPT | Performed by: PHYSICIAN ASSISTANT

## 2018-01-10 PROCEDURE — 3017F COLORECTAL CA SCREEN DOC REV: CPT | Performed by: PHYSICIAN ASSISTANT

## 2018-01-10 PROCEDURE — G8598 ASA/ANTIPLAT THER USED: HCPCS | Performed by: PHYSICIAN ASSISTANT

## 2018-01-10 PROCEDURE — 1036F TOBACCO NON-USER: CPT | Performed by: PHYSICIAN ASSISTANT

## 2018-01-10 PROCEDURE — 4040F PNEUMOC VAC/ADMIN/RCVD: CPT | Performed by: PHYSICIAN ASSISTANT

## 2018-01-10 NOTE — PROGRESS NOTES
Patient Care Team:  Britney Alves as PCP - Rivera Mora MD as Consulting Physician (Cardiology)  Matti Luu MD (Family Medicine)      History and Physical:    Ms. Ana Bañuelos presents for evaluation of carotid artery stenosis and possible TIA's. Current medications include statin, asa and Plavix daily. She denies a history of CVA. She reports that in April she ate some jalapeno chips which she had not eaten in the past. She reports that her tongue became numb, she had some facial paraesthesias and speech difficulties (speech garbled and she was having a hard time finding her words). She reports that she got nauseated and did not \"feel right'. She denied any AF or lateralizing numbness/tingling or weakness. She presented to the ER but never got to see anyone so she left. She reports that she has had  several of the same symptoms since the initial episode but not quite as severe. She reports that the  last time occurring approximately 1 week ago. She had a CDU on 12/4/17 which showed a < 50% right ICA stenosis and left ICA stenosis in the 50-69% range. She had a MRI which was negative for acute ischemia. She had a CTA neck which showed a left ICA stenosis > than 70%. She saw Dr. Sacha Rapp in November 2017 for a history of CAD with stenting and a CABG. She reported There was mention of scheduling a stress test at that time but the patient wanted to defer at that time. She had a echo and stress echo in March 2016 which were okay. She is on ASA, Plavix and a statin daily. She  comes in today to discuss the results of the CTA neck and possible options with Dr. Сергей Mejía. Vero Carreno is a 74 y.o. female with the following history reviewed and recorded in [facility]: There are no active problems to display for this patient. Current Outpatient Prescriptions   Medication Sig Dispense Refill    HYDROcodone-acetaminophen (NORCO) 7.5-325 MG per tablet Take 1 tablet by mouth every 6 hours as needed for Pain .       aspirin 81 MG tablet Take 81 mg by mouth daily      atenolol (TENORMIN) 50 MG tablet Take 50 mg by mouth daily      atorvastatin (LIPITOR) 40 MG tablet Take 40 mg by mouth daily      clopidogrel (PLAVIX) 75 MG tablet Take 75 mg by mouth daily      hydrochlorothiazide (HYDRODIURIL) 25 MG tablet Take 25 mg by mouth daily      lisinopril (PRINIVIL;ZESTRIL) 20 MG tablet Take 20 mg by mouth daily      diphenhydrAMINE HCl, Sleep, (SLEEP AID) 50 MG CAPS Take by mouth      metFORMIN (GLUCOPHAGE) 500 MG tablet Take 500 mg by mouth 2 times daily (with meals)      glucose blood VI test strips (ASCENSIA AUTODISC VI;ONE TOUCH ULTRA TEST VI) strip 1 each by In Vitro route daily As needed.  pantoprazole (PROTONIX) 40 MG tablet Take 40 mg by mouth daily      tiZANidine (ZANAFLEX) 4 MG tablet Take 4 mg by mouth every 6 hours as needed      venlafaxine (EFFEXOR) 50 MG tablet Take 50 mg by mouth 3 times daily      venlafaxine 75 MG extended release tablet Take 75 mg by mouth daily (with breakfast)       No current facility-administered medications for this visit.       Allergies: Chantix [varenicline] and Demerol hcl [meperidine]  Past Medical History:   Diagnosis Date    Acid reflux     Anxiety     [facility]     CHF [facility]congestive heart failure) [facility]     Hyperlipidemia     Hypertension     Osteoarthritis     rheumatoid    PVD [facility]peripheral vascular disease) ([facility]     Raynaud disease     Stomach ulcer     Type 2 diabetes mellitus without complication ([facility])      Past Surgical History:   Procedure Laterality Date    APPENDECTOMY      CARDIAC SURGERY      x2    CORONARY ANGIOPLASTY WITH STENT PLACEMENT      groin    INCISIONAL HERNIA REPAIR      TONSILLECTOMY AND ADENOIDECTOMY       Family History   Problem Relation Age of Onset    Heart Attack Mother     Stroke Mother     Other Mother      aneurysm    Bleeding Prob Sister      blood clot     Social History   Substance Use Topics    Smoking status: Former Smoker     Quit date: 12/26/2011    Smokeless tobacco: Never Used    Alcohol use No       Review of Systems    Constitutional  no significant activity change, appetite change, or unexpected weight change. No fever or chills. No diaphoresis or significant fatigue. HENT  no significant rhinorrhea or epistaxis. No tinnitus or significant hearing loss. Eyes  no sudden vision change or amaurosis. Respiratory  no significant shortness of breath, wheezing, or stridor. No apnea, cough, or chest tightness associated with shortness of breath. Cardiovascular  no chest pain, syncope, or significant dizziness. No palpitations or significant leg swelling. No claudication. Gastrointestinal  no abdominal swelling or pain. No blood in stool. No severe constipation, diarrhea, nausea, or vomiting. Genitourinary  No difficulty urinating, dysuria, frequency, or urgency. No flank pain or hematuria. Musculoskeletal  no back pain, gait disturbance, or myalgia. Skin  no color change, rash, pallor, or new wound. Neurologic  no dizziness, facial asymmetry, or light headedness. No seizures. No, AF,  lateralizing weakness. ( see HPI)  Hematologic  no easy bruising or excessive bleeding. Psychiatric  no severe anxiety or nervousness. No confusion. All other review of systems are negative. Physical Exam    /72 (Site: Left Arm, Position: Sitting, Cuff Size: Medium Adult)   Pulse 53   Temp 97.2 °F (36.2 °C) (Temporal)   Resp 18   SpO2 94%     Constitutional  well developed, well nourished. No diaphoresis or acute distress. HENT  head normocephalic. Right external ear canal appears normal.  Left external ear canal appears normal.  Septum appears midline. Eyes  conjunctiva normal.  EOMS normal.  No exudate. No icterus. Neck- ROM appears normal, no tracheal deviation. Cardiovascular  Regular rate and rhythm.   Heart sounds are normal.  No murmur, rub, or gallop. Carotid pulses are 2+ to palpation bilaterally without bruit. Extremities - Radial and ulnar pulses are 2+ to palpation bilaterally. No cyanosis, clubbing, or significant edema. No signs atheroembolic event. Pulmonary  effort appears normal.  No respiratory distress. Lungs - Breath sounds normal. No wheezes or rales. GI - Abdomen  soft, non tender, bowel sounds X 4 quadrants. No guarding or rebound tenderness. No distension or palpable mass. Genitourinary  deferred. Musculoskeletal  ROM appears normal.  No significant edema. Neurologic  alert and oriented X 3. Physiologic. Tongue midline. Face symmetric. No lateralizing weakness noted. No neurologic deficits noted today. Skin  warm, dry, and intact. No rash, erythema, or pallor. Psychiatric  mood, affect, and behavior appear normal.  Judgment and thought processes appear normal.    Risk factors for atherosclerosis of all vascular beds have been reviewed with the patient including:  Family history, tobacco abuse in all forms, elevated cholesterol, hyperlipidemia, and diabetes. CTA neck - (reviewed by Dr. Heather Gonzalez)  Atheromatous changes of the common and internal carotid   arteries bilaterally. A moderate, 50-70%, stenosis of the distal left common carotid artery. A less than 50% focal stenosis of the left carotid bulb. No   significant stenosis of the internal carotid arteries. The   intracranial circulation is normal.       Dr. Heather Gonzalez saw and evaluated the patient       Options have been discussed by Dr. Heather Gonzalez with the patient including continued medical management vs. proceeding with left CEA to reduce the risk of stroke. He explained to her that all her symptoms did not sound classic in relation to her carotid artery stenosis and that there may be other underlying etiology related to her symptoms. Patient verbalized understanding and has opted to proceed with a left CEA to reduce the risk of stroke.   Risks have been

## 2018-01-10 NOTE — TELEPHONE ENCOUNTER
[facility] VASCULAR  IS REQUESTING CLEARANCE FOR LEFT CAROTID WITH DR COBB. THIS IS SCHEDULED FOR 01/22/17.     PT HAS CAD & WAS LAST STENTED 2016 HAS 4-5 STENT (DONE IN [place]).  2 BYPASSES 1996 (DONE IN [place]).      ON ASA AND PLAVIX  - LOV WITH YOU WAS 11/28/2017     PLEASE ADVISE

## 2018-01-10 NOTE — PROGRESS NOTES
I discussed the case at length with Dr. Sven Valadez at [phone].  Patient Is Apparently Scheduled for Left Carotid Surgery January 22.  I Did Discuss the Findings on the MRA Intracranially and He Understands That.  We Will Be Following up after Surgery.

## 2018-01-11 ENCOUNTER — HOSPITAL ENCOUNTER (OUTPATIENT)
Dept: PREADMISSION TESTING | Age: 68
Discharge: HOME | End: 2018-01-11
Payer: MEDICARE

## 2018-01-11 ENCOUNTER — TELEPHONE (OUTPATIENT)
Dept: GASTROENTEROLOGY | Facility: CLINIC | Age: 68
End: 2018-01-11

## 2018-01-11 ENCOUNTER — HOSPITAL ENCOUNTER (OUTPATIENT)
Dept: GENERAL RADIOLOGY | Age: 68
Discharge: HOME | End: 2018-01-11
Payer: MEDICARE

## 2018-01-11 VITALS — BODY MASS INDEX: 29.57 KG/M2 | HEIGHT: 66 IN | WEIGHT: 184 LBS

## 2018-01-11 DIAGNOSIS — I25.10 CORONARY ARTERY DISEASE DUE TO CALCIFIED CORONARY LESION: Primary | ICD-10-CM

## 2018-01-11 DIAGNOSIS — I25.84 CORONARY ARTERY DISEASE DUE TO CALCIFIED CORONARY LESION: Primary | ICD-10-CM

## 2018-01-11 LAB
ANION GAP SERPL CALCULATED.3IONS-SCNC: 12 MMOL/L (ref 7–19)
APTT: 25.1 SEC (ref 26–36.2)
BASOPHILS ABSOLUTE: 0.1 K/UL (ref 0–0.2)
BASOPHILS RELATIVE PERCENT: 0.9 % (ref 0–1)
BUN BLDV-MCNC: 13 MG/DL (ref 8–23)
CALCIUM SERPL-MCNC: 9.1 MG/DL (ref 8.8–10.2)
CHLORIDE BLD-SCNC: 98 MMOL/L (ref 98–111)
CO2: 29 MMOL/L (ref 22–29)
CREAT SERPL-MCNC: 0.7 MG/DL (ref 0.5–0.9)
EKG P AXIS: 37 DEGREES
EKG P-R INTERVAL: 178 MS
EKG Q-T INTERVAL: 454 MS
EKG QRS DURATION: 78 MS
EKG QTC CALCULATION (BAZETT): 437 MS
EKG T AXIS: 37 DEGREES
EOSINOPHILS ABSOLUTE: 0.3 K/UL (ref 0–0.6)
EOSINOPHILS RELATIVE PERCENT: 3.5 % (ref 0–5)
GFR NON-AFRICAN AMERICAN: >60
GLUCOSE BLD-MCNC: 201 MG/DL (ref 74–109)
HCT VFR BLD CALC: 40.2 % (ref 37–47)
HEMOGLOBIN: 13.3 G/DL (ref 12–16)
INR BLD: 0.99 (ref 0.88–1.18)
LYMPHOCYTES ABSOLUTE: 2.2 K/UL (ref 1.1–4.5)
LYMPHOCYTES RELATIVE PERCENT: 28.9 % (ref 20–40)
MCH RBC QN AUTO: 30.9 PG (ref 27–31)
MCHC RBC AUTO-ENTMCNC: 33.1 G/DL (ref 33–37)
MCV RBC AUTO: 93.5 FL (ref 81–99)
MONOCYTES ABSOLUTE: 0.5 K/UL (ref 0–0.9)
MONOCYTES RELATIVE PERCENT: 6.1 % (ref 0–10)
NEUTROPHILS ABSOLUTE: 4.6 K/UL (ref 1.5–7.5)
NEUTROPHILS RELATIVE PERCENT: 60.2 % (ref 50–65)
PDW BLD-RTO: 12.1 % (ref 11.5–14.5)
PLATELET # BLD: 319 K/UL (ref 130–400)
PMV BLD AUTO: 11.5 FL (ref 9.4–12.3)
POTASSIUM SERPL-SCNC: 4.3 MMOL/L (ref 3.5–5)
PROTHROMBIN TIME: 13 SEC (ref 12–14.6)
RBC # BLD: 4.3 M/UL (ref 4.2–5.4)
SODIUM BLD-SCNC: 139 MMOL/L (ref 136–145)
WBC # BLD: 7.7 K/UL (ref 4.8–10.8)

## 2018-01-11 PROCEDURE — 85730 THROMBOPLASTIN TIME PARTIAL: CPT

## 2018-01-11 PROCEDURE — 71046 X-RAY EXAM CHEST 2 VIEWS: CPT

## 2018-01-11 PROCEDURE — 85025 COMPLETE CBC W/AUTO DIFF WBC: CPT

## 2018-01-11 PROCEDURE — 80048 BASIC METABOLIC PNL TOTAL CA: CPT

## 2018-01-11 PROCEDURE — 85610 PROTHROMBIN TIME: CPT

## 2018-01-11 PROCEDURE — 93005 ELECTROCARDIOGRAM TRACING: CPT

## 2018-01-11 PROCEDURE — 87070 CULTURE OTHR SPECIMN AEROBIC: CPT

## 2018-01-11 RX ORDER — ALBUTEROL SULFATE 90 UG/1
2 AEROSOL, METERED RESPIRATORY (INHALATION)
COMMUNITY
Start: 2017-12-28 | End: 2018-05-10

## 2018-01-11 NOTE — TELEPHONE ENCOUNTER
PT CALLED TO CANCEL ENDOSCOPY DUE TO HAVING SURGERY SHE WILL CALL BACK TO RESCHEDULE WHEN SHE GETS CLEARED

## 2018-01-12 LAB — MRSA CULTURE ONLY: NORMAL

## 2018-01-17 ENCOUNTER — TELEPHONE (OUTPATIENT)
Dept: VASCULAR SURGERY | Age: 68
End: 2018-01-17

## 2018-01-17 ENCOUNTER — HOSPITAL ENCOUNTER (OUTPATIENT)
Dept: CARDIOLOGY | Facility: HOSPITAL | Age: 68
Discharge: HOME | End: 2018-01-17
Attending: INTERNAL MEDICINE | Admitting: INTERNAL MEDICINE

## 2018-01-17 DIAGNOSIS — I25.10 CORONARY ARTERY DISEASE DUE TO CALCIFIED CORONARY LESION: ICD-10-CM

## 2018-01-17 DIAGNOSIS — I25.84 CORONARY ARTERY DISEASE DUE TO CALCIFIED CORONARY LESION: ICD-10-CM

## 2018-01-17 LAB
BH CV ECHO MEAS - BSA(HAYCOCK): 2 M^2
BH CV ECHO MEAS - BSA: 1.9 M^2
BH CV ECHO MEAS - BZI_BMI: 29.9 KILOGRAMS/M^2
BH CV ECHO MEAS - BZI_METRIC_HEIGHT: 167.6 CM
BH CV ECHO MEAS - BZI_METRIC_WEIGHT: 83.9 KG
BH CV STRESS BP STAGE 1: NORMAL
BH CV STRESS BP STAGE 2: NORMAL
BH CV STRESS BP STAGE 3: NORMAL
BH CV STRESS DOSE DOBUTAMINE STAGE 1: 10
BH CV STRESS DOSE DOBUTAMINE STAGE 2: 20
BH CV STRESS DOSE DOBUTAMINE STAGE 3: 30
BH CV STRESS DURATION MIN STAGE 1: 3
BH CV STRESS DURATION MIN STAGE 2: 3
BH CV STRESS DURATION MIN STAGE 3: 0
BH CV STRESS DURATION SEC STAGE 1: 0
BH CV STRESS DURATION SEC STAGE 2: 0
BH CV STRESS DURATION SEC STAGE 3: 29
BH CV STRESS ECHO POST STRESS EJECTION FRACTION EF: 65 %
BH CV STRESS HR STAGE 1: 101
BH CV STRESS HR STAGE 2: 122
BH CV STRESS HR STAGE 3: 135
BH CV STRESS PROTOCOL 1: NORMAL
BH CV STRESS RECOVERY BP: NORMAL MMHG
BH CV STRESS RECOVERY HR: 89 BPM
BH CV STRESS STAGE 1: 1
BH CV STRESS STAGE 2: 2
BH CV STRESS STAGE 3: 3
LV EF 2D ECHO EST: 60 %
MAXIMAL PREDICTED HEART RATE: 155 BPM
PERCENT MAX PREDICTED HR: 87.1 %
STRESS BASELINE BP: NORMAL MMHG
STRESS BASELINE HR: 68 BPM
STRESS PERCENT HR: 102 %
STRESS POST EXERCISE DUR MIN: 6 MIN
STRESS POST EXERCISE DUR SEC: 29 SEC
STRESS POST PEAK BP: NORMAL MMHG
STRESS POST PEAK HR: 135 BPM
STRESS TARGET HR: 132 BPM

## 2018-01-17 PROCEDURE — 93350 STRESS TTE ONLY: CPT

## 2018-01-17 PROCEDURE — 93017 CV STRESS TEST TRACING ONLY: CPT

## 2018-01-17 PROCEDURE — 93350 STRESS TTE ONLY: CPT | Performed by: INTERNAL MEDICINE

## 2018-01-17 PROCEDURE — 93352 ADMIN ECG CONTRAST AGENT: CPT | Performed by: INTERNAL MEDICINE

## 2018-01-17 PROCEDURE — 25010000002 PERFLUTREN 6.52 MG/ML SUSPENSION: Performed by: INTERNAL MEDICINE

## 2018-01-17 PROCEDURE — 93018 CV STRESS TEST I&R ONLY: CPT | Performed by: INTERNAL MEDICINE

## 2018-01-17 PROCEDURE — 25010000003 DOBUTAMINE PER 250 MG: Performed by: INTERNAL MEDICINE

## 2018-01-17 RX ORDER — DOBUTAMINE HYDROCHLORIDE 100 MG/100ML
10 INJECTION INTRAVENOUS
Status: DISCONTINUED | OUTPATIENT
Start: 2018-01-17 | End: 2018-01-18 | Stop reason: HOSPADM

## 2018-01-17 RX ADMIN — PERFLUTREN 8.48 MG: 6.52 INJECTION, SUSPENSION INTRAVENOUS at 09:11

## 2018-01-17 RX ADMIN — Medication 10 MCG/KG/MIN: at 09:24

## 2018-01-19 ENCOUNTER — PREP FOR PROCEDURE (OUTPATIENT)
Dept: VASCULAR SURGERY | Age: 68
End: 2018-01-19

## 2018-01-19 ENCOUNTER — TELEPHONE (OUTPATIENT)
Dept: CARDIOLOGY | Facility: CLINIC | Age: 68
End: 2018-01-19

## 2018-01-19 ENCOUNTER — TELEPHONE (OUTPATIENT)
Dept: VASCULAR SURGERY | Age: 68
End: 2018-01-19

## 2018-01-19 RX ORDER — SODIUM CHLORIDE 0.9 % (FLUSH) 0.9 %
10 SYRINGE (ML) INJECTION EVERY 12 HOURS SCHEDULED
Status: CANCELLED | OUTPATIENT
Start: 2018-01-19

## 2018-01-19 RX ORDER — ASPIRIN 81 MG/1
81 TABLET ORAL ONCE
Status: CANCELLED | OUTPATIENT
Start: 2018-01-19 | End: 2018-01-19

## 2018-01-19 RX ORDER — SODIUM CHLORIDE 0.9 % (FLUSH) 0.9 %
10 SYRINGE (ML) INJECTION PRN
Status: CANCELLED | OUTPATIENT
Start: 2018-01-19

## 2018-01-19 NOTE — TELEPHONE ENCOUNTER
I sw the pt to inform her our office did receive the cardiac clearance letter from Dr. Guanako Pryor office. The pt will be able to proceed with surgery as scheduled for Monday. The pt was instructed to hold her Plavix for 3 days, but to continue her ASA. Pt was also informed to hold her diabetic medication and Lisinopril the morning of the surgery, take all other medications as normally scheduled with a small sip of water. Pt is aware.

## 2018-01-19 NOTE — TELEPHONE ENCOUNTER
Acceptable cardiovascular risk of surgery.  Can proceed with surgery with usual caution and perioperative hemodynamic and cardiac rhythm monitoring.

## 2018-01-19 NOTE — TELEPHONE ENCOUNTER
PER DR PERRY -  Acceptable cardiovascular risk of surgery. Can proceed with surgery with usual caution and perioperative hemodynamic and cardiac rhythm monitoring     SPOKE WITH DR PERRY REGARDING PLAVIX & ASA; HOLD PLAVIX X 5 DAYS PRIOR - DO NOT STOP ASA     IF DR COBB IS OKAY TO OPERATE ON PLAVIX & ASA - HE MAY PROCEED, OTHERWISE, SURGERY WILL NEED TO BE RESCHEDULE TO ALLOW A/C HOLD TIME.     YENI @ [facility] VASCULAR NOTIFIED.

## 2018-01-19 NOTE — H&P
gallop. Carotid pulses are 2+ to palpation bilaterally without bruit. Extremities - Radial and ulnar pulses are 2+ to palpation bilaterally. No cyanosis, clubbing, or significant edema. No signs atheroembolic event. Pulmonary - effort appears normal.  No respiratory distress. Lungs - Breath sounds normal. No wheezes or rales. GI - Abdomen - soft, non tender, bowel sounds X 4 quadrants. No guarding or rebound tenderness. No distension or palpable mass. Genitourinary - deferred. Musculoskeletal - ROM appears normal.  No significant edema. Neurologic - alert and oriented X 3. Physiologic. Tongue midline. Face symmetric. No lateralizing weakness noted. No neurologic deficits noted today. Skin - warm, dry, and intact. No rash, erythema, or pallor. Psychiatric - mood, affect, and behavior appear normal.  Judgment and thought processes appear normal.    Risk factors for atherosclerosis of all vascular beds have been reviewed with the patient including:  Family history, tobacco abuse in all forms, elevated cholesterol, hyperlipidemia, and diabetes. CTA neck - (reviewed by Dr. Valery Farias)  Atheromatous changes of the common and internal carotid   arteries bilaterally. A moderate, 50-70%, stenosis of the distal left common carotid artery. A less than 50% focal stenosis of the left carotid bulb. No   significant stenosis of the internal carotid arteries. The   intracranial circulation is normal.       Dr. Valery Farias saw and evaluated the patient       Options have been discussed by Dr. Valery Farias with the patient including continued medical management vs. proceeding with left CEA to reduce the risk of stroke. He explained to her that all her symptoms did not sound classic in relation to her carotid artery stenosis and that there may be other underlying etiology related to her symptoms. Patient verbalized understanding and has opted to proceed with a left CEA to reduce the risk of stroke.   Risks have been discussed with the patient including but not limited to MI, death, CVA, bleed, nerve injury, and infection. Assessment    1. Bilateral carotid artery stenosis    2.  Carotid artery stenosis, symptomatic, left          Plan    Recommend continue ASA EC 81 mg daily  Recommend continue Plavix 75 mg daily (Hold 3 days for surgery)  Strongly encourage continue  statin therapy  Recommend no smoking  We will notify Dr. Goble Schlatter office for plans for proceeding with left CEA and assess for cardiac clearance  We will obtain pre op testing     Proceed with left carotid endarterectomy    Addendum:  Stress Echo done on 1/17/18 at [facility]  We received Cardiac clearance by Dr. Goble Schlatter that she is an acceptable candidate to proceed with left CEA

## 2018-01-22 ENCOUNTER — ANESTHESIA EVENT (OUTPATIENT)
Dept: OPERATING ROOM | Age: 68
DRG: 039 | End: 2018-01-22
Payer: MEDICARE

## 2018-01-22 ENCOUNTER — HOSPITAL ENCOUNTER (INPATIENT)
Age: 68
LOS: 1 days | Discharge: HOME | DRG: 039 | End: 2018-01-23
Attending: SURGERY | Admitting: SURGERY
Payer: MEDICARE

## 2018-01-22 ENCOUNTER — ANESTHESIA (OUTPATIENT)
Dept: OPERATING ROOM | Age: 68
DRG: 039 | End: 2018-01-22
Payer: MEDICARE

## 2018-01-22 VITALS
SYSTOLIC BLOOD PRESSURE: 154 MMHG | OXYGEN SATURATION: 96 % | RESPIRATION RATE: 24 BRPM | DIASTOLIC BLOOD PRESSURE: 73 MMHG | TEMPERATURE: 97.8 F

## 2018-01-22 PROBLEM — I65.22 CAROTID STENOSIS, LEFT: Status: ACTIVE | Noted: 2018-01-22

## 2018-01-22 PROBLEM — I65.22 LEFT CAROTID STENOSIS: Status: ACTIVE | Noted: 2018-01-22

## 2018-01-22 LAB
ABO/RH: NORMAL
ANTIBODY SCREEN: NORMAL
GLUCOSE BLD-MCNC: 190 MG/DL (ref 70–99)
GLUCOSE BLD-MCNC: 234 MG/DL (ref 70–99)
GLUCOSE BLD-MCNC: 243 MG/DL (ref 70–99)
GLUCOSE BLD-MCNC: 333 MG/DL (ref 70–99)
PERFORMED ON: ABNORMAL

## 2018-01-22 PROCEDURE — 6370000000 HC RX 637 (ALT 250 FOR IP): Performed by: ANESTHESIOLOGY

## 2018-01-22 PROCEDURE — 82948 REAGENT STRIP/BLOOD GLUCOSE: CPT

## 2018-01-22 PROCEDURE — 36415 COLL VENOUS BLD VENIPUNCTURE: CPT

## 2018-01-22 PROCEDURE — 6370000000 HC RX 637 (ALT 250 FOR IP): Performed by: SURGERY

## 2018-01-22 PROCEDURE — 1210000000 HC MED SURG R&B

## 2018-01-22 PROCEDURE — 86901 BLOOD TYPING SEROLOGIC RH(D): CPT

## 2018-01-22 PROCEDURE — 6360000002 HC RX W HCPCS: Performed by: ANESTHESIOLOGY

## 2018-01-22 PROCEDURE — 3600000005 HC SURGERY LEVEL 5 BASE: Performed by: SURGERY

## 2018-01-22 PROCEDURE — 86850 RBC ANTIBODY SCREEN: CPT

## 2018-01-22 PROCEDURE — 86900 BLOOD TYPING SEROLOGIC ABO: CPT

## 2018-01-22 PROCEDURE — 2580000003 HC RX 258: Performed by: SURGERY

## 2018-01-22 PROCEDURE — 94664 DEMO&/EVAL PT USE INHALER: CPT

## 2018-01-22 PROCEDURE — 3700000000 HC ANESTHESIA ATTENDED CARE: Performed by: SURGERY

## 2018-01-22 PROCEDURE — 2720000001 HC MISC SURG SUPPLY STERILE $51-500: Performed by: SURGERY

## 2018-01-22 PROCEDURE — 35301 RECHANNELING OF ARTERY: CPT | Performed by: SURGERY

## 2018-01-22 PROCEDURE — 6360000002 HC RX W HCPCS: Performed by: SURGERY

## 2018-01-22 PROCEDURE — 6360000002 HC RX W HCPCS

## 2018-01-22 PROCEDURE — 03CL0ZZ EXTIRPATION OF MATTER FROM LEFT INTERNAL CAROTID ARTERY, OPEN APPROACH: ICD-10-PCS | Performed by: SURGERY

## 2018-01-22 PROCEDURE — 2780000001 HC MISC IMPLANT NES: Performed by: SURGERY

## 2018-01-22 PROCEDURE — 6360000002 HC RX W HCPCS: Performed by: NURSE ANESTHETIST, CERTIFIED REGISTERED

## 2018-01-22 PROCEDURE — 7100000001 HC PACU RECOVERY - ADDTL 15 MIN: Performed by: SURGERY

## 2018-01-22 PROCEDURE — 2580000003 HC RX 258: Performed by: NURSE ANESTHETIST, CERTIFIED REGISTERED

## 2018-01-22 PROCEDURE — 2500000003 HC RX 250 WO HCPCS: Performed by: NURSE ANESTHETIST, CERTIFIED REGISTERED

## 2018-01-22 PROCEDURE — C1781 MESH (IMPLANTABLE): HCPCS | Performed by: SURGERY

## 2018-01-22 PROCEDURE — 03UL0KZ SUPPLEMENT LEFT INTERNAL CAROTID ARTERY WITH NONAUTOLOGOUS TISSUE SUBSTITUTE, OPEN APPROACH: ICD-10-PCS | Performed by: SURGERY

## 2018-01-22 PROCEDURE — 3600000015 HC SURGERY LEVEL 5 ADDTL 15MIN: Performed by: SURGERY

## 2018-01-22 PROCEDURE — A4364 ADHESIVE, LIQUID OR EQUAL: HCPCS | Performed by: SURGERY

## 2018-01-22 PROCEDURE — 3700000001 HC ADD 15 MINUTES (ANESTHESIA): Performed by: SURGERY

## 2018-01-22 PROCEDURE — 7100000000 HC PACU RECOVERY - FIRST 15 MIN: Performed by: SURGERY

## 2018-01-22 PROCEDURE — L8612 AQUEOUS SHUNT PROSTHESIS: HCPCS | Performed by: SURGERY

## 2018-01-22 PROCEDURE — 6360000002 HC RX W HCPCS: Performed by: PHYSICIAN ASSISTANT

## 2018-01-22 DEVICE — XENOSURE BIOLOGIC PATCH, 1CM X 6CM
Type: IMPLANTABLE DEVICE | Status: FUNCTIONAL
Brand: XENOSURE BIOLOGIC PATCH

## 2018-01-22 RX ORDER — LIDOCAINE HYDROCHLORIDE 10 MG/ML
1 INJECTION, SOLUTION EPIDURAL; INFILTRATION; INTRACAUDAL; PERINEURAL
Status: DISCONTINUED | OUTPATIENT
Start: 2018-01-22 | End: 2018-01-22 | Stop reason: HOSPADM

## 2018-01-22 RX ORDER — PROPOFOL 10 MG/ML
INJECTION, EMULSION INTRAVENOUS PRN
Status: DISCONTINUED | OUTPATIENT
Start: 2018-01-22 | End: 2018-01-22 | Stop reason: SDUPTHER

## 2018-01-22 RX ORDER — ASPIRIN 81 MG/1
81 TABLET ORAL ONCE
Status: DISCONTINUED | OUTPATIENT
Start: 2018-01-22 | End: 2018-01-22 | Stop reason: HOSPADM

## 2018-01-22 RX ORDER — NITROGLYCERIN 20 MG/100ML
INJECTION INTRAVENOUS PRN
Status: DISCONTINUED | OUTPATIENT
Start: 2018-01-22 | End: 2018-01-22 | Stop reason: SDUPTHER

## 2018-01-22 RX ORDER — SODIUM CHLORIDE 9 MG/ML
INJECTION, SOLUTION INTRAVENOUS CONTINUOUS
Status: DISCONTINUED | OUTPATIENT
Start: 2018-01-22 | End: 2018-01-22

## 2018-01-22 RX ORDER — HEPARIN SODIUM 1000 [USP'U]/ML
INJECTION, SOLUTION INTRAVENOUS; SUBCUTANEOUS PRN
Status: DISCONTINUED | OUTPATIENT
Start: 2018-01-22 | End: 2018-01-22 | Stop reason: SDUPTHER

## 2018-01-22 RX ORDER — HYDROCODONE BITARTRATE AND ACETAMINOPHEN 5; 325 MG/1; MG/1
2 TABLET ORAL EVERY 4 HOURS PRN
Status: DISCONTINUED | OUTPATIENT
Start: 2018-01-22 | End: 2018-01-23 | Stop reason: HOSPADM

## 2018-01-22 RX ORDER — NICOTINE POLACRILEX 4 MG
15 LOZENGE BUCCAL PRN
Status: DISCONTINUED | OUTPATIENT
Start: 2018-01-22 | End: 2018-01-23 | Stop reason: HOSPADM

## 2018-01-22 RX ORDER — DEXAMETHASONE SODIUM PHOSPHATE 10 MG/ML
INJECTION INTRAMUSCULAR; INTRAVENOUS PRN
Status: DISCONTINUED | OUTPATIENT
Start: 2018-01-22 | End: 2018-01-22 | Stop reason: SDUPTHER

## 2018-01-22 RX ORDER — ATENOLOL 50 MG/1
50 TABLET ORAL ONCE
Status: COMPLETED | OUTPATIENT
Start: 2018-01-22 | End: 2018-01-22

## 2018-01-22 RX ORDER — FENTANYL CITRATE 50 UG/ML
25 INJECTION, SOLUTION INTRAMUSCULAR; INTRAVENOUS
Status: DISCONTINUED | OUTPATIENT
Start: 2018-01-22 | End: 2018-01-22 | Stop reason: HOSPADM

## 2018-01-22 RX ORDER — MIDAZOLAM HYDROCHLORIDE 1 MG/ML
2 INJECTION INTRAMUSCULAR; INTRAVENOUS
Status: COMPLETED | OUTPATIENT
Start: 2018-01-22 | End: 2018-01-22

## 2018-01-22 RX ORDER — HYDRALAZINE HYDROCHLORIDE 20 MG/ML
5 INJECTION INTRAMUSCULAR; INTRAVENOUS EVERY 10 MIN PRN
Status: DISCONTINUED | OUTPATIENT
Start: 2018-01-22 | End: 2018-01-22 | Stop reason: HOSPADM

## 2018-01-22 RX ORDER — HYDRALAZINE HYDROCHLORIDE 20 MG/ML
10 INJECTION INTRAMUSCULAR; INTRAVENOUS
Status: DISCONTINUED | OUTPATIENT
Start: 2018-01-22 | End: 2018-01-23 | Stop reason: HOSPADM

## 2018-01-22 RX ORDER — HYDROMORPHONE HCL 110MG/55ML
0.5 PATIENT CONTROLLED ANALGESIA SYRINGE INTRAVENOUS EVERY 5 MIN PRN
Status: COMPLETED | OUTPATIENT
Start: 2018-01-22 | End: 2018-01-22

## 2018-01-22 RX ORDER — SODIUM CHLORIDE, SODIUM LACTATE, POTASSIUM CHLORIDE, CALCIUM CHLORIDE 600; 310; 30; 20 MG/100ML; MG/100ML; MG/100ML; MG/100ML
INJECTION, SOLUTION INTRAVENOUS CONTINUOUS
Status: DISCONTINUED | OUTPATIENT
Start: 2018-01-22 | End: 2018-01-22

## 2018-01-22 RX ORDER — SODIUM CHLORIDE 0.9 % (FLUSH) 0.9 %
10 SYRINGE (ML) INJECTION EVERY 12 HOURS SCHEDULED
Status: DISCONTINUED | OUTPATIENT
Start: 2018-01-22 | End: 2018-01-22 | Stop reason: HOSPADM

## 2018-01-22 RX ORDER — MIDAZOLAM HYDROCHLORIDE 1 MG/ML
INJECTION INTRAMUSCULAR; INTRAVENOUS
Status: COMPLETED
Start: 2018-01-22 | End: 2018-01-22

## 2018-01-22 RX ORDER — ACETAMINOPHEN 325 MG/1
650 TABLET ORAL EVERY 4 HOURS PRN
Status: DISCONTINUED | OUTPATIENT
Start: 2018-01-22 | End: 2018-01-23 | Stop reason: HOSPADM

## 2018-01-22 RX ORDER — SODIUM CHLORIDE, SODIUM LACTATE, POTASSIUM CHLORIDE, CALCIUM CHLORIDE 600; 310; 30; 20 MG/100ML; MG/100ML; MG/100ML; MG/100ML
INJECTION, SOLUTION INTRAVENOUS CONTINUOUS PRN
Status: DISCONTINUED | OUTPATIENT
Start: 2018-01-22 | End: 2018-01-22 | Stop reason: SDUPTHER

## 2018-01-22 RX ORDER — METOCLOPRAMIDE HYDROCHLORIDE 5 MG/ML
10 INJECTION INTRAMUSCULAR; INTRAVENOUS
Status: DISCONTINUED | OUTPATIENT
Start: 2018-01-22 | End: 2018-01-22 | Stop reason: HOSPADM

## 2018-01-22 RX ORDER — PROMETHAZINE HYDROCHLORIDE 25 MG/ML
6.25 INJECTION, SOLUTION INTRAMUSCULAR; INTRAVENOUS
Status: DISCONTINUED | OUTPATIENT
Start: 2018-01-22 | End: 2018-01-22 | Stop reason: HOSPADM

## 2018-01-22 RX ORDER — FENTANYL CITRATE 50 UG/ML
INJECTION, SOLUTION INTRAMUSCULAR; INTRAVENOUS PRN
Status: DISCONTINUED | OUTPATIENT
Start: 2018-01-22 | End: 2018-01-22 | Stop reason: SDUPTHER

## 2018-01-22 RX ORDER — FENTANYL CITRATE 50 UG/ML
50 INJECTION, SOLUTION INTRAMUSCULAR; INTRAVENOUS
Status: DISCONTINUED | OUTPATIENT
Start: 2018-01-22 | End: 2018-01-22 | Stop reason: HOSPADM

## 2018-01-22 RX ORDER — ONDANSETRON 2 MG/ML
INJECTION INTRAMUSCULAR; INTRAVENOUS PRN
Status: DISCONTINUED | OUTPATIENT
Start: 2018-01-22 | End: 2018-01-22 | Stop reason: SDUPTHER

## 2018-01-22 RX ORDER — LABETALOL HYDROCHLORIDE 5 MG/ML
5 INJECTION, SOLUTION INTRAVENOUS EVERY 10 MIN PRN
Status: DISCONTINUED | OUTPATIENT
Start: 2018-01-22 | End: 2018-01-22 | Stop reason: HOSPADM

## 2018-01-22 RX ORDER — MORPHINE SULFATE 4 MG/ML
4 INJECTION, SOLUTION INTRAMUSCULAR; INTRAVENOUS EVERY 5 MIN PRN
Status: DISCONTINUED | OUTPATIENT
Start: 2018-01-22 | End: 2018-01-22 | Stop reason: HOSPADM

## 2018-01-22 RX ORDER — ASPIRIN 81 MG/1
81 TABLET ORAL DAILY
Status: DISCONTINUED | OUTPATIENT
Start: 2018-01-22 | End: 2018-01-23 | Stop reason: HOSPADM

## 2018-01-22 RX ORDER — SODIUM CHLORIDE 0.9 % (FLUSH) 0.9 %
10 SYRINGE (ML) INJECTION PRN
Status: DISCONTINUED | OUTPATIENT
Start: 2018-01-22 | End: 2018-01-22 | Stop reason: HOSPADM

## 2018-01-22 RX ORDER — SODIUM CHLORIDE 0.9 % (FLUSH) 0.9 %
10 SYRINGE (ML) INJECTION PRN
Status: DISCONTINUED | OUTPATIENT
Start: 2018-01-22 | End: 2018-01-23 | Stop reason: HOSPADM

## 2018-01-22 RX ORDER — ONDANSETRON 2 MG/ML
4 INJECTION INTRAMUSCULAR; INTRAVENOUS EVERY 6 HOURS PRN
Status: DISCONTINUED | OUTPATIENT
Start: 2018-01-22 | End: 2018-01-23 | Stop reason: HOSPADM

## 2018-01-22 RX ORDER — DEXTROSE MONOHYDRATE 50 MG/ML
100 INJECTION, SOLUTION INTRAVENOUS PRN
Status: DISCONTINUED | OUTPATIENT
Start: 2018-01-22 | End: 2018-01-23 | Stop reason: HOSPADM

## 2018-01-22 RX ORDER — MORPHINE SULFATE 4 MG/ML
2 INJECTION, SOLUTION INTRAMUSCULAR; INTRAVENOUS EVERY 5 MIN PRN
Status: DISCONTINUED | OUTPATIENT
Start: 2018-01-22 | End: 2018-01-22 | Stop reason: HOSPADM

## 2018-01-22 RX ORDER — SODIUM CHLORIDE 9 MG/ML
INJECTION, SOLUTION INTRAVENOUS CONTINUOUS
Status: DISCONTINUED | OUTPATIENT
Start: 2018-01-22 | End: 2018-01-23 | Stop reason: HOSPADM

## 2018-01-22 RX ORDER — MEPERIDINE HYDROCHLORIDE 50 MG/ML
12.5 INJECTION INTRAMUSCULAR; INTRAVENOUS; SUBCUTANEOUS EVERY 5 MIN PRN
Status: DISCONTINUED | OUTPATIENT
Start: 2018-01-22 | End: 2018-01-22 | Stop reason: HOSPADM

## 2018-01-22 RX ORDER — LIDOCAINE HYDROCHLORIDE 10 MG/ML
INJECTION, SOLUTION INFILTRATION; PERINEURAL PRN
Status: DISCONTINUED | OUTPATIENT
Start: 2018-01-22 | End: 2018-01-22 | Stop reason: SDUPTHER

## 2018-01-22 RX ORDER — ENALAPRILAT 2.5 MG/2ML
1.25 INJECTION INTRAVENOUS
Status: DISCONTINUED | OUTPATIENT
Start: 2018-01-22 | End: 2018-01-22 | Stop reason: HOSPADM

## 2018-01-22 RX ORDER — HYDROCODONE BITARTRATE AND ACETAMINOPHEN 5; 325 MG/1; MG/1
1 TABLET ORAL EVERY 4 HOURS PRN
Status: DISCONTINUED | OUTPATIENT
Start: 2018-01-22 | End: 2018-01-23 | Stop reason: HOSPADM

## 2018-01-22 RX ORDER — DIPHENHYDRAMINE HYDROCHLORIDE 50 MG/ML
12.5 INJECTION INTRAMUSCULAR; INTRAVENOUS
Status: DISCONTINUED | OUTPATIENT
Start: 2018-01-22 | End: 2018-01-22 | Stop reason: HOSPADM

## 2018-01-22 RX ORDER — LIDOCAINE HYDROCHLORIDE 10 MG/ML
1 INJECTION, SOLUTION EPIDURAL; INFILTRATION; INTRACAUDAL; PERINEURAL ONCE
Status: DISCONTINUED | OUTPATIENT
Start: 2018-01-22 | End: 2018-01-22 | Stop reason: HOSPADM

## 2018-01-22 RX ORDER — CLOPIDOGREL BISULFATE 75 MG/1
75 TABLET ORAL DAILY
Status: DISCONTINUED | OUTPATIENT
Start: 2018-01-22 | End: 2018-01-23 | Stop reason: HOSPADM

## 2018-01-22 RX ORDER — SODIUM CHLORIDE 0.9 % (FLUSH) 0.9 %
10 SYRINGE (ML) INJECTION EVERY 12 HOURS SCHEDULED
Status: DISCONTINUED | OUTPATIENT
Start: 2018-01-22 | End: 2018-01-23 | Stop reason: HOSPADM

## 2018-01-22 RX ORDER — HYDROMORPHONE HCL 110MG/55ML
0.25 PATIENT CONTROLLED ANALGESIA SYRINGE INTRAVENOUS EVERY 5 MIN PRN
Status: DISCONTINUED | OUTPATIENT
Start: 2018-01-22 | End: 2018-01-22 | Stop reason: HOSPADM

## 2018-01-22 RX ORDER — DEXTROSE MONOHYDRATE 25 G/50ML
12.5 INJECTION, SOLUTION INTRAVENOUS PRN
Status: DISCONTINUED | OUTPATIENT
Start: 2018-01-22 | End: 2018-01-23 | Stop reason: HOSPADM

## 2018-01-22 RX ADMIN — FENTANYL CITRATE 50 MCG: 50 INJECTION, SOLUTION INTRAMUSCULAR; INTRAVENOUS at 08:09

## 2018-01-22 RX ADMIN — HYDROCODONE BITARTRATE AND ACETAMINOPHEN 1 TABLET: 5; 325 TABLET ORAL at 15:56

## 2018-01-22 RX ADMIN — FENTANYL CITRATE 50 MCG: 50 INJECTION, SOLUTION INTRAMUSCULAR; INTRAVENOUS at 08:19

## 2018-01-22 RX ADMIN — ONDANSETRON HYDROCHLORIDE 4 MG: 2 SOLUTION INTRAMUSCULAR; INTRAVENOUS at 09:06

## 2018-01-22 RX ADMIN — PROPOFOL 150 MG: 10 INJECTION, EMULSION INTRAVENOUS at 07:39

## 2018-01-22 RX ADMIN — HYDROMORPHONE HYDROCHLORIDE 0.5 MG: 2 INJECTION, SOLUTION INTRAMUSCULAR; INTRAVENOUS; SUBCUTANEOUS at 10:20

## 2018-01-22 RX ADMIN — DEXAMETHASONE SODIUM PHOSPHATE 8 MG: 10 INJECTION INTRAMUSCULAR; INTRAVENOUS at 07:50

## 2018-01-22 RX ADMIN — SODIUM CHLORIDE, SODIUM LACTATE, POTASSIUM CHLORIDE, AND CALCIUM CHLORIDE: 600; 310; 30; 20 INJECTION, SOLUTION INTRAVENOUS at 09:08

## 2018-01-22 RX ADMIN — INSULIN LISPRO 8 UNITS: 100 INJECTION, SOLUTION INTRAVENOUS; SUBCUTANEOUS at 17:54

## 2018-01-22 RX ADMIN — ONDANSETRON 4 MG: 2 INJECTION INTRAMUSCULAR; INTRAVENOUS at 12:57

## 2018-01-22 RX ADMIN — CEFAZOLIN SODIUM 2 G: 2 SOLUTION INTRAVENOUS at 07:46

## 2018-01-22 RX ADMIN — ATENOLOL 50 MG: 50 TABLET ORAL at 07:30

## 2018-01-22 RX ADMIN — HYDROMORPHONE HYDROCHLORIDE 0.5 MG: 2 INJECTION, SOLUTION INTRAMUSCULAR; INTRAVENOUS; SUBCUTANEOUS at 10:03

## 2018-01-22 RX ADMIN — SODIUM CHLORIDE, SODIUM LACTATE, POTASSIUM CHLORIDE, AND CALCIUM CHLORIDE: 600; 310; 30; 20 INJECTION, SOLUTION INTRAVENOUS at 07:36

## 2018-01-22 RX ADMIN — FENTANYL CITRATE 100 MCG: 50 INJECTION, SOLUTION INTRAMUSCULAR; INTRAVENOUS at 07:39

## 2018-01-22 RX ADMIN — NITROGLYCERIN 20 MCG: 20 INJECTION INTRAVENOUS at 09:25

## 2018-01-22 RX ADMIN — FENTANYL CITRATE 50 MCG: 50 INJECTION, SOLUTION INTRAMUSCULAR; INTRAVENOUS at 09:11

## 2018-01-22 RX ADMIN — HYDROCODONE BITARTRATE AND ACETAMINOPHEN 1 TABLET: 5; 325 TABLET ORAL at 17:54

## 2018-01-22 RX ADMIN — HYDROMORPHONE HYDROCHLORIDE 0.5 MG: 2 INJECTION, SOLUTION INTRAMUSCULAR; INTRAVENOUS; SUBCUTANEOUS at 09:38

## 2018-01-22 RX ADMIN — LABETALOL HYDROCHLORIDE 5 MG: 5 INJECTION INTRAVENOUS at 09:57

## 2018-01-22 RX ADMIN — LABETALOL HYDROCHLORIDE 5 MG: 5 INJECTION INTRAVENOUS at 10:15

## 2018-01-22 RX ADMIN — NITROGLYCERIN 20 MCG: 20 INJECTION INTRAVENOUS at 09:15

## 2018-01-22 RX ADMIN — ONDANSETRON 4 MG: 2 INJECTION INTRAMUSCULAR; INTRAVENOUS at 23:42

## 2018-01-22 RX ADMIN — LABETALOL HYDROCHLORIDE 5 MG: 5 INJECTION INTRAVENOUS at 09:44

## 2018-01-22 RX ADMIN — HYDROCODONE BITARTRATE AND ACETAMINOPHEN 2 TABLET: 5; 325 TABLET ORAL at 11:59

## 2018-01-22 RX ADMIN — HYDROMORPHONE HYDROCHLORIDE 0.5 MG: 2 INJECTION, SOLUTION INTRAMUSCULAR; INTRAVENOUS; SUBCUTANEOUS at 09:50

## 2018-01-22 RX ADMIN — NITROGLYCERIN 40 MCG: 20 INJECTION INTRAVENOUS at 09:19

## 2018-01-22 RX ADMIN — CLOPIDOGREL BISULFATE 75 MG: 75 TABLET ORAL at 11:59

## 2018-01-22 RX ADMIN — PROPOFOL 50 MG: 10 INJECTION, EMULSION INTRAVENOUS at 07:42

## 2018-01-22 RX ADMIN — MIDAZOLAM 2 MG: 1 INJECTION INTRAMUSCULAR; INTRAVENOUS at 07:20

## 2018-01-22 RX ADMIN — FENTANYL CITRATE 100 MCG: 50 INJECTION, SOLUTION INTRAMUSCULAR; INTRAVENOUS at 08:02

## 2018-01-22 RX ADMIN — NITROGLYCERIN 20 MCG: 20 INJECTION INTRAVENOUS at 09:36

## 2018-01-22 RX ADMIN — CEFAZOLIN SODIUM 2 G: 2 SOLUTION INTRAVENOUS at 23:42

## 2018-01-22 RX ADMIN — HYDROCODONE BITARTRATE AND ACETAMINOPHEN 2 TABLET: 5; 325 TABLET ORAL at 22:12

## 2018-01-22 RX ADMIN — Medication 10 ML: at 22:12

## 2018-01-22 RX ADMIN — CEFAZOLIN SODIUM 2 G: 2 SOLUTION INTRAVENOUS at 16:21

## 2018-01-22 RX ADMIN — FENTANYL CITRATE 50 MCG: 50 INJECTION, SOLUTION INTRAMUSCULAR; INTRAVENOUS at 09:36

## 2018-01-22 RX ADMIN — HEPARIN SODIUM 6000 UNITS: 1000 INJECTION, SOLUTION INTRAVENOUS; SUBCUTANEOUS at 08:12

## 2018-01-22 RX ADMIN — FENTANYL CITRATE 50 MCG: 50 INJECTION, SOLUTION INTRAMUSCULAR; INTRAVENOUS at 07:22

## 2018-01-22 RX ADMIN — LIDOCAINE HYDROCHLORIDE 50 MG: 10 INJECTION, SOLUTION INFILTRATION; PERINEURAL at 07:39

## 2018-01-22 RX ADMIN — MIDAZOLAM HYDROCHLORIDE 2 MG: 1 INJECTION INTRAMUSCULAR; INTRAVENOUS at 07:20

## 2018-01-22 RX ADMIN — SODIUM CHLORIDE: 9 INJECTION, SOLUTION INTRAVENOUS at 12:00

## 2018-01-22 RX ADMIN — INSULIN LISPRO 2 UNITS: 100 INJECTION, SOLUTION INTRAVENOUS; SUBCUTANEOUS at 21:38

## 2018-01-22 RX ADMIN — ASPIRIN 81 MG: 81 TABLET, COATED ORAL at 11:59

## 2018-01-22 ASSESSMENT — LIFESTYLE VARIABLES: SMOKING_STATUS: 0

## 2018-01-22 ASSESSMENT — PAIN SCALES - GENERAL
PAINLEVEL_OUTOF10: 6
PAINLEVEL_OUTOF10: 5
PAINLEVEL_OUTOF10: 7
PAINLEVEL_OUTOF10: 8
PAINLEVEL_OUTOF10: 8
PAINLEVEL_OUTOF10: 6
PAINLEVEL_OUTOF10: 0
PAINLEVEL_OUTOF10: 5
PAINLEVEL_OUTOF10: 6

## 2018-01-22 ASSESSMENT — PAIN - FUNCTIONAL ASSESSMENT: PAIN_FUNCTIONAL_ASSESSMENT: 0-10

## 2018-01-22 ASSESSMENT — ENCOUNTER SYMPTOMS: SHORTNESS OF BREATH: 0

## 2018-01-22 NOTE — PROGRESS NOTES
Post op check    C/o sore throat, otherwise minimal discomfort    Stable VS  Wound ok, no hematoma  nuero intact    Stable post op

## 2018-01-22 NOTE — PROGRESS NOTES
Patient is post op left CEA earlier today per . Vital signs are stable, patient is A&O X3, moving extremities without difficulty and speech is clear. Patient has had pain meds for complaints of neck pain, has had zofran for complaints of nausea, no complaints voiced at present time.

## 2018-01-22 NOTE — PROGRESS NOTES
Patient arrived to floor. Patient is alert and oriented. Neck circumference measured at 42 cm. Patient states her pain is at a 5. Will continue to monitor.

## 2018-01-22 NOTE — ANESTHESIA PRE PROCEDURE
Department of Anesthesiology  Preprocedure Note       Name:  Robert Wang   Age:  74 y.o.  :  1950                                          MRN:  [id number]         Date:  2018      Surgeon: Bridget Brewer):  Lisseth Escalante MD    Procedure: Procedure(s):  CAROTID ENDARTERECTOMY    Medications prior to admission:   Prior to Admission medications    Medication Sig Start Date End Date Taking? Authorizing Provider   HYDROcodone-acetaminophen (NORCO) 7.5-325 MG per tablet Take 1 tablet by mouth every 6 hours as needed for Pain . Yes Historical Provider, MD   albuterol sulfate  (90 Base) MCG/ACT inhaler Inhale 2 puffs into the lungs 17   Historical Provider, MD   DiphenhydrAMINE HCl, Sleep, (SLEEP AID) 25 MG CAPS Take 1 capsule by mouth nightly    Historical Provider, MD   aspirin 81 MG tablet Take 81 mg by mouth daily    Historical Provider, MD   atenolol (TENORMIN) 50 MG tablet Take 50 mg by mouth daily     Historical Provider, MD   atorvastatin (LIPITOR) 40 MG tablet Take 40 mg by mouth nightly     Historical Provider, MD   clopidogrel (PLAVIX) 75 MG tablet Take 75 mg by mouth daily    Historical Provider, MD   hydrochlorothiazide (HYDRODIURIL) 25 MG tablet Take 25 mg by mouth daily     Historical Provider, MD   lisinopril (PRINIVIL;ZESTRIL) 20 MG tablet Take 20 mg by mouth daily     Historical Provider, MD   metFORMIN (GLUCOPHAGE) 500 MG tablet Take 500 mg by mouth daily (with breakfast)     Historical Provider, MD   glucose blood VI test strips (ASCENSIA AUTODISC VI;ONE TOUCH ULTRA TEST VI) strip 1 each by In Vitro route daily As needed.     Historical Provider, MD   pantoprazole (PROTONIX) 40 MG tablet Take 40 mg by mouth daily    Historical Provider, MD   tiZANidine (ZANAFLEX) 4 MG tablet Take 4 mg by mouth every 6 hours as needed    Historical Provider, MD   venlafaxine 75 MG extended release tablet Take 75 mg by mouth daily (with breakfast)     Historical Provider, MD       Current medications: Current Facility-Administered Medications   Medication Dose Route Frequency Provider Last Rate Last Dose    ceFAZolin (ANCEF) 2 g in dextrose 3 % 50 mL IVPB (duplex)  2 g Intravenous On Call to Samantha Feliciano PA-C        sodium chloride flush 0.9 % injection 10 mL  10 mL Intravenous [facility] per day [facility]        sodium chloride flush 0.9 % injection 10 mL  10 mL Intravenous PRN Gloria Fernandez PA-C        aspirin EC tablet 81 mg  81 mg Oral Once [facility]        midazolam (VERSED) 2 MG/2ML injection             lactated ringers infusion   Intravenous Continuous Butch Fong MD        lidocaine PF 1 % injection 1 mL  1 mL Intradermal Once Butch Fong MD           Allergies: Allergies   Allergen Reactions    Chantix [Varenicline] Other (See Comments)     \" made me crazy \"     Demerol Hcl [Meperidine] Nausea And Vomiting       Problem List:  There is no problem list on file for this patient.       Past Medical History:        Diagnosis Date    Acid reflux     Anxiety     [facility]age of coronary artery of heart ([facility]     sees dr. Ashley Marmolejo Carotid artery disease ([facility] [facility]     CHF [facility]congestive heart failure) ([facility]     Hyperlipidemia     Hypertension     Osteoarthritis     rheumatoid    Post-menopausal     PVD (peripheral vascular disease) ([facility])     Raynaud disease     Stomach ulcer     Type 2 diabetes mellitus without complication ([facility] [address] [facility]        Past Surgical History:        Procedure Laterality Date    APPENDECTOMY      CARDIAC SURGERY      x2    CORONARY ANGIOPLASTY WITH STENT PLACEMENT      HERNIA REPAIR      INCISIONAL HERNIA REPAIR      TONSILLECTOMY AND ADENOIDECTOMY      VASCULAR SURGERY      stent       Social History:    Social History   Substance Use Topics    Smoking status: Former Smoker     Quit date: 12/26/2011    Smokeless tobacco: Never Used    Alcohol use No                                Counseling given: Not Answered      Vital Signs

## 2018-01-22 NOTE — BRIEF OP NOTE
Brief Postoperative Note    Sunshine Florence  YOB: 1950  [id number]    Pre-operative Diagnosis: L GAIL    Post-operative Diagnosis: Same    Procedure: L CEA    Anesthesia: General    Surgeons/Assistants: Tatyana     Estimated Blood Loss: less than 50     Complications: None    Specimens: Was Not Obtained    Findings: see op note    Electronically signed by Butch Scott MD on 1/22/2018 at 9:53 AM

## 2018-01-22 NOTE — ANESTHESIA POSTPROCEDURE EVALUATION
Department of Anesthesiology  Postprocedure Note    Patient: Lyn Sotelo  MRN: [id number]  YOB: 1950  Date of evaluation: 1/22/2018  Time:  9:43 AM     Procedure Summary     Date:  01/22/18 Room / Location:  [facility] OR  / [facility] OR    Anesthesia Start:  0736 Anesthesia Stop:  Darrall Mylar    Procedure:  CAROTID ENDARTERECTOMY (Left ) Diagnosis:  (L16.83 )    Surgeon:  Albaro Ness MD Responsible Provider:  Christen Renner CRNA    Anesthesia Type:  general ASA Status:  3          Anesthesia Type: general    Chuyita Phase I: Chuyita Score: 10    Chuyita Phase II:      Last vitals: Reviewed and per EMR flowsheets.        Anesthesia Post Evaluation    Patient location during evaluation: PACU  Patient participation: complete - patient participated  Level of consciousness: awake and alert  Pain score: 2  Airway patency: patent  Nausea & Vomiting: no nausea and no vomiting  Complications: no  Cardiovascular status: blood pressure returned to baseline  Respiratory status: acceptable, spontaneous ventilation and nasal cannula  Hydration status: stable

## 2018-01-23 VITALS
DIASTOLIC BLOOD PRESSURE: 56 MMHG | OXYGEN SATURATION: 100 % | WEIGHT: 184 LBS | BODY MASS INDEX: 29.57 KG/M2 | HEART RATE: 52 BPM | HEIGHT: 66 IN | TEMPERATURE: 97.4 F | SYSTOLIC BLOOD PRESSURE: 140 MMHG | RESPIRATION RATE: 18 BRPM

## 2018-01-23 LAB
GLUCOSE BLD-MCNC: 138 MG/DL (ref 70–99)
GLUCOSE BLD-MCNC: 164 MG/DL (ref 70–99)
PERFORMED ON: ABNORMAL
PERFORMED ON: ABNORMAL

## 2018-01-23 PROCEDURE — 6370000000 HC RX 637 (ALT 250 FOR IP): Performed by: SURGERY

## 2018-01-23 PROCEDURE — 99024 POSTOP FOLLOW-UP VISIT: CPT | Performed by: NURSE PRACTITIONER

## 2018-01-23 PROCEDURE — 82948 REAGENT STRIP/BLOOD GLUCOSE: CPT

## 2018-01-23 PROCEDURE — 6360000002 HC RX W HCPCS: Performed by: SURGERY

## 2018-01-23 RX ORDER — HYDROCODONE BITARTRATE AND ACETAMINOPHEN 5; 325 MG/1; MG/1
2 TABLET ORAL EVERY 4 HOURS PRN
Qty: 30 TABLET | Refills: 0
Start: 2018-01-23 | End: 2018-01-30

## 2018-01-23 RX ORDER — ONDANSETRON 8 MG/1
8 TABLET, ORALLY DISINTEGRATING ORAL EVERY 8 HOURS PRN
Qty: 20 TABLET | Refills: 0 | Status: SHIPPED | OUTPATIENT
Start: 2018-01-23

## 2018-01-23 RX ADMIN — HYDROCODONE BITARTRATE AND ACETAMINOPHEN 2 TABLET: 5; 325 TABLET ORAL at 11:14

## 2018-01-23 RX ADMIN — INSULIN LISPRO 2 UNITS: 100 INJECTION, SOLUTION INTRAVENOUS; SUBCUTANEOUS at 13:20

## 2018-01-23 RX ADMIN — ASPIRIN 81 MG: 81 TABLET, COATED ORAL at 08:21

## 2018-01-23 RX ADMIN — ONDANSETRON 4 MG: 2 INJECTION INTRAMUSCULAR; INTRAVENOUS at 06:48

## 2018-01-23 RX ADMIN — METFORMIN HYDROCHLORIDE 500 MG: 500 TABLET, FILM COATED ORAL at 08:21

## 2018-01-23 RX ADMIN — CLOPIDOGREL BISULFATE 75 MG: 75 TABLET ORAL at 08:21

## 2018-01-23 RX ADMIN — ONDANSETRON 4 MG: 2 INJECTION INTRAMUSCULAR; INTRAVENOUS at 12:48

## 2018-01-23 ASSESSMENT — PAIN SCALES - GENERAL: PAINLEVEL_OUTOF10: 4

## 2018-01-23 NOTE — OP NOTE
JOSE ELIAS [employer] [facility] ROSANNA [address], [address] [facility]                                 OPERATIVE REPORT    PATIENT NAME: Donnita Ganser                        :        1950  MED REC NO:   [id number]                              ROOM:       [facility]  ACCOUNT NO:   [de-identified]                           ADMIT DATE: 2018  PROVIDER:     José Manuel Alarcon MD      DATE OF PROCEDURE:  2018    PREOPERATIVE DIAGNOSIS:  Symptomatic left internal carotid artery stenosis. POSTOPERATIVE DIAGNOSIS:  Symptomatic left internal carotid artery  stenosis. PROCEDURE PERFORMED:  Left carotid endarterectomy with indwelling shunt and  pericardial Bovine patch. SURGEON:  Dr. Boaz Buchanan. ANESTHESIA:  General.    BLOOD LOSS:  Minimal.    FLUIDS:  Per anesthesia. COMPLICATIONS:  None. OPERATIVE PROCEDURE:  The patient identified in the operating room, general  anesthesia obtained, prepped and draped in sterile fashion. Surgical  time-out was performed. Preoperative antibiotics were given. The left  side of the neck had been marked in the preoperative area and the patient  agreed that this was the correct site. The indication for surgery is the patient with symptomatic left carotid  stenosis. She has had episodes of transient difficulty with speech. Workup including an ultrasound and a CTA showed a 70% left common-internal  carotid artery stenosis, left carotid endarterectomy recommended for stroke  prophylaxis. After the patient was prepped and draped in sterile fashion, incision made  in the left neck through skin and subcutaneous tissue down to the platysma  layer, platysma was transected. Sternocleidomastoid was identified and  retracted laterally revealing the underlying carotid sheath. The jugular  vein was retracted laterally. The fascial vein was dissected free and  retracted laterally. The bifurcation was actually fairly low. Common  carotid was identified, the nerve was identified and carefully avoided. Common carotid was encircled with a vessel loop. The patient was  systemically heparinized at this point. The external and internal carotids  were then dissected free, staying away from the diseased process. At this  point, the patient was fully heparinized. Clamps were placed sequentially  on the internal, external, and common carotid. Arteriotomy was made with  an 11 blade, extended with March scissors. There was a heavily calcified  plaque in the distal internal _____ internal.  A Sundt shunt was placed in  the usual fashion with shunt clamps with care made that there was no air in  the line. Endarterectomy then carried out in the usual adventitial medial  plane. Distally, I did use two tacking sutures of 7-0 Prolene. An  eversion endarterectomy of the external was performed and a plaque was  transected at the common. All loose debris was removed. A pericardial  patch was then sutured into the face of the internal carotid with 6-0  Prolene in a running fashion. A four-quadrant technique was used. The  shunt was removed. Flushing was performed. The anastomosis was complete. Clamps were released sequentially from the external, common and then the  internal carotid. Excellent low resistance signal was demonstrated with a  Doppler and the internal common and the external were normally pulsatile  with good Doppler signals. Hemostasis was good. The wound was closed in  layers of 3-0 Vicryl and 4-0 Monocryl. The patient awoke, moving all four  extremities, to recovery room in stable condition.         Maddie Le MD    D: 01/22/2018 16:20:07       T: 01/22/2018 20:35:24     [id number]  Job#: [id number]     Doc#: [id number]    CC:  MD Zully lElis MD

## 2018-01-30 ENCOUNTER — OFFICE VISIT (OUTPATIENT)
Dept: INTERNAL MEDICINE | Facility: CLINIC | Age: 68
End: 2018-01-30

## 2018-01-30 VITALS
BODY MASS INDEX: 28.62 KG/M2 | SYSTOLIC BLOOD PRESSURE: 145 MMHG | HEART RATE: 58 BPM | OXYGEN SATURATION: 96 % | TEMPERATURE: 97.8 F | WEIGHT: 177.3 LBS | DIASTOLIC BLOOD PRESSURE: 67 MMHG | RESPIRATION RATE: 16 BRPM

## 2018-01-30 DIAGNOSIS — I10 ESSENTIAL HYPERTENSION: Primary | ICD-10-CM

## 2018-01-30 DIAGNOSIS — Z98.890 HISTORY OF LEFT-SIDED CAROTID ENDARTERECTOMY: ICD-10-CM

## 2018-01-30 PROCEDURE — 99214 OFFICE O/P EST MOD 30 MIN: CPT | Performed by: NURSE PRACTITIONER

## 2018-01-30 RX ORDER — HYDROCODONE BITARTRATE AND ACETAMINOPHEN 5; 325 MG/1; MG/1
TABLET ORAL 4 TIMES DAILY PRN
COMMUNITY
Start: 2018-01-23 | End: 2018-01-30

## 2018-01-30 RX ORDER — ONDANSETRON 8 MG/1
8 TABLET, ORALLY DISINTEGRATING ORAL
COMMUNITY
Start: 2018-01-23 | End: 2018-03-19

## 2018-01-30 RX ORDER — LISINOPRIL AND HYDROCHLOROTHIAZIDE 25; 20 MG/1; MG/1
1 TABLET ORAL DAILY
Qty: 90 TABLET | Refills: 2 | Status: SHIPPED | OUTPATIENT
Start: 2018-01-30 | End: 2018-08-02 | Stop reason: SDUPTHER

## 2018-01-30 RX ORDER — AMLODIPINE BESYLATE 5 MG/1
5 TABLET ORAL DAILY
Qty: 90 TABLET | Refills: 2 | Status: SHIPPED | OUTPATIENT
Start: 2018-01-30 | End: 2018-08-02 | Stop reason: SDUPTHER

## 2018-01-30 NOTE — PROGRESS NOTES
CC: hospital follow up - left CEA    History:  Linda Pickens is a 67 y.o. female who presents today for evaluation of the above problems.    Mrs. Pickens is here today for her 1 week follow up post left CEA by Dr. Valadez on 1/22 for a 50-70% occlusion of the left common carotid artery.  Linda was previously having repeated episodes of facial tingling, accompanied by slurred speech at times, and has also had left sided facial swelling accompanying the episodes.  The episodes would additionally present with nausea and emesis.   Today, Linda reports that  She has really struggled with nausea since her surgery, but did not have emesis yesterday, nor so far today. She has had no more episodes of facial tingling and accompanying symptoms.  BP is still slightly elevated today, and patient reports that it has been more significantly elevated at home as well.  She reports readings of 160's to 170's over 80's and 90's., both in the AM and PM. She has had no chest pain or shortness of breath.        ROS:  Review of Systems   Constitutional: Negative for chills and fever.   Respiratory: Negative for shortness of breath.    Cardiovascular: Negative for chest pain.   Gastrointestinal: Negative for nausea and vomiting.   Skin: Positive for wound.   Neurological: Negative for dizziness, light-headedness and headaches.       Allergies   Allergen Reactions   • Demerol [Meperidine] GI Intolerance   • Chantix [Varenicline] Hallucinations   • Meperidine And Related GI Intolerance   • Latex Rash     Pt states she is not allergic     Past Medical History:   Diagnosis Date   • Anxiety    • Arthritis    • CAD (coronary artery disease)    • CHF (congestive heart failure)    • Depression    • Diabetes mellitus    • HTN (hypertension)    • PONV (postoperative nausea and vomiting)    • Vitamin D deficiency      Past Surgical History:   Procedure Laterality Date   • APPENDECTOMY     • CARDIAC CATHETERIZATION      [place]   • CORONARY ARTERY  BYPASS GRAFT  1992    x 2   • CORONARY STENT PLACEMENT  2014    X 5   • ENDOSCOPY N/A 1/4/2017    Procedure: ESOPHAGOGASTRODUODENOSCOPY WITH ANESTHESIA;  Surgeon: Maico Shelton DO;  Location: [facility] ENDOSCOPY;  Service:    • HERNIA REPAIR     • SHOULDER SURGERY     • TONSILLECTOMY       Family History   Problem Relation Age of Onset   • Breast cancer Mother    • Lung cancer Mother    • No Known Problems Father    • Arrhythmia Maternal Grandmother    • Heart disease Paternal Grandmother    • Diabetes Paternal Grandmother    • Heart disease Paternal Grandfather    • Diabetes Paternal Grandfather    • Lung cancer Maternal Grandfather    • Colon cancer Neg Hx    • Colon polyps Neg Hx    • Liver cancer Neg Hx    • Liver disease Neg Hx       reports that she quit smoking about 6 years ago. Her smoking use included Cigarettes. She quit after 35.00 years of use. She has never used smokeless tobacco. She reports that she does not drink alcohol or use illicit drugs.      Current Outpatient Prescriptions:   •  aspirin 81 MG EC tablet, Take 1 tablet by mouth Daily., Disp: 90 tablet, Rfl: 3  •  atenolol (TENORMIN) 50 MG tablet, Take 1 tablet by mouth Daily., Disp: 90 tablet, Rfl: 3  •  atorvastatin (LIPITOR) 40 MG tablet, Take 1 tablet by mouth Daily., Disp: 90 tablet, Rfl: 3  •  benzonatate (TESSALON PERLES) 100 MG capsule, Take 1 capsule by mouth 3 (Three) Times a Day As Needed for Cough., Disp: 90 capsule, Rfl: 3  •  Blood Glucose Monitoring Suppl (ONE TOUCH ULTRA MINI) W/DEVICE kit, Inject 1 strip under the skin 2 (Two) Times a Day., Disp: 1 each, Rfl: 11  •  budesonide-formoterol (SYMBICORT) 80-4.5 MCG/ACT inhaler, Inhale 2 puffs 2 (Two) Times a Day., Disp: 1 inhaler, Rfl: 12  •  clopidogrel (PLAVIX) 75 MG tablet, Take 1 tablet by mouth Daily., Disp: 90 tablet, Rfl: 3  •  DiphenhydrAMINE HCl, Sleep, 50 MG capsule, Take  by mouth., Disp: , Rfl:   •  HYDROcodone-acetaminophen (NORCO) 5-325 MG per tablet, Take  by mouth 4  (Four) Times a Day As Needed., Disp: , Rfl:   •  metFORMIN (GLUCOPHAGE) 500 MG tablet, Take 1 tablet by mouth 2 (Two) Times a Day With Meals., Disp: 180 tablet, Rfl: 2  •  ondansetron ODT (ZOFRAN-ODT) 8 MG disintegrating tablet, Take 8 mg by mouth., Disp: , Rfl:   •  ONE TOUCH ULTRA TEST test strip, 1 each by Other route 3 (Three) Times a Day., Disp: 180 each, Rfl: 1  •  pantoprazole (PROTONIX) 40 MG EC tablet, Take 1 tablet by mouth Daily., Disp: 90 tablet, Rfl: 3  •  venlafaxine (EFFEXOR) 75 MG tablet, Take 75 mg by mouth Daily., Disp: , Rfl:   •  albuterol (PROVENTIL HFA;VENTOLIN HFA) 108 (90 Base) MCG/ACT inhaler, Inhale 2 puffs Every 4 (Four) Hours As Needed for Wheezing., Disp: 1 inhaler, Rfl: 0  •  guaifenesin-codeine (GUAIFENESIN AC) 100-10 MG/5ML liquid, Take 5 mL by mouth 3 (Three) Times a Day As Needed for Cough., Disp: 180 mL, Rfl: 0  •  tiZANidine (ZANAFLEX) 4 MG tablet, Take 4 mg by mouth At Night As Needed for Muscle Spasms., Disp: , Rfl:     OBJECTIVE:  /67 (BP Location: Right arm, Patient Position: Sitting, Cuff Size: Adult)  Pulse 58  Temp 97.8 °F (36.6 °C) (Oral)   Resp 16  Wt 80.4 kg (177 lb 4.8 oz)  SpO2 96%  BMI 28.62 kg/m2   Physical Exam   Constitutional: She is oriented to person, place, and time. She appears well-developed and well-nourished. She is cooperative. She does not have a sickly appearance.   HENT:   Head: Normocephalic.   Eyes: Conjunctivae and lids are normal. Right eye exhibits no discharge. Left eye exhibits no discharge.   Neck:       Surgical incision site is clean, dry, and intact, with no redness or drainage.   Cardiovascular: Normal rate, regular rhythm and normal heart sounds.  Exam reveals no gallop and no friction rub.    No murmur heard.  Pulmonary/Chest: Effort normal and breath sounds normal. No tachypnea. No respiratory distress. She has no wheezes. She has no rales. She exhibits no tenderness.   Abdominal: Soft. Bowel sounds are normal. She exhibits no  distension. There is no tenderness.   Neurological: She is alert and oriented to person, place, and time.   Skin: Skin is warm and dry. Laceration noted. No rash noted. She is not diaphoretic. No erythema.        Psychiatric: She has a normal mood and affect. Her speech is normal and behavior is normal. Thought content normal.   Vitals reviewed.      Assessment/Plan    Diagnoses and all orders for this visit:    Essential hypertension  -     lisinopril-hydrochlorothiazide (PRINZIDE,ZESTORETIC) 20-25 MG per tablet; Take 1 tablet by mouth Daily.  -     amLODIPine (NORVASC) 5 MG tablet; Take 1 tablet by mouth Daily.  Due to continued elevations over JNC8 goal of <140/90 I am going to adjust Linda's therapy today by adding amlodipine 5 mg daily.  Additionally, I have cancelled her individual lisinopril and hctz and ordered these as a combination pill.      History of left-sided carotid endarterectomy  Incision is clean, dry and intact with no redness or drainage and she has had no episodes of facial tingling or numbness.  Keep all follow up appointments as scheduled.        An After Visit Summary was printed and given to the patient at discharge.  Return for Next scheduled follow up.         LATASHA Davis. 1/30/2018

## 2018-02-05 ENCOUNTER — OFFICE VISIT (OUTPATIENT)
Dept: NEUROLOGY | Facility: CLINIC | Age: 68
End: 2018-02-05

## 2018-02-05 VITALS
RESPIRATION RATE: 18 BRPM | HEIGHT: 66 IN | HEART RATE: 62 BPM | SYSTOLIC BLOOD PRESSURE: 120 MMHG | DIASTOLIC BLOOD PRESSURE: 80 MMHG | WEIGHT: 179 LBS | BODY MASS INDEX: 28.77 KG/M2

## 2018-02-05 DIAGNOSIS — I65.22 LEFT CAROTID STENOSIS: Primary | ICD-10-CM

## 2018-02-05 PROCEDURE — 99213 OFFICE O/P EST LOW 20 MIN: CPT | Performed by: PSYCHIATRY & NEUROLOGY

## 2018-02-05 NOTE — PROGRESS NOTES
Subjective   Linda JOHNOSN [facility], 1950, is a female who is being seen today for   Chief Complaint   Patient presents with   • Gait Problem       HISTORY OF PRESENT ILLNESS: Patient seen for status post left carotid endarterectomy.  Patient's surgery went well and had no further stroke symptoms.  Patient has had MRA brain that shows intracranial disease as well and I warned the patient that if she should have further stroke symptoms to go to emergency room immediately.  Patient is taking her aspirin and Plavix and statin.    REVIEW OF SYSTEMS:   GENERAL: As above  PULMONARY: No acute shortness breath  CVS:  Patient follows with Dr. Mak  GASTROINTESTINAL: No acute GI distress  GENITOURINARY: No acute  distress  GYN: No acute GYN distress  MUSCULOSKELETAL: No musculoskeletal symptoms  HEENT:  No hoarseness now.  Patient was slightly hoarse after that surgery  ENDOCRINE:  No acute endocrine symptoms.  Patient is diabetic  PSYCHIATRIC: No acute psych symptoms  HEMATOLOGY: No anemia preop  SKIN: No skin changes  Family history reviewed and otherwise noncontributory  Social history: Patient denies smoking or drug or alcohol use at this time    PHYSICAL EXAMINATION:    GENERAL: No acute distress and wound looks good  CRANIUM: Normocephalic/atraumatic  HEENT: No acute fundic abnormalities.  Pupils equal round reactive to light.       EYES: EOMs intact without nystagmus and fields full to confrontation       EARS:  Tympanic membranes normal hears tuning fork bilaterally       THROAT: No oropharynx abnormalities       NECK:  No bruits/no lymphadenopathy  CHEST: No acute cardiopulmonary abnormalities by auscultation  ABDOMEN: Nondistended  EXTREMITIES: Pulses symmetrical  NEURO: Patient alert and follows commands without difficulty  SPEECH:  Speech normal    CRANIAL NERVES:  Motor sensory about the face normal and symmetric.  There is a small area around the surgical wound that has decreased pin sensation    MOTOR STRENGTH:   Motor strength upper and lower extremities normal  STATION AND GAIT:  Gait is normal/Romberg negative  CEREBELLAR:  Finger-nose and heel shin normal  SENSORY:  Patient has decreased pin and vibration distal to proximal in the upper extremities to the wrist bilaterally and lower extremities to the ankles bilaterally  REFLEXES:  Reflexes decreased throughout upper and lower extremities without Babinski's or clonus      ASSESSMENT AND PLAN:  Patient with history of carotid stenosis on the left.  Stable at this time and to go to emergency room immediately if further stroke symptoms.  Patient has some neuropathic findings and is to get her blood work we ordered.      Linda was seen today for gait problem.    Diagnoses and all orders for this visit:    Left carotid stenosis

## 2018-02-07 ENCOUNTER — OFFICE VISIT (OUTPATIENT)
Dept: VASCULAR SURGERY | Age: 68
End: 2018-02-07

## 2018-02-07 VITALS
SYSTOLIC BLOOD PRESSURE: 141 MMHG | HEART RATE: 58 BPM | RESPIRATION RATE: 18 BRPM | DIASTOLIC BLOOD PRESSURE: 75 MMHG | TEMPERATURE: 96.5 F

## 2018-02-07 DIAGNOSIS — I65.23 BILATERAL CAROTID ARTERY STENOSIS: Primary | ICD-10-CM

## 2018-02-07 PROCEDURE — 99024 POSTOP FOLLOW-UP VISIT: CPT | Performed by: NURSE PRACTITIONER

## 2018-02-09 ENCOUNTER — LAB (OUTPATIENT)
Dept: LAB | Facility: HOSPITAL | Age: 68
End: 2018-02-09
Attending: PSYCHIATRY & NEUROLOGY

## 2018-02-09 DIAGNOSIS — R26.89 IMBALANCE: ICD-10-CM

## 2018-02-09 DIAGNOSIS — I10 ESSENTIAL (PRIMARY) HYPERTENSION: ICD-10-CM

## 2018-02-09 LAB
ALBUMIN SERPL-MCNC: 4 G/DL (ref 3.5–5)
ALBUMIN/GLOB SERPL: 1.2 G/DL (ref 1.1–2.5)
ALP SERPL-CCNC: 107 U/L (ref 24–120)
ALT SERPL W P-5'-P-CCNC: 33 U/L (ref 0–54)
ANION GAP SERPL CALCULATED.3IONS-SCNC: 11 MMOL/L (ref 4–13)
ARTICHOKE IGE QN: 123 MG/DL (ref 0–99)
AST SERPL-CCNC: 29 U/L (ref 7–45)
BASOPHILS # BLD AUTO: 0.06 10*3/MM3 (ref 0–0.2)
BASOPHILS NFR BLD AUTO: 0.7 % (ref 0–2)
BILIRUB SERPL-MCNC: 0.6 MG/DL (ref 0.1–1)
BUN BLD-MCNC: 14 MG/DL (ref 5–21)
BUN/CREAT SERPL: 18.7 (ref 7–25)
CALCIUM SPEC-SCNC: 9.4 MG/DL (ref 8.4–10.4)
CHLORIDE SERPL-SCNC: 100 MMOL/L (ref 98–110)
CHOLEST SERPL-MCNC: 189 MG/DL (ref 130–200)
CO2 SERPL-SCNC: 31 MMOL/L (ref 24–31)
CREAT BLD-MCNC: 0.75 MG/DL (ref 0.5–1.4)
DEPRECATED RDW RBC AUTO: 39.3 FL (ref 40–54)
EOSINOPHIL # BLD AUTO: 0.43 10*3/MM3 (ref 0–0.7)
EOSINOPHIL NFR BLD AUTO: 4.7 % (ref 0–4)
ERYTHROCYTE [DISTWIDTH] IN BLOOD BY AUTOMATED COUNT: 12.1 % (ref 12–15)
ERYTHROCYTE [SEDIMENTATION RATE] IN BLOOD: 15 MM/HR (ref 0–20)
FOLATE SERPL-MCNC: 5.11 NG/ML
GFR SERPL CREATININE-BSD FRML MDRD: 78 ML/MIN/1.73
GLOBULIN UR ELPH-MCNC: 3.4 GM/DL
GLUCOSE BLD-MCNC: 216 MG/DL (ref 70–100)
HCT VFR BLD AUTO: 38.1 % (ref 37–47)
HCT VFR BLD AUTO: 38.1 % (ref 37–47)
HDLC SERPL-MCNC: 37 MG/DL
HGB BLD-MCNC: 12.9 G/DL (ref 12–16)
IMM GRANULOCYTES # BLD: 0.03 10*3/MM3 (ref 0–0.03)
IMM GRANULOCYTES NFR BLD: 0.3 % (ref 0–5)
LDLC/HDLC SERPL: 3.26 {RATIO}
LYMPHOCYTES # BLD AUTO: 2.25 10*3/MM3 (ref 0.72–4.86)
LYMPHOCYTES NFR BLD AUTO: 24.8 % (ref 15–45)
MAGNESIUM SERPL-MCNC: 1.6 MG/DL (ref 1.4–2.2)
MCH RBC QN AUTO: 30.1 PG (ref 28–32)
MCHC RBC AUTO-ENTMCNC: 33.9 G/DL (ref 33–36)
MCV RBC AUTO: 89 FL (ref 82–98)
MONOCYTES # BLD AUTO: 0.53 10*3/MM3 (ref 0.19–1.3)
MONOCYTES NFR BLD AUTO: 5.8 % (ref 4–12)
NEUTROPHILS # BLD AUTO: 5.77 10*3/MM3 (ref 1.87–8.4)
NEUTROPHILS NFR BLD AUTO: 63.7 % (ref 39–78)
NRBC BLD MANUAL-RTO: 0 /100 WBC (ref 0–0)
PA ADP PRP-ACNC: 8 PRU
PLATELET # BLD AUTO: 302 10*3/MM3 (ref 130–400)
PLATELET # BLD AUTO: 302 10*3/MM3 (ref 130–400)
PMV BLD AUTO: 11.5 FL (ref 6–12)
POTASSIUM BLD-SCNC: 4.1 MMOL/L (ref 3.5–5.3)
PROT SERPL-MCNC: 7.4 G/DL (ref 6.3–8.7)
RBC # BLD AUTO: 4.28 10*6/MM3 (ref 4.2–5.4)
SODIUM BLD-SCNC: 142 MMOL/L (ref 135–145)
T4 FREE SERPL-MCNC: 1.01 NG/DL (ref 0.78–2.19)
TRIGL SERPL-MCNC: 156 MG/DL (ref 0–149)
VIT B12 BLD-MCNC: 441 PG/ML (ref 239–931)
WBC NRBC COR # BLD: 9.07 10*3/MM3 (ref 4.8–10.8)

## 2018-02-09 PROCEDURE — 36415 COLL VENOUS BLD VENIPUNCTURE: CPT

## 2018-02-09 PROCEDURE — 82746 ASSAY OF FOLIC ACID SERUM: CPT | Performed by: PSYCHIATRY & NEUROLOGY

## 2018-02-09 PROCEDURE — 85025 COMPLETE CBC W/AUTO DIFF WBC: CPT | Performed by: PSYCHIATRY & NEUROLOGY

## 2018-02-09 PROCEDURE — 85651 RBC SED RATE NONAUTOMATED: CPT | Performed by: PSYCHIATRY & NEUROLOGY

## 2018-02-09 PROCEDURE — 83735 ASSAY OF MAGNESIUM: CPT | Performed by: PSYCHIATRY & NEUROLOGY

## 2018-02-09 PROCEDURE — 80053 COMPREHEN METABOLIC PANEL: CPT | Performed by: PSYCHIATRY & NEUROLOGY

## 2018-02-09 PROCEDURE — 80061 LIPID PANEL: CPT | Performed by: PSYCHIATRY & NEUROLOGY

## 2018-02-09 PROCEDURE — 85576 BLOOD PLATELET AGGREGATION: CPT | Performed by: PSYCHIATRY & NEUROLOGY

## 2018-02-09 PROCEDURE — 82607 VITAMIN B-12: CPT | Performed by: PSYCHIATRY & NEUROLOGY

## 2018-02-09 PROCEDURE — 84439 ASSAY OF FREE THYROXINE: CPT | Performed by: PSYCHIATRY & NEUROLOGY

## 2018-02-21 PROBLEM — M79.18 CHRONIC MUSCULOSKELETAL PAIN: Status: ACTIVE | Noted: 2018-02-21

## 2018-02-21 PROBLEM — G89.29 CHRONIC MUSCULOSKELETAL PAIN: Status: ACTIVE | Noted: 2018-02-21

## 2018-02-21 PROBLEM — I65.23 BILATERAL CAROTID ARTERY STENOSIS: Status: ACTIVE | Noted: 2018-01-22

## 2018-03-15 ENCOUNTER — DOCUMENTATION (OUTPATIENT)
Dept: ULTRASOUND IMAGING | Facility: HOSPITAL | Age: 68
End: 2018-03-15

## 2018-03-15 NOTE — PROGRESS NOTES
Alexandra, Staff with Dr. Lehman, notified that patient is due for annual low dose lung CT of chest with a smoking cessation counseling session.

## 2018-03-19 ENCOUNTER — OFFICE VISIT (OUTPATIENT)
Dept: INTERNAL MEDICINE | Facility: CLINIC | Age: 68
End: 2018-03-19

## 2018-03-19 VITALS
RESPIRATION RATE: 16 BRPM | HEART RATE: 63 BPM | BODY MASS INDEX: 29.04 KG/M2 | SYSTOLIC BLOOD PRESSURE: 124 MMHG | HEIGHT: 66 IN | TEMPERATURE: 97.8 F | WEIGHT: 180.7 LBS | OXYGEN SATURATION: 99 % | DIASTOLIC BLOOD PRESSURE: 71 MMHG

## 2018-03-19 DIAGNOSIS — R20.2 FACIAL PARESTHESIA: Primary | ICD-10-CM

## 2018-03-19 DIAGNOSIS — M19.90 ARTHRITIS: ICD-10-CM

## 2018-03-19 DIAGNOSIS — I10 ESSENTIAL HYPERTENSION: ICD-10-CM

## 2018-03-19 DIAGNOSIS — M79.18 CHRONIC MUSCULOSKELETAL PAIN: ICD-10-CM

## 2018-03-19 DIAGNOSIS — G89.29 CHRONIC MUSCULOSKELETAL PAIN: ICD-10-CM

## 2018-03-19 PROCEDURE — 99214 OFFICE O/P EST MOD 30 MIN: CPT | Performed by: NURSE PRACTITIONER

## 2018-03-19 RX ORDER — HYDROCODONE BITARTRATE AND ACETAMINOPHEN 7.5; 325 MG/1; MG/1
1 TABLET ORAL DAILY PRN
Qty: 10 TABLET | Refills: 0 | Status: SHIPPED | OUTPATIENT
Start: 2018-03-19 | End: 2019-09-17

## 2018-03-19 RX ORDER — HYDROCODONE BITARTRATE AND ACETAMINOPHEN 5; 325 MG/1; MG/1
1 TABLET ORAL EVERY 6 HOURS PRN
Qty: 10 TABLET | Refills: 0 | Status: CANCELLED | OUTPATIENT
Start: 2018-03-19

## 2018-03-19 NOTE — PROGRESS NOTES
CC: 4 month follow up - HTN    History:  Linda Pickens is a 68 y.o. female who presents today for evaluation of the above problems. About 1 month ago she started having episodes that are almost identical to what she had before her left carotid endarterectomy.  These include numbness and tingling of her mouth and tongue, followed by swelling of her left jaw.  About 15 minutes later she will start having emesis.  She feels like her face and palate are throbbing and then she will have dry heaving.  Also will have diarrhea. Notably, her speech was affected before her endarterectomy, and is no longer affected.   She did have 4 days of Augmentin and took this and did not have any events during the time she was taking antibiotic therapy.  Her blood pressure is well controlled today at 124/71, which is much improved from her pressures when she was at our office in January. She does continue to complain of generalized aches and pains, especially in her neck and hands, related to arthritis. Generally, OTC therapy relieves this, however, she does wish that she had something stronger at times.     ROS:  Review of Systems    Allergies   Allergen Reactions   • Demerol [Meperidine] GI Intolerance   • Chantix [Varenicline] Hallucinations   • Meperidine And Related GI Intolerance   • Latex Rash     Pt states she is not allergic     Past Medical History:   Diagnosis Date   • Anxiety    • Arthritis    • CAD (coronary artery disease)    • CHF (congestive heart failure)    • Depression    • Diabetes mellitus    • HTN (hypertension)    • PONV (postoperative nausea and vomiting)    • Vitamin D deficiency      Past Surgical History:   Procedure Laterality Date   • APPENDECTOMY     • CARDIAC CATHETERIZATION      [place]   • CORONARY ARTERY BYPASS GRAFT  1992    x 2   • CORONARY STENT PLACEMENT  2014    X 5   • ENDOSCOPY N/A 1/4/2017    Procedure: ESOPHAGOGASTRODUODENOSCOPY WITH ANESTHESIA;  Surgeon: Maico Shelton DO;  Location:   [facility] ENDOSCOPY;  Service:    • HERNIA REPAIR     • SHOULDER SURGERY     • TONSILLECTOMY       Family History   Problem Relation Age of Onset   • Breast cancer Mother    • Lung cancer Mother    • No Known Problems Father    • Arrhythmia Maternal Grandmother    • Heart disease Paternal Grandmother    • Diabetes Paternal Grandmother    • Heart disease Paternal Grandfather    • Diabetes Paternal Grandfather    • Lung cancer Maternal Grandfather    • Colon cancer Neg Hx    • Colon polyps Neg Hx    • Liver cancer Neg Hx    • Liver disease Neg Hx       reports that she quit smoking about 6 years ago. Her smoking use included Cigarettes. She quit after 35.00 years of use. She has never used smokeless tobacco. She reports that she does not drink alcohol or use drugs.      Current Outpatient Prescriptions:   •  albuterol (PROVENTIL HFA;VENTOLIN HFA) 108 (90 Base) MCG/ACT inhaler, Inhale 2 puffs Every 4 (Four) Hours As Needed for Wheezing., Disp: 1 inhaler, Rfl: 0  •  amLODIPine (NORVASC) 5 MG tablet, Take 1 tablet by mouth Daily., Disp: 90 tablet, Rfl: 2  •  aspirin 81 MG EC tablet, Take 1 tablet by mouth Daily., Disp: 90 tablet, Rfl: 3  •  atenolol (TENORMIN) 50 MG tablet, Take 1 tablet by mouth Daily., Disp: 90 tablet, Rfl: 3  •  atorvastatin (LIPITOR) 40 MG tablet, Take 1 tablet by mouth Daily., Disp: 90 tablet, Rfl: 3  •  Blood Glucose Monitoring Suppl (ONE TOUCH ULTRA MINI) W/DEVICE kit, Inject 1 strip under the skin 2 (Two) Times a Day., Disp: 1 each, Rfl: 11  •  budesonide-formoterol (SYMBICORT) 80-4.5 MCG/ACT inhaler, Inhale 2 puffs 2 (Two) Times a Day., Disp: 1 inhaler, Rfl: 12  •  clopidogrel (PLAVIX) 75 MG tablet, Take 1 tablet by mouth Daily., Disp: 90 tablet, Rfl: 3  •  DiphenhydrAMINE HCl, Sleep, 50 MG capsule, Take  by mouth., Disp: , Rfl:   •  lisinopril-hydrochlorothiazide (PRINZIDE,ZESTORETIC) 20-25 MG per tablet, Take 1 tablet by mouth Daily., Disp: 90 tablet, Rfl: 2  •  metFORMIN (GLUCOPHAGE) 500 MG tablet,  "Take 1 tablet by mouth 2 (Two) Times a Day With Meals., Disp: 180 tablet, Rfl: 2  •  ONE TOUCH ULTRA TEST test strip, 1 each by Other route 3 (Three) Times a Day., Disp: 180 each, Rfl: 1  •  pantoprazole (PROTONIX) 40 MG EC tablet, Take 1 tablet by mouth Daily., Disp: 90 tablet, Rfl: 3  •  venlafaxine (EFFEXOR) 75 MG tablet, Take 75 mg by mouth Daily., Disp: , Rfl:   •  HYDROcodone-acetaminophen (NORCO) 7.5-325 MG per tablet, Take 1 tablet by mouth Daily As Needed for Severe Pain ., Disp: 10 tablet, Rfl: 0    OBJECTIVE:  /71 (BP Location: Left arm, Patient Position: Sitting, Cuff Size: Adult)   Pulse 63   Temp 97.8 °F (36.6 °C) (Oral)   Resp 16   Ht 167.6 cm (66\")   Wt 82 kg (180 lb 11.2 oz)   SpO2 99%   BMI 29.17 kg/m²    Physical Exam   Constitutional: She is oriented to person, place, and time. She appears well-developed and well-nourished.   Cardiovascular: Normal rate.    Pulmonary/Chest: Effort normal.   Neurological: She is alert and oriented to person, place, and time.   Skin: Skin is warm and dry.        Psychiatric: She has a normal mood and affect.   Vitals reviewed.      Assessment/Plan    Diagnoses and all orders for this visit:    Facial paresthesia  I have advised Linda to notify her vascular surgeon of this change in her condition.  I will forward my note to them as well.  Her symptoms due appear to be related to a food allergy, however, since she had similar symptoms prior to her endarterectomy, I would like for her surgeon to be aware.  Also, I have asked to her to keep a log of what food she has eaten that precipitates an event.  Considerations are oral allergy syndrome, alpha-gal, or other food allergies. She will return in 2 weeks.    Arthritis  -     HYDROcodone-acetaminophen (NORCO) 7.5-325 MG per tablet; Take 1 tablet by mouth Every 6 (Six) Hours As Needed (pain).  Continue OTC therapy with [facility] for more severe exacerbations only. Will be ordered by Dr. Lehman. Previous RX of " Norco 7.5mg  10 tablets lasted her 5 months.     Essential hypertension  BP well controlled to JNC 8 guideline of <140/90.  Continue current therapy.    An After Visit Summary was printed and given to the patient at discharge.  Return in about 2 weeks (around 4/2/2018).         Ashley Drake, APRN  3/19/2018

## 2018-04-02 ENCOUNTER — OFFICE VISIT (OUTPATIENT)
Dept: INTERNAL MEDICINE | Facility: CLINIC | Age: 68
End: 2018-04-02

## 2018-04-02 VITALS
RESPIRATION RATE: 16 BRPM | OXYGEN SATURATION: 98 % | HEART RATE: 56 BPM | BODY MASS INDEX: 29.07 KG/M2 | TEMPERATURE: 97.6 F | DIASTOLIC BLOOD PRESSURE: 78 MMHG | SYSTOLIC BLOOD PRESSURE: 154 MMHG | WEIGHT: 180.9 LBS | HEIGHT: 66 IN

## 2018-04-02 DIAGNOSIS — R20.2 FACIAL PARESTHESIA: ICD-10-CM

## 2018-04-02 DIAGNOSIS — M79.18 CHRONIC MUSCULOSKELETAL PAIN: Primary | ICD-10-CM

## 2018-04-02 DIAGNOSIS — I10 ESSENTIAL HYPERTENSION: ICD-10-CM

## 2018-04-02 DIAGNOSIS — E11.42 TYPE 2 DIABETES MELLITUS WITH DIABETIC POLYNEUROPATHY, WITHOUT LONG-TERM CURRENT USE OF INSULIN (HCC): ICD-10-CM

## 2018-04-02 DIAGNOSIS — G89.29 CHRONIC MUSCULOSKELETAL PAIN: Primary | ICD-10-CM

## 2018-04-02 LAB — HBA1C MFR BLD: 8.8 %

## 2018-04-02 PROCEDURE — 83036 HEMOGLOBIN GLYCOSYLATED A1C: CPT | Performed by: NURSE PRACTITIONER

## 2018-04-02 PROCEDURE — 99214 OFFICE O/P EST MOD 30 MIN: CPT | Performed by: NURSE PRACTITIONER

## 2018-04-02 NOTE — PROGRESS NOTES
CC:  2 week follow up; numbness and tingling of mouth    History:  Linda Pickens is a 68 y.o. female who presents today for evaluation of the above problems.    Started metformin BID about 2 weeks ago. Had previously just been taking it once a day to allow her stomach to adjust to the medication.  She just returned from [place] and did have two episodes of numbness and tingling in her mouth and face with swelling of the left jawline while she was there. She did not keep record of intake at times of episodes as I advised at her previous appointment.  She took 6 days of antibiotics that she had at home and states that she has not had any further episodes.  She suspects that her pain is dental in nature and states that if it happens again she plans to follow up with her dentist.  Her BP is elevated today, however, she states that she has been in acute discomfort related to her neck pain.  A1C is 8.8 in my office today.        ROS:  Review of Systems   Respiratory: Positive for shortness of breath.    Cardiovascular: Negative for chest pain.   Musculoskeletal: Positive for neck pain.   Neurological: Positive for numbness.       Allergies   Allergen Reactions   • Demerol [Meperidine] GI Intolerance   • Chantix [Varenicline] Hallucinations   • Meperidine And Related GI Intolerance   • Latex Rash     Pt states she is not allergic     Past Medical History:   Diagnosis Date   • Anxiety    • Arthritis    • CAD (coronary artery disease)    • CHF (congestive heart failure)    • Depression    • Diabetes mellitus    • HTN (hypertension)    • PONV (postoperative nausea and vomiting)    • Vitamin D deficiency      Past Surgical History:   Procedure Laterality Date   • APPENDECTOMY     • CARDIAC CATHETERIZATION      [place]   • CORONARY ARTERY BYPASS GRAFT  1992    x 2   • CORONARY STENT PLACEMENT  2014    X 5   • ENDOSCOPY N/A 1/4/2017    Procedure: ESOPHAGOGASTRODUODENOSCOPY WITH ANESTHESIA;  Surgeon: Maico Shelton DO;   Location: [facility] ENDOSCOPY;  Service:    • HERNIA REPAIR     • SHOULDER SURGERY     • TONSILLECTOMY       Family History   Problem Relation Age of Onset   • Breast cancer Mother    • Lung cancer Mother    • No Known Problems Father    • Arrhythmia Maternal Grandmother    • Heart disease Paternal Grandmother    • Diabetes Paternal Grandmother    • Heart disease Paternal Grandfather    • Diabetes Paternal Grandfather    • Lung cancer Maternal Grandfather    • Colon cancer Neg Hx    • Colon polyps Neg Hx    • Liver cancer Neg Hx    • Liver disease Neg Hx       reports that she quit smoking about 6 years ago. Her smoking use included Cigarettes. She quit after 35.00 years of use. She has never used smokeless tobacco. She reports that she does not drink alcohol or use drugs.      Current Outpatient Prescriptions:   •  amLODIPine (NORVASC) 5 MG tablet, Take 1 tablet by mouth Daily., Disp: 90 tablet, Rfl: 2  •  aspirin 81 MG EC tablet, Take 1 tablet by mouth Daily., Disp: 90 tablet, Rfl: 3  •  atenolol (TENORMIN) 50 MG tablet, Take 1 tablet by mouth Daily., Disp: 90 tablet, Rfl: 3  •  atorvastatin (LIPITOR) 40 MG tablet, Take 1 tablet by mouth Daily., Disp: 90 tablet, Rfl: 3  •  Blood Glucose Monitoring Suppl (ONE TOUCH ULTRA MINI) W/DEVICE kit, Inject 1 strip under the skin 2 (Two) Times a Day., Disp: 1 each, Rfl: 11  •  clopidogrel (PLAVIX) 75 MG tablet, Take 1 tablet by mouth Daily., Disp: 90 tablet, Rfl: 3  •  DiphenhydrAMINE HCl, Sleep, 50 MG capsule, Take  by mouth., Disp: , Rfl:   •  HYDROcodone-acetaminophen (NORCO) 7.5-325 MG per tablet, Take 1 tablet by mouth Daily As Needed for Severe Pain ., Disp: 10 tablet, Rfl: 0  •  lisinopril-hydrochlorothiazide (PRINZIDE,ZESTORETIC) 20-25 MG per tablet, Take 1 tablet by mouth Daily., Disp: 90 tablet, Rfl: 2  •  metFORMIN (GLUCOPHAGE) 500 MG tablet, Take 1 tablet by mouth 2 (Two) Times a Day With Meals., Disp: 180 tablet, Rfl: 2  •  ONE TOUCH ULTRA TEST test strip, 1 each  "by Other route 3 (Three) Times a Day., Disp: 180 each, Rfl: 1  •  pantoprazole (PROTONIX) 40 MG EC tablet, Take 1 tablet by mouth Daily., Disp: 90 tablet, Rfl: 3  •  venlafaxine (EFFEXOR) 75 MG tablet, Take 75 mg by mouth Daily., Disp: , Rfl:   •  albuterol (PROVENTIL HFA;VENTOLIN HFA) 108 (90 Base) MCG/ACT inhaler, Inhale 2 puffs Every 4 (Four) Hours As Needed for Wheezing., Disp: 1 inhaler, Rfl: 0  •  budesonide-formoterol (SYMBICORT) 80-4.5 MCG/ACT inhaler, Inhale 2 puffs 2 (Two) Times a Day., Disp: 1 inhaler, Rfl: 12  •  Empagliflozin 10 MG tablet, Take 10 mg by mouth Daily., Disp: 30 tablet, Rfl: 5    OBJECTIVE:  /78 (BP Location: Left arm, Patient Position: Sitting, Cuff Size: Adult)   Pulse 56   Temp 97.6 °F (36.4 °C) (Oral)   Resp 16   Ht 167.6 cm (66\")   Wt 82.1 kg (180 lb 14.4 oz)   SpO2 98%   BMI 29.20 kg/m²    Physical Exam   Constitutional: She is oriented to person, place, and time. Vital signs are normal. She appears well-developed and well-nourished.   Cardiovascular: Normal rate.    Pulmonary/Chest: Effort normal.   Neurological: She is alert and oriented to person, place, and time.   Psychiatric: She has a normal mood and affect. Her behavior is normal.   Vitals reviewed.      Assessment/Plan    Diagnoses and all orders for this visit:    Chronic musculoskeletal pain  -     Ambulatory Referral to Orthopedic Surgery  This is a chronic and worsening problem.   She did have an MRI of the cervical spine in January.  She continues to have worsening discomfort.  Will refer to ortho at this time.    Type 2 diabetes mellitus with diabetic polyneuropathy, without long-term current use of insulin  -     POC Glycosylated Hemoglobin (Hb A1C)  -     Empagliflozin 10 MG tablet; Take 10 mg by mouth Daily.  A1C is 8.8 today and was 8.9 in November 2017.  Will add Jardiance today for increased blood sugar control as well as cardiovascular benefit.    Facial paresthesia  She failed to maintain record of " intake as I instructed, so I was unable to track her intake as it corresponded to her symptoms.  She also took antibiotics and feels that this was helpful, however, I do not believe that her symptoms are infection related as they are periodic and not persistent as would occur with infection.  I am still suspicious for food allergy, however, I am unable to evaluate this without a record of her intake.  She plans to follow up with her dentist if she has a future event.  I do not believe that her problem is dental in nature, however, I will wait for a future complaint before evaluating further.    Essential hypertension  BP is not well controlled at this time. I am not going to make adjustments today as she has been well controlled in both February and March and admits to increased pain today which could potentially be affecting blood pressure.    An After Visit Summary was printed and given to the patient at discharge.  Return in about 3 months (around 7/2/2018).         Ashley Drake, APRN. 4/2/2018

## 2018-04-05 ENCOUNTER — TELEPHONE (OUTPATIENT)
Dept: INTERNAL MEDICINE | Facility: CLINIC | Age: 68
End: 2018-04-05

## 2018-04-05 NOTE — TELEPHONE ENCOUNTER
Spoke with Mrs Pickens to let her know of an appointment we have scheduled for her she was driving and stated she couldn't write it down. So she asked if I would call and leave a message.    Left message for Mrs Pickens and let her know we scheduled her with [facility] on Monday, April 09th, 2018 at 140 pm with Dr Mike Causey located at [address] [place] [address] with Phone #[phone]. Also Advised if she had any questions or needed to reschedule to please call there office at [phone] and if she had any other questions to please call Dr Lehman's office at [phone].

## 2018-04-30 ENCOUNTER — HOSPITAL ENCOUNTER (EMERGENCY)
Facility: HOSPITAL | Age: 68
Discharge: HOME | End: 2018-04-30
Attending: FAMILY MEDICINE | Admitting: FAMILY MEDICINE

## 2018-04-30 ENCOUNTER — APPOINTMENT (OUTPATIENT)
Dept: CT IMAGING | Facility: HOSPITAL | Age: 68
End: 2018-04-30

## 2018-04-30 VITALS
DIASTOLIC BLOOD PRESSURE: 46 MMHG | WEIGHT: 180 LBS | OXYGEN SATURATION: 93 % | SYSTOLIC BLOOD PRESSURE: 131 MMHG | TEMPERATURE: 97.1 F | RESPIRATION RATE: 19 BRPM | BODY MASS INDEX: 28.93 KG/M2 | HEART RATE: 56 BPM | HEIGHT: 66 IN

## 2018-04-30 DIAGNOSIS — R11.2 NON-INTRACTABLE VOMITING WITH NAUSEA, UNSPECIFIED VOMITING TYPE: Primary | ICD-10-CM

## 2018-04-30 LAB
ALBUMIN SERPL-MCNC: 3.9 G/DL (ref 3.5–5)
ALBUMIN/GLOB SERPL: 1.2 G/DL (ref 1.1–2.5)
ALP SERPL-CCNC: 110 U/L (ref 24–120)
ALT SERPL W P-5'-P-CCNC: 26 U/L (ref 0–54)
ANION GAP SERPL CALCULATED.3IONS-SCNC: 12 MMOL/L (ref 4–13)
AST SERPL-CCNC: 28 U/L (ref 7–45)
BACTERIA UR QL AUTO: ABNORMAL /HPF
BASOPHILS # BLD AUTO: 0.1 10*3/MM3 (ref 0–0.2)
BASOPHILS NFR BLD AUTO: 0.6 % (ref 0–2)
BILIRUB SERPL-MCNC: 0.8 MG/DL (ref 0.1–1)
BILIRUB UR QL STRIP: NEGATIVE
BUN BLD-MCNC: 18 MG/DL (ref 5–21)
BUN/CREAT SERPL: 18.4 (ref 7–25)
CALCIUM SPEC-SCNC: 9.4 MG/DL (ref 8.4–10.4)
CHLORIDE SERPL-SCNC: 99 MMOL/L (ref 98–110)
CLARITY UR: CLEAR
CO2 SERPL-SCNC: 33 MMOL/L (ref 24–31)
COLOR UR: YELLOW
CREAT BLD-MCNC: 0.98 MG/DL (ref 0.5–1.4)
DEPRECATED RDW RBC AUTO: 40 FL (ref 40–54)
EOSINOPHIL # BLD AUTO: 0.26 10*3/MM3 (ref 0–0.7)
EOSINOPHIL NFR BLD AUTO: 1.6 % (ref 0–4)
ERYTHROCYTE [DISTWIDTH] IN BLOOD BY AUTOMATED COUNT: 12.3 % (ref 12–15)
GFR SERPL CREATININE-BSD FRML MDRD: 57 ML/MIN/1.73
GLOBULIN UR ELPH-MCNC: 3.3 GM/DL
GLUCOSE BLD-MCNC: 238 MG/DL (ref 70–100)
GLUCOSE UR STRIP-MCNC: NEGATIVE MG/DL
HCT VFR BLD AUTO: 41.8 % (ref 37–47)
HGB BLD-MCNC: 14 G/DL (ref 12–16)
HGB UR QL STRIP.AUTO: NEGATIVE
HYALINE CASTS UR QL AUTO: ABNORMAL /LPF
IMM GRANULOCYTES # BLD: 0.14 10*3/MM3 (ref 0–0.03)
IMM GRANULOCYTES NFR BLD: 0.8 % (ref 0–5)
KETONES UR QL STRIP: NEGATIVE
LEUKOCYTE ESTERASE UR QL STRIP.AUTO: ABNORMAL
LIPASE SERPL-CCNC: 66 U/L (ref 23–203)
LYMPHOCYTES # BLD AUTO: 2.78 10*3/MM3 (ref 0.72–4.86)
LYMPHOCYTES NFR BLD AUTO: 16.8 % (ref 15–45)
MCH RBC QN AUTO: 30.1 PG (ref 28–32)
MCHC RBC AUTO-ENTMCNC: 33.5 G/DL (ref 33–36)
MCV RBC AUTO: 89.9 FL (ref 82–98)
MONOCYTES # BLD AUTO: 0.95 10*3/MM3 (ref 0.19–1.3)
MONOCYTES NFR BLD AUTO: 5.7 % (ref 4–12)
NEUTROPHILS # BLD AUTO: 12.34 10*3/MM3 (ref 1.87–8.4)
NEUTROPHILS NFR BLD AUTO: 74.5 % (ref 39–78)
NITRITE UR QL STRIP: NEGATIVE
NRBC BLD MANUAL-RTO: 0 /100 WBC (ref 0–0)
PH UR STRIP.AUTO: 6.5 [PH] (ref 5–8)
PLATELET # BLD AUTO: 370 10*3/MM3 (ref 130–400)
PMV BLD AUTO: 11.1 FL (ref 6–12)
POTASSIUM BLD-SCNC: 3.9 MMOL/L (ref 3.5–5.3)
PROT SERPL-MCNC: 7.2 G/DL (ref 6.3–8.7)
PROT UR QL STRIP: ABNORMAL
RBC # BLD AUTO: 4.65 10*6/MM3 (ref 4.2–5.4)
RBC # UR: ABNORMAL /HPF
REF LAB TEST METHOD: ABNORMAL
SODIUM BLD-SCNC: 144 MMOL/L (ref 135–145)
SP GR UR STRIP: 1.01 (ref 1–1.03)
SQUAMOUS #/AREA URNS HPF: ABNORMAL /HPF
UROBILINOGEN UR QL STRIP: ABNORMAL
WBC NRBC COR # BLD: 16.57 10*3/MM3 (ref 4.8–10.8)
WBC UR QL AUTO: ABNORMAL /HPF

## 2018-04-30 PROCEDURE — 83690 ASSAY OF LIPASE: CPT | Performed by: FAMILY MEDICINE

## 2018-04-30 PROCEDURE — 74177 CT ABD & PELVIS W/CONTRAST: CPT

## 2018-04-30 PROCEDURE — 25010000002 IOPAMIDOL 61 % SOLUTION: Performed by: FAMILY MEDICINE

## 2018-04-30 PROCEDURE — 99283 EMERGENCY DEPT VISIT LOW MDM: CPT

## 2018-04-30 PROCEDURE — 81001 URINALYSIS AUTO W/SCOPE: CPT | Performed by: FAMILY MEDICINE

## 2018-04-30 PROCEDURE — 80053 COMPREHEN METABOLIC PANEL: CPT | Performed by: FAMILY MEDICINE

## 2018-04-30 PROCEDURE — 85025 COMPLETE CBC W/AUTO DIFF WBC: CPT | Performed by: FAMILY MEDICINE

## 2018-04-30 RX ORDER — CIPROFLOXACIN 750 MG/1
750 TABLET, FILM COATED ORAL 2 TIMES DAILY
Qty: 20 TABLET | Refills: 0 | Status: SHIPPED | OUTPATIENT
Start: 2018-04-30 | End: 2018-05-30 | Stop reason: SDDI

## 2018-04-30 RX ORDER — METRONIDAZOLE 500 MG/1
500 TABLET ORAL 4 TIMES DAILY
Qty: 40 TABLET | Refills: 0 | Status: SHIPPED | OUTPATIENT
Start: 2018-04-30 | End: 2018-05-30 | Stop reason: SDDI

## 2018-04-30 RX ORDER — PROMETHAZINE HYDROCHLORIDE 25 MG/1
25 TABLET ORAL EVERY 6 HOURS PRN
Qty: 25 TABLET | Refills: 0 | Status: SHIPPED | OUTPATIENT
Start: 2018-04-30 | End: 2019-01-22

## 2018-04-30 RX ADMIN — IOPAMIDOL 100 ML: 612 INJECTION, SOLUTION INTRAVENOUS at 16:28

## 2018-05-02 ENCOUNTER — OFFICE VISIT (OUTPATIENT)
Dept: INTERNAL MEDICINE | Facility: CLINIC | Age: 68
End: 2018-05-02

## 2018-05-02 ENCOUNTER — LAB (OUTPATIENT)
Dept: LAB | Facility: HOSPITAL | Age: 68
End: 2018-05-02

## 2018-05-02 VITALS
TEMPERATURE: 98 F | RESPIRATION RATE: 16 BRPM | WEIGHT: 176 LBS | BODY MASS INDEX: 28.28 KG/M2 | HEART RATE: 87 BPM | DIASTOLIC BLOOD PRESSURE: 80 MMHG | HEIGHT: 66 IN | SYSTOLIC BLOOD PRESSURE: 148 MMHG | OXYGEN SATURATION: 98 %

## 2018-05-02 DIAGNOSIS — Z12.39 BREAST CANCER SCREENING: ICD-10-CM

## 2018-05-02 DIAGNOSIS — T78.2XXD ANAPHYLAXIS, SUBSEQUENT ENCOUNTER: Primary | ICD-10-CM

## 2018-05-02 DIAGNOSIS — T78.40XD ALLERGIC STATE, SUBSEQUENT ENCOUNTER: ICD-10-CM

## 2018-05-02 DIAGNOSIS — T78.2XXD ANAPHYLAXIS, SUBSEQUENT ENCOUNTER: ICD-10-CM

## 2018-05-02 PROCEDURE — 86003 ALLG SPEC IGE CRUDE XTRC EA: CPT | Performed by: NURSE PRACTITIONER

## 2018-05-02 PROCEDURE — 99214 OFFICE O/P EST MOD 30 MIN: CPT | Performed by: NURSE PRACTITIONER

## 2018-05-02 PROCEDURE — 86008 ALLG SPEC IGE RECOMB EA: CPT | Performed by: NURSE PRACTITIONER

## 2018-05-02 PROCEDURE — 36415 COLL VENOUS BLD VENIPUNCTURE: CPT

## 2018-05-02 RX ORDER — EPINEPHRINE 0.3 MG/.3ML
0.3 INJECTION SUBCUTANEOUS ONCE
Status: DISCONTINUED | OUTPATIENT
Start: 2018-05-02 | End: 2018-05-02

## 2018-05-02 RX ORDER — EPINEPHRINE 0.3 MG/.3ML
0.3 INJECTION SUBCUTANEOUS ONCE
Qty: 1 EACH | Refills: 0 | Status: SHIPPED | OUTPATIENT
Start: 2018-05-02 | End: 2018-05-02

## 2018-05-02 RX ORDER — BACLOFEN 10 MG/1
TABLET ORAL
Refills: 5 | COMMUNITY
Start: 2018-04-09 | End: 2018-10-26

## 2018-05-02 NOTE — PROGRESS NOTES
CC: facial numbness and swelling    History:  Linda Pickens is a 68 y.o. female who presents today for evaluation of the above problems.    Linda continues to have episodes of facial tingling and swelling accompanied by nausea and dry heaving.  The most recent episode started on Saturday.  Her face turned red and then her face and throat began to swell and she was unable to swallow.  She notes that her  said that if he didn't know she was his wife he would not have recognized her. She also has diarrhea each time this happens.  These episodes have been happening for approximately a year at this point.   This was followed by dry heaving for 48 hours.  She went to the hospital on Monday and it was felt that her gallbladder was a potential problem. She did have a carotid endarterectomy of the left side several months ago.  She did go to the dentist in the last couple of days.  The dentist felt that she could potentially have a salivary stone.  Linda does have a picture of herself that she has shown me today from during the event.  She does admit that this was actually after it started to resolve.  There are fluid-filled bags visible underneath her eyes bilaterally.  She states that she has attempted to keep a log of her food intake but was not able to notice any similarities.  She reports that she is fine today, however, she notes that she does still have some evidence of the fluid-filled bags under her eyes.  She reports that it has gotten to the point where she is scared to eat because the episodes always originate with eating. She states that she is losing weight as a result of being afraid to eat.  She has had known tick exposure in the past.       ROS:  Review of Systems   Constitutional: Positive for fatigue and unexpected weight change.   Respiratory:        Difficulty breathing   Gastrointestinal: Positive for diarrhea, nausea and vomiting.        Difficulty swallowing   Neurological:        Numbness and  tingling of mouth       Allergies   Allergen Reactions   • Demerol [Meperidine] GI Intolerance   • Chantix [Varenicline] Hallucinations   • Meperidine And Related GI Intolerance   • Latex Rash     Pt states she is not allergic     Past Medical History:   Diagnosis Date   • Anxiety    • Arthritis    • CAD (coronary artery disease)    • CHF (congestive heart failure)    • Depression    • Diabetes mellitus    • HTN (hypertension)    • PONV (postoperative nausea and vomiting)    • Vitamin D deficiency      Past Surgical History:   Procedure Laterality Date   • APPENDECTOMY     • CARDIAC CATHETERIZATION      [place]   • CORONARY ARTERY BYPASS GRAFT  1992    x 2   • CORONARY STENT PLACEMENT  2014    X 5   • ENDOSCOPY N/A 1/4/2017    Procedure: ESOPHAGOGASTRODUODENOSCOPY WITH ANESTHESIA;  Surgeon: Maico Shelton DO;  Location: [facility] ENDOSCOPY;  Service:    • HERNIA REPAIR     • SHOULDER SURGERY     • TONSILLECTOMY       Family History   Problem Relation Age of Onset   • Breast cancer Mother    • Lung cancer Mother    • No Known Problems Father    • Arrhythmia Maternal Grandmother    • Heart disease Paternal Grandmother    • Diabetes Paternal Grandmother    • Heart disease Paternal Grandfather    • Diabetes Paternal Grandfather    • Lung cancer Maternal Grandfather    • Colon cancer Neg Hx    • Colon polyps Neg Hx    • Liver cancer Neg Hx    • Liver disease Neg Hx       reports that she quit smoking about 6 years ago. Her smoking use included Cigarettes. She quit after 35.00 years of use. She has never used smokeless tobacco. She reports that she does not drink alcohol or use drugs.      Current Outpatient Prescriptions:   •  aspirin 81 MG EC tablet, Take 1 tablet by mouth Daily., Disp: 90 tablet, Rfl: 3  •  atenolol (TENORMIN) 50 MG tablet, Take 1 tablet by mouth Daily., Disp: 90 tablet, Rfl: 3  •  atorvastatin (LIPITOR) 40 MG tablet, Take 1 tablet by mouth Daily., Disp: 90 tablet, Rfl: 3  •  baclofen (LIORESAL)  10 MG tablet, , Disp: , Rfl: 5  •  Blood Glucose Monitoring Suppl (ONE TOUCH ULTRA MINI) W/DEVICE kit, Inject 1 strip under the skin 2 (Two) Times a Day., Disp: 1 each, Rfl: 11  •  clopidogrel (PLAVIX) 75 MG tablet, Take 1 tablet by mouth Daily., Disp: 90 tablet, Rfl: 3  •  DiphenhydrAMINE HCl, Sleep, 50 MG capsule, Take  by mouth., Disp: , Rfl:   •  HYDROcodone-acetaminophen (NORCO) 7.5-325 MG per tablet, Take 1 tablet by mouth Daily As Needed for Severe Pain ., Disp: 10 tablet, Rfl: 0  •  lisinopril-hydrochlorothiazide (PRINZIDE,ZESTORETIC) 20-25 MG per tablet, Take 1 tablet by mouth Daily., Disp: 90 tablet, Rfl: 2  •  metFORMIN (GLUCOPHAGE) 500 MG tablet, Take 1 tablet by mouth 2 (Two) Times a Day With Meals., Disp: 180 tablet, Rfl: 2  •  ONE TOUCH ULTRA TEST test strip, 1 each by Other route 3 (Three) Times a Day., Disp: 180 each, Rfl: 1  •  pantoprazole (PROTONIX) 40 MG EC tablet, Take 1 tablet by mouth Daily., Disp: 90 tablet, Rfl: 3  •  promethazine (PHENERGAN) 25 MG tablet, Take 1 tablet by mouth Every 6 (Six) Hours As Needed for Nausea or Vomiting., Disp: 25 tablet, Rfl: 0  •  venlafaxine (EFFEXOR) 75 MG tablet, Take 75 mg by mouth Daily., Disp: , Rfl:   •  albuterol (PROVENTIL HFA;VENTOLIN HFA) 108 (90 Base) MCG/ACT inhaler, Inhale 2 puffs Every 4 (Four) Hours As Needed for Wheezing., Disp: 1 inhaler, Rfl: 0  •  amLODIPine (NORVASC) 5 MG tablet, Take 1 tablet by mouth Daily., Disp: 90 tablet, Rfl: 2  •  budesonide-formoterol (SYMBICORT) 80-4.5 MCG/ACT inhaler, Inhale 2 puffs 2 (Two) Times a Day., Disp: 1 inhaler, Rfl: 12  •  ciprofloxacin (CIPRO) 750 MG tablet, Take 1 tablet by mouth 2 (Two) Times a Day., Disp: 20 tablet, Rfl: 0  •  Empagliflozin 10 MG tablet, Take 10 mg by mouth Daily., Disp: 30 tablet, Rfl: 5  •  EPINEPHrine (EPIPEN) 0.3 MG/0.3ML solution auto-injector injection, Inject 0.3 mL into the shoulder, thigh, or buttocks 1 (One) Time for 1 dose., Disp: 1 each, Rfl: 0  •  metroNIDAZOLE (FLAGYL)  "500 MG tablet, Take 1 tablet by mouth 4 (Four) Times a Day., Disp: 40 tablet, Rfl: 0  No current facility-administered medications for this visit.     OBJECTIVE:  /80 (BP Location: Left arm, Patient Position: Sitting, Cuff Size: Adult)   Pulse 87   Temp 98 °F (36.7 °C) (Oral)   Resp 16   Ht 167.6 cm (66\")   Wt 79.8 kg (176 lb)   SpO2 98%   BMI 28.41 kg/m²    Physical Exam   Constitutional: She is oriented to person, place, and time. She appears well-developed and well-nourished.   HENT:   Fluid accumulation present bilaterally under here eyes.   Cardiovascular: Normal rate.    Pulmonary/Chest: Effort normal.   Neurological: She is alert and oriented to person, place, and time.   Psychiatric: She has a normal mood and affect. Her behavior is normal.   Vitals reviewed.      Assessment/Plan    Diagnoses and all orders for this visit:    Anaphylaxis, subsequent encounter  Allergic state, subsequent encounter   -     Alpha - Gal Panel; Future  -     Ambulatory Referral to Allergy  -     EPINEPHrine (EPIPEN) 0.3 MG/0.3ML solution auto-injector injection; Inject 0.3 mL into the shoulder, thigh, or buttocks 1 (One) Time for 1 dose.  Linda's persistent symptoms continue to sound like an allergic reaction to me.  I do not believe that a salivary stone or gallbladder etiology would cause a combination of the facial swelling, numbness, tingling, nausea and vomiting, while each could individually cause some of her symptoms.  It appears that the events are becoming more severe.  In the presence of known tick exposure, I would like to evaluate for alpha gal as well as refer to an allergy specialist.  Additionally, I am going to order an epi pen for her to have on hand for future events.     Breast cancer screening  -     Mammo Screening Bilateral With CAD; Future        An After Visit Summary was printed and given to the patient at discharge.  No Follow-up on file.         Ashley Drake, APRN  5/2/2018   "

## 2018-05-04 NOTE — ED NOTES
"ED Call Back Questions    1. How are you doing since leaving the Emergency Department?    Pt has JOANN colbert scheduled.  States she won't be back to [facility] and that our Dr. \"had her in tears.\"  Pt states she has already spoken to Evie Leon.  SW apologized.  2. Do you have any questions about your discharge instructions? No     3. Have you filled your new prescriptions yet? Yes   a. Do you have any questions about those medications? No     4. Were you able to make a follow-up appointment with the physician? Yes     5. Do you have a primary care physician? Yes   a. If No, would you like for me to set you up with one? N/A  i. If Yes, “I will have our ED  give you a call right back at this number to work with you on the best time for an appointment.”    6. We are always looking to get better at what we do. Do you have any suggestions for what we can do to be even better? No   a. If Yes, \"Thank you for sharing your concerns. I apologize. I will follow up with our manager and patient . Would you like someone to call you back?\" N/A    7. Is there anything else I can do for you? No     "

## 2018-05-07 ENCOUNTER — TELEPHONE (OUTPATIENT)
Dept: INTERNAL MEDICINE | Facility: CLINIC | Age: 68
End: 2018-05-07

## 2018-05-07 LAB
ALPHA GAL IGE: 0.15 KU/L
BEEF IGE QN: <0.1 KU/L
LAMB IGE QN: <0.1 KU/L
Lab: 0
PORK IGE: <0.1 KU/L

## 2018-05-07 NOTE — TELEPHONE ENCOUNTER
Called to see if her lab results regarding her tick bite have came in yet. She said that she hasn't heard anything and she's getting anxious to find out something.

## 2018-05-07 NOTE — TELEPHONE ENCOUNTER
Please advise that I just received the results this afternoon and it was negative.  Has she had an appointment set up with allergist yet?

## 2018-05-08 ENCOUNTER — TELEPHONE (OUTPATIENT)
Dept: INTERNAL MEDICINE | Facility: CLINIC | Age: 68
End: 2018-05-08

## 2018-05-08 ENCOUNTER — HOSPITAL ENCOUNTER (OUTPATIENT)
Dept: MAMMOGRAPHY | Facility: HOSPITAL | Age: 68
Discharge: HOME | End: 2018-05-08
Admitting: NURSE PRACTITIONER

## 2018-05-08 DIAGNOSIS — Z12.39 BREAST CANCER SCREENING: ICD-10-CM

## 2018-05-08 PROCEDURE — 77067 SCR MAMMO BI INCL CAD: CPT

## 2018-05-08 PROCEDURE — 77063 BREAST TOMOSYNTHESIS BI: CPT

## 2018-05-08 NOTE — TELEPHONE ENCOUNTER
patient came to office  Wanted her results of lab . Gave results and patient stated she has appointment this Friday with DR Cox

## 2018-05-10 ENCOUNTER — OFFICE VISIT (OUTPATIENT)
Dept: VASCULAR SURGERY | Age: 68
End: 2018-05-10
Payer: MEDICARE

## 2018-05-10 ENCOUNTER — HOSPITAL ENCOUNTER (OUTPATIENT)
Dept: VASCULAR LAB | Age: 68
Discharge: HOME | End: 2018-05-10
Payer: MEDICARE

## 2018-05-10 VITALS — TEMPERATURE: 97.6 F | DIASTOLIC BLOOD PRESSURE: 74 MMHG | SYSTOLIC BLOOD PRESSURE: 123 MMHG | HEART RATE: 59 BPM

## 2018-05-10 DIAGNOSIS — I65.23 BILATERAL CAROTID ARTERY STENOSIS: ICD-10-CM

## 2018-05-10 DIAGNOSIS — I65.23 BILATERAL CAROTID ARTERY STENOSIS: Primary | ICD-10-CM

## 2018-05-10 PROCEDURE — G8598 ASA/ANTIPLAT THER USED: HCPCS | Performed by: NURSE PRACTITIONER

## 2018-05-10 PROCEDURE — 99212 OFFICE O/P EST SF 10 MIN: CPT | Performed by: NURSE PRACTITIONER

## 2018-05-10 PROCEDURE — 93880 EXTRACRANIAL BILAT STUDY: CPT

## 2018-05-10 PROCEDURE — 1036F TOBACCO NON-USER: CPT | Performed by: NURSE PRACTITIONER

## 2018-05-10 PROCEDURE — 3017F COLORECTAL CA SCREEN DOC REV: CPT | Performed by: NURSE PRACTITIONER

## 2018-05-10 PROCEDURE — G8400 PT W/DXA NO RESULTS DOC: HCPCS | Performed by: NURSE PRACTITIONER

## 2018-05-10 PROCEDURE — 4040F PNEUMOC VAC/ADMIN/RCVD: CPT | Performed by: NURSE PRACTITIONER

## 2018-05-10 PROCEDURE — 1123F ACP DISCUSS/DSCN MKR DOCD: CPT | Performed by: NURSE PRACTITIONER

## 2018-05-10 PROCEDURE — G8427 DOCREV CUR MEDS BY ELIG CLIN: HCPCS | Performed by: NURSE PRACTITIONER

## 2018-05-10 PROCEDURE — G8419 CALC BMI OUT NRM PARAM NOF/U: HCPCS | Performed by: NURSE PRACTITIONER

## 2018-05-10 PROCEDURE — 1090F PRES/ABSN URINE INCON ASSESS: CPT | Performed by: NURSE PRACTITIONER

## 2018-05-11 ENCOUNTER — TRANSCRIBE ORDERS (OUTPATIENT)
Dept: ADMINISTRATIVE | Facility: HOSPITAL | Age: 68
End: 2018-05-11

## 2018-05-11 ENCOUNTER — APPOINTMENT (OUTPATIENT)
Dept: LAB | Facility: HOSPITAL | Age: 68
End: 2018-05-11

## 2018-05-11 DIAGNOSIS — G89.29 CHRONIC MUSCULOSKELETAL PAIN: Primary | ICD-10-CM

## 2018-05-11 DIAGNOSIS — L50.1 IDIOPATHIC URTICARIA: Primary | ICD-10-CM

## 2018-05-11 DIAGNOSIS — T78.2XXA ANAPHYLAXIS, INITIAL ENCOUNTER: ICD-10-CM

## 2018-05-11 DIAGNOSIS — M79.18 CHRONIC MUSCULOSKELETAL PAIN: Primary | ICD-10-CM

## 2018-05-11 PROCEDURE — 36415 COLL VENOUS BLD VENIPUNCTURE: CPT | Performed by: ALLERGY & IMMUNOLOGY

## 2018-05-11 PROCEDURE — 86003 ALLG SPEC IGE CRUDE XTRC EA: CPT | Performed by: ALLERGY & IMMUNOLOGY

## 2018-05-11 PROCEDURE — 83520 IMMUNOASSAY QUANT NOS NONAB: CPT | Performed by: ALLERGY & IMMUNOLOGY

## 2018-05-14 LAB — TRYPTASE SERPL-MCNC: 10.6 UG/L (ref 2.2–13.2)

## 2018-05-16 LAB
ALMOND IGE QN: <0.1 KU/L
ASPARAGUS IGE QN: <0.1 KU/L
BASIL IGE QN: <0.1 KU/L
BEEF IGE QN: <0.1 KU/L
BRAZIL NUT IGE QN: <0.1 KU/L
CALIF WALNUT POLN IGE QN: <0.1 KU/L
CARROT IGE QN: <0.1 KU/L
CASHEW NUT IGE QN: <0.1 KU/L
CATFISH IGE QN: <0.1 KU/L
CLAM IGE QN: <0.1 KU/L
CODFISH IGE QN: <0.1 KU/L
CONV CLASS DESCRIPTION: NORMAL
CORN IGE QN: <0.1 KU/L
COW MILK IGE QN: 0.13 KU/L
GINGER IGE QN: <0.1 KU/L
GREEN BEAN IGE QN: <0.1 KU/L
HAZELNUT IGE QN: <0.1 KU/L
LOBSTER IGE QN: <0.1 KU/L
LTX IGE QN: <0.1 KU/L
MUSHROOM IGE QN: <0.1 KU/L
MUSTARD IGE QN: <0.1 KU/L
NUTMEG IGE QN: <0.1 KU/L
OYSTER IGE QN: <0.1 KU/L
PARSLEY IGE QN: <0.1 KU/L
PEA IGE QN: <0.1 KU/L
PEANUT IGE QN: <0.1 KU/L
PECAN/HICK NUT IGE QN: <0.1 KU/L
PISTACHIO IGE QN: <0.1 KU/L
PORK IGE QN: <0.1 KU/L
POTATO IGE QN: <0.1 KU/L
RYE IGE: <0.1 KU/L
SALMON IGE QN: <0.1 KU/L
SESAME SEED IGE: <0.1 KU/L
SHRIMP IGE: <0.1 KU/L
SOYBEAN IGE QN: <0.1 KU/L
TOMATO IGE QN: <0.1 KU/L
TUNA IGE QN: <0.1 KU/L
VANILLA IGE QN: <0.1 KU/L
WHEAT IGE QN: <0.1 KU/L
WHOLE EGG IGE QN: 0.15 KU/L

## 2018-05-17 LAB — PEPPERMINT IGE AB [UNITS/VOLUME] IN SERUM: <0.1 KU/L

## 2018-05-21 LAB — REF LAB TEST RESULTS: NORMAL

## 2018-05-30 ENCOUNTER — TELEPHONE (OUTPATIENT)
Dept: INTERNAL MEDICINE | Facility: CLINIC | Age: 68
End: 2018-05-30

## 2018-05-30 ENCOUNTER — OFFICE VISIT (OUTPATIENT)
Dept: INTERNAL MEDICINE | Facility: CLINIC | Age: 68
End: 2018-05-30

## 2018-05-30 VITALS
WEIGHT: 176.3 LBS | OXYGEN SATURATION: 98 % | TEMPERATURE: 97.9 F | SYSTOLIC BLOOD PRESSURE: 148 MMHG | DIASTOLIC BLOOD PRESSURE: 79 MMHG | HEART RATE: 65 BPM | HEIGHT: 66 IN | RESPIRATION RATE: 16 BRPM | BODY MASS INDEX: 28.33 KG/M2

## 2018-05-30 DIAGNOSIS — K08.89 PAIN, DENTAL: Primary | ICD-10-CM

## 2018-05-30 DIAGNOSIS — T78.2XXD ANAPHYLAXIS, SUBSEQUENT ENCOUNTER: ICD-10-CM

## 2018-05-30 PROBLEM — T78.2XXA ANAPHYLACTIC SYNDROME: Status: ACTIVE | Noted: 2018-05-30

## 2018-05-30 PROCEDURE — 99214 OFFICE O/P EST MOD 30 MIN: CPT | Performed by: NURSE PRACTITIONER

## 2018-05-30 NOTE — PROGRESS NOTES
CC: emesis with facial swelling and numbness and tingling in her mouth    History:  Linda Pickens is a 68 y.o. female who presents today for evaluation of the above problems.    Has had two new episodes, one yesterday, and one Saturday of vomitting with blood shot eyes and swelling.    Each event lasted 30 minutes.  Yesterday occurred after eating cucumber and Saturday after eating french fries.  Has dental pain and soreness as well.  Mouth and teeth feel numb during event.. She notes that the swelling was not as extreme with these events as it has been previously. Linda did see Dr. Gonsales, however, was not satisfied with her visit there as she was unclear about his plan and did not feel that anything had been accomplished.  Dr. Dai advised that she stop her atenolol as it can cause her epi-pen to be ineffective and and Linda informed him that she wanted to discuss this with her cardiologist.  Linda states that she is still concerned that her poor dental health could be the cause of her events.  She is aware that she needs dentures, however, has not been able to financially pursue this previously.  She notes that during her events when she bites down she has an unusual taste in her mouth and she wonders if this is infection.          ROS:  Review of Systems   HENT: Positive for dental problem, facial swelling and trouble swallowing.    Gastrointestinal: Positive for nausea and vomiting.       Allergies   Allergen Reactions   • Demerol [Meperidine] GI Intolerance   • Chantix [Varenicline] Hallucinations   • Meperidine And Related GI Intolerance   • Latex Rash     Pt states she is not allergic     Past Medical History:   Diagnosis Date   • Anxiety    • Arthritis    • CAD (coronary artery disease)    • CHF (congestive heart failure)    • Depression    • Diabetes mellitus    • HTN (hypertension)    • PONV (postoperative nausea and vomiting)    • Vitamin D deficiency      Past Surgical History:   Procedure Laterality  Date   • APPENDECTOMY     • CARDIAC CATHETERIZATION      [place]   • CORONARY ARTERY BYPASS GRAFT  1992    x 2   • CORONARY STENT PLACEMENT  2014    X 5   • ENDOSCOPY N/A 1/4/2017    Procedure: ESOPHAGOGASTRODUODENOSCOPY WITH ANESTHESIA;  Surgeon: Maico Shelton DO;  Location: [facility] ENDOSCOPY;  Service:    • HERNIA REPAIR     • SHOULDER SURGERY     • TONSILLECTOMY       Family History   Problem Relation Age of Onset   • Breast cancer Mother    • Lung cancer Mother    • No Known Problems Father    • Arrhythmia Maternal Grandmother    • Heart disease Paternal Grandmother    • Diabetes Paternal Grandmother    • Heart disease Paternal Grandfather    • Diabetes Paternal Grandfather    • Lung cancer Maternal Grandfather    • No Known Problems Sister    • No Known Problems Brother    • No Known Problems Daughter    • No Known Problems Son    • No Known Problems Maternal Aunt    • No Known Problems Paternal Aunt    • Colon cancer Neg Hx    • Colon polyps Neg Hx    • Liver cancer Neg Hx    • Liver disease Neg Hx    • BRCA 1/2 Neg Hx    • Endometrial cancer Neg Hx    • Ovarian cancer Neg Hx       reports that she quit smoking about 6 years ago. Her smoking use included Cigarettes. She quit after 35.00 years of use. She has never used smokeless tobacco. She reports that she does not drink alcohol or use drugs.      Current Outpatient Prescriptions:   •  albuterol (PROVENTIL HFA;VENTOLIN HFA) 108 (90 Base) MCG/ACT inhaler, Inhale 2 puffs Every 4 (Four) Hours As Needed for Wheezing., Disp: 1 inhaler, Rfl: 0  •  amLODIPine (NORVASC) 5 MG tablet, Take 1 tablet by mouth Daily., Disp: 90 tablet, Rfl: 2  •  aspirin 81 MG EC tablet, Take 1 tablet by mouth Daily., Disp: 90 tablet, Rfl: 3  •  atenolol (TENORMIN) 50 MG tablet, Take 1 tablet by mouth Daily., Disp: 90 tablet, Rfl: 3  •  atorvastatin (LIPITOR) 40 MG tablet, Take 1 tablet by mouth Daily., Disp: 90 tablet, Rfl: 3  •  baclofen (LIORESAL) 10 MG tablet, , Disp: , Rfl:  "5  •  Blood Glucose Monitoring Suppl (ONE TOUCH ULTRA MINI) W/DEVICE kit, Inject 1 strip under the skin 2 (Two) Times a Day., Disp: 1 each, Rfl: 11  •  budesonide-formoterol (SYMBICORT) 80-4.5 MCG/ACT inhaler, Inhale 2 puffs 2 (Two) Times a Day., Disp: 1 inhaler, Rfl: 12  •  clopidogrel (PLAVIX) 75 MG tablet, Take 1 tablet by mouth Daily., Disp: 90 tablet, Rfl: 3  •  DiphenhydrAMINE HCl, Sleep, 50 MG capsule, Take  by mouth., Disp: , Rfl:   •  Empagliflozin 10 MG tablet, Take 10 mg by mouth Daily., Disp: 30 tablet, Rfl: 5  •  HYDROcodone-acetaminophen (NORCO) 7.5-325 MG per tablet, Take 1 tablet by mouth Daily As Needed for Severe Pain ., Disp: 10 tablet, Rfl: 0  •  lisinopril-hydrochlorothiazide (PRINZIDE,ZESTORETIC) 20-25 MG per tablet, Take 1 tablet by mouth Daily., Disp: 90 tablet, Rfl: 2  •  metFORMIN (GLUCOPHAGE) 500 MG tablet, Take 1 tablet by mouth 2 (Two) Times a Day With Meals., Disp: 180 tablet, Rfl: 2  •  ONE TOUCH ULTRA TEST test strip, 1 each by Other route 3 (Three) Times a Day., Disp: 180 each, Rfl: 1  •  pantoprazole (PROTONIX) 40 MG EC tablet, Take 1 tablet by mouth Daily., Disp: 90 tablet, Rfl: 3  •  promethazine (PHENERGAN) 25 MG tablet, Take 1 tablet by mouth Every 6 (Six) Hours As Needed for Nausea or Vomiting., Disp: 25 tablet, Rfl: 0  •  venlafaxine (EFFEXOR) 75 MG tablet, Take 75 mg by mouth Daily., Disp: , Rfl:     OBJECTIVE:  /79 (BP Location: Right arm, Patient Position: Sitting, Cuff Size: Adult)   Pulse 65   Temp 97.9 °F (36.6 °C) (Oral)   Resp 16   Ht 167.6 cm (66\")   Wt 80 kg (176 lb 4.8 oz)   SpO2 98%   BMI 28.46 kg/m²    Physical Exam    Assessment/Plan    Diagnoses and all orders for this visit:    Pain, dental  -     Ambulatory Referral to Oral Maxillofacial Surgery  Linda does in fact need significant attention to her teeth, though I remain doubtful that this is the cause of her current problems.      Anaphylaxis, subsequent encounter  I have advised Linda to " initiate the Zyrtec BID and Benadryl PRN as suggested by Dr. Gonsales.  She will discuss her atenolol with Dr. Mak on June 4th as I am not comfortable with discontinuing this medication from a cardiac standpoint.  I am hopeful that Dr. Mak may have another option.  All of the food allergy labs were not collected, so I have advised Linda to return to the lab to have this drawn.  She notes that she does not intend to return to Dr. Gonsales's office.  She was not due to return there for two months.  I have reached out to LATASHA Bull at ENT here at [facility] to see if this is something that they would be able to pursue and am awaiting a response from that office at this time.       An After Visit Summary was printed and given to the patient at discharge.  No Follow-up on file.         LATASHA Davis  5/30/2018

## 2018-05-30 NOTE — TELEPHONE ENCOUNTER
Please advise Linda that she doesn't have a follow up on file with us, so she will need to schedule a 3 month appointment.

## 2018-05-31 ENCOUNTER — TELEPHONE (OUTPATIENT)
Dept: INTERNAL MEDICINE | Facility: CLINIC | Age: 68
End: 2018-05-31

## 2018-05-31 NOTE — TELEPHONE ENCOUNTER
Tried calling patient to advise her to schedule 3 month follow-up appointment, no answer.  Left a message for a return phone call.

## 2018-06-04 ENCOUNTER — OFFICE VISIT (OUTPATIENT)
Dept: CARDIOLOGY | Facility: CLINIC | Age: 68
End: 2018-06-04

## 2018-06-04 ENCOUNTER — TELEPHONE (OUTPATIENT)
Dept: INTERNAL MEDICINE | Facility: CLINIC | Age: 68
End: 2018-06-04

## 2018-06-04 VITALS
HEIGHT: 66 IN | WEIGHT: 177 LBS | BODY MASS INDEX: 28.45 KG/M2 | HEART RATE: 71 BPM | SYSTOLIC BLOOD PRESSURE: 123 MMHG | OXYGEN SATURATION: 99 % | DIASTOLIC BLOOD PRESSURE: 60 MMHG

## 2018-06-04 DIAGNOSIS — E78.2 MIXED HYPERLIPIDEMIA: ICD-10-CM

## 2018-06-04 DIAGNOSIS — E04.2 MULTINODULAR GOITER: ICD-10-CM

## 2018-06-04 DIAGNOSIS — Z95.5 STENTED CORONARY ARTERY: ICD-10-CM

## 2018-06-04 DIAGNOSIS — M79.18 CHRONIC MUSCULOSKELETAL PAIN: ICD-10-CM

## 2018-06-04 DIAGNOSIS — E11.42 TYPE 2 DIABETES MELLITUS WITH DIABETIC POLYNEUROPATHY, WITHOUT LONG-TERM CURRENT USE OF INSULIN (HCC): ICD-10-CM

## 2018-06-04 DIAGNOSIS — K08.89 PAIN, DENTAL: ICD-10-CM

## 2018-06-04 DIAGNOSIS — G89.29 CHRONIC MUSCULOSKELETAL PAIN: ICD-10-CM

## 2018-06-04 DIAGNOSIS — Z98.890 HISTORY OF LEFT-SIDED CAROTID ENDARTERECTOMY: ICD-10-CM

## 2018-06-04 DIAGNOSIS — R20.2 FACIAL PARESTHESIA: ICD-10-CM

## 2018-06-04 DIAGNOSIS — R13.19 OTHER DYSPHAGIA: ICD-10-CM

## 2018-06-04 DIAGNOSIS — I65.23 BILATERAL CAROTID ARTERY STENOSIS: ICD-10-CM

## 2018-06-04 DIAGNOSIS — Z95.1 S/P CABG (CORONARY ARTERY BYPASS GRAFT): ICD-10-CM

## 2018-06-04 DIAGNOSIS — Z86.73 HX-TIA (TRANSIENT ISCHEMIC ATTACK): ICD-10-CM

## 2018-06-04 DIAGNOSIS — R06.09 DOE (DYSPNEA ON EXERTION): ICD-10-CM

## 2018-06-04 DIAGNOSIS — F41.9 ANXIETY: ICD-10-CM

## 2018-06-04 DIAGNOSIS — I25.10 CORONARY ARTERY DISEASE INVOLVING NATIVE CORONARY ARTERY OF NATIVE HEART WITHOUT ANGINA PECTORIS: ICD-10-CM

## 2018-06-04 DIAGNOSIS — K21.9 GASTROESOPHAGEAL REFLUX DISEASE WITHOUT ESOPHAGITIS: ICD-10-CM

## 2018-06-04 DIAGNOSIS — I10 ESSENTIAL HYPERTENSION: Primary | ICD-10-CM

## 2018-06-04 PROCEDURE — 99214 OFFICE O/P EST MOD 30 MIN: CPT | Performed by: INTERNAL MEDICINE

## 2018-06-04 PROCEDURE — 93000 ELECTROCARDIOGRAM COMPLETE: CPT | Performed by: INTERNAL MEDICINE

## 2018-06-04 NOTE — TELEPHONE ENCOUNTER
Please advise that I have talked to ENT and they are able to do additional allergy workup there since she was not satisfied with Dr. Gonsales's office. I know she is already a patient of ENT, so she may be able to call and make an appt.  If she isn't, please let us know and I will place a referral since this is for a new problem.  I am considering referring her to [place] for further testing, but we need to have a complete workup here first.

## 2018-06-04 NOTE — PROGRESS NOTES
Linda Pickens  [id number]  1950  68 y.o.  female    Referring Provider: Johny Lehman DO    Reason for Follow-up Visit:  Routine follow up.  coronary artery disease     Subjective      Similar symptoms as during last visit   Overall feeling well   No chest pain  Moderate exertional shortness of breath on exertion relieved with rest  No significant cough or wheezing  Going on for several months  No palpitations  Occasional chest pain that is non exertional  No significant pedal edema  No fever or chills  No significant expectoration  No hemoptysis  No presyncope or syncope   No palpitations  No significant pedal edema  Compliant with medications and dietary advice  Good effort tolerance  No presyncope or syncope  Compliant with medications  Transient numbness of lips   Effort tolerance more limited by orthopedic rather than cardiac related issues       History of present illness:  Linda Pickens is a 68 y.o. yo female with history of coronary artery disease  who presents today for   Chief Complaint   Patient presents with   • Hypertension     6 MON FU    • SURGERY CLEARANCE     DR WHITE PULLING TEETH   .    History  Past Medical History:   Diagnosis Date   • Anxiety    • Arthritis    • CAD (coronary artery disease)    • CHF (congestive heart failure)    • Depression    • Diabetes mellitus    • HTN (hypertension)    • PONV (postoperative nausea and vomiting)    • Vitamin D deficiency    ,   Past Surgical History:   Procedure Laterality Date   • APPENDECTOMY     • CARDIAC CATHETERIZATION      [place]   • CORONARY ARTERY BYPASS GRAFT  1992    x 2   • CORONARY STENT PLACEMENT  2014    X 5   • ENDOSCOPY N/A 1/4/2017    Procedure: ESOPHAGOGASTRODUODENOSCOPY WITH ANESTHESIA;  Surgeon: Maico Shelton DO;  Location: [facility] ENDOSCOPY;  Service:    • HERNIA REPAIR     • SHOULDER SURGERY     • TONSILLECTOMY     ,   Family History   Problem Relation Age of Onset   • Breast cancer Mother    • Lung cancer  Mother    • No Known Problems Father    • Arrhythmia Maternal Grandmother    • Heart disease Paternal Grandmother    • Diabetes Paternal Grandmother    • Heart disease Paternal Grandfather    • Diabetes Paternal Grandfather    • Lung cancer Maternal Grandfather    • No Known Problems Sister    • No Known Problems Brother    • No Known Problems Daughter    • No Known Problems Son    • No Known Problems Maternal Aunt    • No Known Problems Paternal Aunt    • Colon cancer Neg Hx    • Colon polyps Neg Hx    • Liver cancer Neg Hx    • Liver disease Neg Hx    • BRCA 1/2 Neg Hx    • Endometrial cancer Neg Hx    • Ovarian cancer Neg Hx    ,   Social History   Substance Use Topics   • Smoking status: Former Smoker     Years: 35.00     Types: Cigarettes     Quit date: 1/1/2012   • Smokeless tobacco: Never Used   • Alcohol use No   ,     Medications  Current Outpatient Prescriptions   Medication Sig Dispense Refill   • amLODIPine (NORVASC) 5 MG tablet Take 1 tablet by mouth Daily. 90 tablet 2   • aspirin 81 MG EC tablet Take 1 tablet by mouth Daily. 90 tablet 3   • atenolol (TENORMIN) 50 MG tablet Take 1 tablet by mouth Daily. 90 tablet 3   • atorvastatin (LIPITOR) 40 MG tablet Take 1 tablet by mouth Daily. 90 tablet 3   • Blood Glucose Monitoring Suppl (ONE TOUCH ULTRA MINI) W/DEVICE kit Inject 1 strip under the skin 2 (Two) Times a Day. 1 each 11   • clopidogrel (PLAVIX) 75 MG tablet Take 1 tablet by mouth Daily. 90 tablet 3   • DiphenhydrAMINE HCl, Sleep, 50 MG capsule Take  by mouth.     • HYDROcodone-acetaminophen (NORCO) 7.5-325 MG per tablet Take 1 tablet by mouth Daily As Needed for Severe Pain . 10 tablet 0   • lisinopril-hydrochlorothiazide (PRINZIDE,ZESTORETIC) 20-25 MG per tablet Take 1 tablet by mouth Daily. 90 tablet 2   • metFORMIN (GLUCOPHAGE) 500 MG tablet Take 1 tablet by mouth 2 (Two) Times a Day With Meals. 180 tablet 2   • ONE TOUCH ULTRA TEST test strip 1 each by Other route 3 (Three) Times a Day. 180  "each 1   • pantoprazole (PROTONIX) 40 MG EC tablet Take 1 tablet by mouth Daily. 90 tablet 3   • promethazine (PHENERGAN) 25 MG tablet Take 1 tablet by mouth Every 6 (Six) Hours As Needed for Nausea or Vomiting. 25 tablet 0   • venlafaxine (EFFEXOR) 75 MG tablet Take 75 mg by mouth Daily.     • albuterol (PROVENTIL HFA;VENTOLIN HFA) 108 (90 Base) MCG/ACT inhaler Inhale 2 puffs Every 4 (Four) Hours As Needed for Wheezing. 1 inhaler 0   • baclofen (LIORESAL) 10 MG tablet   5   • budesonide-formoterol (SYMBICORT) 80-4.5 MCG/ACT inhaler Inhale 2 puffs 2 (Two) Times a Day. 1 inhaler 12   • Empagliflozin 10 MG tablet Take 10 mg by mouth Daily. 30 tablet 5     No current facility-administered medications for this visit.        Allergies:  Demerol [meperidine]; Chantix [varenicline]; Meperidine and related; and Latex    Review of Systems  Review of Systems   Constitution: Positive for weakness and malaise/fatigue.   HENT: Negative.    Eyes: Negative.    Cardiovascular: Positive for chest pain and dyspnea on exertion. Negative for claudication, cyanosis, irregular heartbeat, leg swelling, near-syncope, orthopnea, palpitations, paroxysmal nocturnal dyspnea and syncope.   Respiratory: Negative.    Endocrine: Negative.    Hematologic/Lymphatic: Negative.    Skin: Negative.    Musculoskeletal: Positive for arthritis and back pain.   Gastrointestinal: Negative for anorexia.   Genitourinary: Negative.    Neurological: Positive for numbness and paresthesias.   Psychiatric/Behavioral: Negative.        Objective     Physical Exam:  /60   Pulse 71   Ht 167.6 cm (65.98\")   Wt 80.3 kg (177 lb)   SpO2 99%   BMI 28.58 kg/m²   Physical Exam   Constitutional: She appears well-developed.   HENT:   Head: Normocephalic.   Neck: Normal carotid pulses and no JVD present. No tracheal tenderness present. Carotid bruit is not present. No tracheal deviation and no edema present.   Cardiovascular: Regular rhythm, normal heart sounds and " normal pulses.    Pulmonary/Chest: Effort normal. No stridor.   Abdominal: Soft.   Neurological: She is alert. She has normal strength. No cranial nerve deficit or sensory deficit.   Skin: Skin is warm.   Psychiatric: She has a normal mood and affect. Her speech is normal and behavior is normal.       Results Review:       ECG 12 Lead  Date/Time: 6/4/2018 11:16 AM  Performed by: INDY PERRY  Authorized by: INDY PERRY   Comparison: compared with previous ECG from 11/28/2017  Similar to previous ECG  Rhythm: sinus rhythm  Rate: normal  Conduction: conduction normal  ST Segments: ST segments normal  T Waves: T waves normal  QRS axis: normal  Other: no other findings  Clinical impression: normal ECG         None    Assessment/Plan   Patient Active Problem List   Diagnosis   • Gastroesophageal reflux disease without esophagitis   • Uncontrolled type 2 diabetes mellitus with complication, without long-term current use of insulin   • Essential hypertension   • Mixed hyperlipidemia   • Peptic ulcer   • CAD (coronary artery disease)   • Anxiety   • Overweight   • Encounter for screening for lung cancer   • Hx of CABG 1992 AND 1993 [facility]   • Multinodular goiter   • RAIN (dyspnea on exertion)   • Hx-TIA (transient ischemic attack)   • Other dysphagia   • History of left-sided carotid endarterectomy   • Bilateral carotid artery stenosis   • Chronic musculoskeletal pain   • Facial paresthesia   • Pain, dental   • Anaphylactic syndrome   • Stented coronary artery x 5 stents last 2014 [place]        Stress echo and echo OK in 3/16    Plan:      Conservative medical therapy per patient. Risks, benefits and alternatives explained. The patient understands and wishes to proceed with only conservative therapy.     Low salt/ HTN/ Heart healthy carbohydrate restricted cardiac diet as applicable to this patient's current diagnoses.   This handout has relevant information regarding shopping for food, preparing meals, what to  "eat at restaurants, tracking of food intake, information regarding sodium intake and salt content, how to read food labels, knowing what to eat, tips reagarding physical activity, calorie count and calorie expenditure. What foods to avoid. Information regarding alcoholic drinks along with \"good\" and \"bad\" fats.  Reiterated prior recommendations     The patient is advised to reduce or avoid caffeine or other cardiac stimulants.     The patient was advised that NSAID-type medications have three  very important potential side effects: cardiovascular complications, gastrointestinal irritation including hemorrhage and renal injuries.  Do not use anti-inflammatories such as Motrin/ibuprofen, Alleve/naprosyn, Mobic and like medications Use Tylenol instead.The patient expresses understanding of these issues and questions were answered.   Monitor for any signs of bleeding including red or dark stools. Fall precautions.       Monitor for any signs of bleeding including red or dark stools. Fall precautions.   Patient is asked to monitor BP at home or work, several times per month and return with written values at next office visit.     Advised staying uptodate with immunizations per established standard guidelines.      Keep A1c less than 7 Primary to monitor  Keep LDL below 70 mg/dl. Monitor liver and renal functions.   Monitor CBC, CMP and Lipid Panel by primary      Offered to give patient  a copy of my notes and relevant tests/ prior ECG etc for the patient to review and follow specific advise and relevant findings if any, prognosis, along with my current and future plans.         Return in about 6 months (around 12/4/2018).                "

## 2018-06-14 ENCOUNTER — OFFICE VISIT (OUTPATIENT)
Dept: INTERNAL MEDICINE | Facility: CLINIC | Age: 68
End: 2018-06-14

## 2018-06-14 VITALS
RESPIRATION RATE: 16 BRPM | DIASTOLIC BLOOD PRESSURE: 74 MMHG | OXYGEN SATURATION: 98 % | HEART RATE: 59 BPM | BODY MASS INDEX: 29.14 KG/M2 | HEIGHT: 66 IN | WEIGHT: 181.3 LBS | SYSTOLIC BLOOD PRESSURE: 138 MMHG

## 2018-06-14 DIAGNOSIS — Z12.2 ENCOUNTER FOR SCREENING FOR LUNG CANCER: ICD-10-CM

## 2018-06-14 DIAGNOSIS — K08.9 POOR DENTITION: ICD-10-CM

## 2018-06-14 DIAGNOSIS — I25.10 CORONARY ARTERY DISEASE INVOLVING NATIVE CORONARY ARTERY OF NATIVE HEART WITHOUT ANGINA PECTORIS: ICD-10-CM

## 2018-06-14 DIAGNOSIS — K02.9 DENTAL CARIES: ICD-10-CM

## 2018-06-14 DIAGNOSIS — E66.3 OVERWEIGHT: ICD-10-CM

## 2018-06-14 DIAGNOSIS — E11.59 TYPE 2 DIABETES MELLITUS WITH OTHER CIRCULATORY COMPLICATION, WITHOUT LONG-TERM CURRENT USE OF INSULIN (HCC): ICD-10-CM

## 2018-06-14 DIAGNOSIS — I10 ESSENTIAL HYPERTENSION: ICD-10-CM

## 2018-06-14 DIAGNOSIS — R20.2 FACIAL PARESTHESIA: Primary | ICD-10-CM

## 2018-06-14 DIAGNOSIS — Z87.891 PERSONAL HISTORY OF NICOTINE DEPENDENCE: ICD-10-CM

## 2018-06-14 PROCEDURE — G0439 PPPS, SUBSEQ VISIT: HCPCS | Performed by: INTERNAL MEDICINE

## 2018-06-14 PROCEDURE — 99214 OFFICE O/P EST MOD 30 MIN: CPT | Performed by: INTERNAL MEDICINE

## 2018-06-14 NOTE — PROGRESS NOTES
QUICK REFERENCE INFORMATION:  The ABCs of the Annual Wellness Visit    Subsequent Medicare Wellness Visit    HEALTH RISK ASSESSMENT    1950    Recent Hospitalizations:  Recently treated at the following:  Other: [facility] for [facility]A.        Current Medical Providers:  Patient Care Team:  Johny Lehman DO as PCP - General (Internal Medicine)  Johny Lehman DO as PCP - Claims Attributed  Carlos Mak MD as Cardiologist (Cardiology)  Eb Capps MD as Consulting Physician (Ophthalmology)  Adam Mullen MD as Consulting Physician (Otolaryngology)  LATASHA Hart as Referring Physician (Nurse Practitioner)  Des Bowser DMD (General Practice)        Smoking Status:  History   Smoking Status   • Former Smoker   • Years: 35.00   • Types: Cigarettes   • Quit date: 1/1/2012   Smokeless Tobacco   • Never Used       Alcohol Consumption:  History   Alcohol Use No       Depression Screen:   PHQ-2/PHQ-9 Depression Screening 6/14/2018   Little interest or pleasure in doing things 0   Feeling down, depressed, or hopeless 0   Trouble falling or staying asleep, or sleeping too much -   Feeling tired or having little energy -   Poor appetite or overeating -   Feeling bad about yourself - or that you are a failure or have let yourself or your family down -   Trouble concentrating on things, such as reading the newspaper or watching television -   Moving or speaking so slowly that other people could have noticed. Or the opposite - being so fidgety or restless that you have been moving around a lot more than usual -   Thoughts that you would be better off dead, or of hurting yourself in some way -   Total Score 0   If you checked off any problems, how difficult have these problems made it for you to do your work, take care of things at home, or get along with other people? -       Health Habits and Functional and Cognitive Screening:  Functional & Cognitive Status 6/14/2018   Do you have difficulty  preparing food and eating? No   Do you have difficulty bathing yourself, getting dressed or grooming yourself? No   Do you have difficulty using the toilet? No   Do you have difficulty moving around from place to place? No   Do you have trouble with steps or getting out of a bed or a chair? No   In the past year have you fallen or experienced a near fall? No   Current Diet Unhealthy Diet   Dental Exam Up to date   Eye Exam Up to date   Exercise (times per week) 0 times per week   Current Exercise Activities Include Walking   Do you need help using the phone?  No   Are you deaf or do you have serious difficulty hearing?  No   Do you need help with transportation? No   Do you need help shopping? No   Do you need help preparing meals?  No   Do you need help with housework?  No   Do you need help with laundry? No   Do you need help taking your medications? No   Do you need help managing money? No   Do you ever drive or ride in a car without wearing a seat belt? No   Have you felt unusual stress, anger or loneliness in the last month? Yes   Who do you live with? Spouse   If you need help, do you have trouble finding someone available to you? No   Have you been bothered in the last four weeks by sexual problems? No   Do you have difficulty concentrating, remembering or making decisions? No           Does the patient have evidence of cognitive impairment? No    Aspirin use counseling: Taking ASA appropriately as indicated      Recent Lab Results:  CMP:  Lab Results   Component Value Date    BUN 18 04/30/2018    CREATININE 0.98 04/30/2018    EGFRIFNONA 57 (L) 04/30/2018    BCR 18.4 04/30/2018     04/30/2018    K 3.9 04/30/2018    CO2 33.0 (H) 04/30/2018    CALCIUM 9.4 04/30/2018    ALBUMIN 3.90 04/30/2018    LABIL2 1.2 04/30/2018    BILITOT 0.8 04/30/2018    ALKPHOS 110 04/30/2018    AST 28 04/30/2018    ALT 26 04/30/2018     Lipid Panel:  Lab Results   Component Value Date    CHOL 189 02/09/2018    TRIG 156 (H)  02/09/2018    HDL 37 (L) 02/09/2018    VLDL 52 (H) 08/02/2016    LDLHDL 3.26 02/09/2018     HbA1c:  Lab Results   Component Value Date    HGBA1C 8.8 04/02/2018       Visual Acuity:  No exam data present    Age-appropriate Screening Schedule:  Refer to the list below for future screening recommendations based on patient's age, sex and/or medical conditions. Orders for these recommended tests are listed in the plan section. The patient has been provided with a written plan.    Health Maintenance   Topic Date Due   • TDAP/TD VACCINES (1 - Tdap) 02/20/1971   • ZOSTER VACCINE (1 of 2) 02/20/2002   • DIABETIC FOOT EXAM  04/21/2018   • INFLUENZA VACCINE  08/01/2018   • HEMOGLOBIN A1C  10/02/2018   • LIPID PANEL  02/09/2019   • DIABETIC EYE EXAM  05/21/2019   • MAMMOGRAM  05/08/2020   • PNEUMOCOCCAL VACCINES (65+ LOW/MEDIUM RISK) (2 of 2 - PPSV23) 03/16/2021   • COLONOSCOPY  04/06/2021   • URINE MICROALBUMIN  Excluded        Subjective   History of Present Illness    Linda A Nelia is a 68 y.o. female who presents for an Subsequent Wellness Visit.    The following portions of the patient's history were reviewed and updated as appropriate: allergies, current medications, past family history, past medical history, past social history, past surgical history and problem list.    Outpatient Medications Prior to Visit   Medication Sig Dispense Refill   • amLODIPine (NORVASC) 5 MG tablet Take 1 tablet by mouth Daily. 90 tablet 2   • aspirin 81 MG EC tablet Take 1 tablet by mouth Daily. 90 tablet 3   • atenolol (TENORMIN) 50 MG tablet Take 1 tablet by mouth Daily. 90 tablet 3   • atorvastatin (LIPITOR) 40 MG tablet Take 1 tablet by mouth Daily. 90 tablet 3   • baclofen (LIORESAL) 10 MG tablet   5   • Blood Glucose Monitoring Suppl (ONE TOUCH ULTRA MINI) W/DEVICE kit Inject 1 strip under the skin 2 (Two) Times a Day. 1 each 11   • clopidogrel (PLAVIX) 75 MG tablet Take 1 tablet by mouth Daily. 90 tablet 3   • DiphenhydrAMINE HCl,  Sleep, 50 MG capsule Take  by mouth.     • HYDROcodone-acetaminophen (NORCO) 7.5-325 MG per tablet Take 1 tablet by mouth Daily As Needed for Severe Pain . 10 tablet 0   • lisinopril-hydrochlorothiazide (PRINZIDE,ZESTORETIC) 20-25 MG per tablet Take 1 tablet by mouth Daily. 90 tablet 2   • metFORMIN (GLUCOPHAGE) 500 MG tablet Take 1 tablet by mouth 2 (Two) Times a Day With Meals. 180 tablet 2   • ONE TOUCH ULTRA TEST test strip 1 each by Other route 3 (Three) Times a Day. 180 each 1   • pantoprazole (PROTONIX) 40 MG EC tablet Take 1 tablet by mouth Daily. 90 tablet 3   • promethazine (PHENERGAN) 25 MG tablet Take 1 tablet by mouth Every 6 (Six) Hours As Needed for Nausea or Vomiting. 25 tablet 0   • venlafaxine (EFFEXOR) 75 MG tablet Take 75 mg by mouth Daily.     • Empagliflozin 10 MG tablet Take 10 mg by mouth Daily. 30 tablet 5   • albuterol (PROVENTIL HFA;VENTOLIN HFA) 108 (90 Base) MCG/ACT inhaler Inhale 2 puffs Every 4 (Four) Hours As Needed for Wheezing. 1 inhaler 0   • budesonide-formoterol (SYMBICORT) 80-4.5 MCG/ACT inhaler Inhale 2 puffs 2 (Two) Times a Day. 1 inhaler 12     No facility-administered medications prior to visit.        Patient Active Problem List   Diagnosis   • Gastroesophageal reflux disease without esophagitis   • Type 2 diabetes mellitus with circulatory disorder, without long-term current use of insulin   • Essential hypertension   • Mixed hyperlipidemia   • Peptic ulcer   • CAD (coronary artery disease)   • Anxiety   • Overweight   • Encounter for screening for lung cancer   • Hx of CABG 1992 AND 1993 [facility]   • Multinodular goiter   • RAIN (dyspnea on exertion)   • Hx-TIA (transient ischemic attack)   • Other dysphagia   • History of left-sided carotid endarterectomy   • Bilateral carotid artery stenosis   • Chronic musculoskeletal pain   • Facial paresthesia   • Pain, dental   • Anaphylactic syndrome   • Stented coronary artery x 5 stents last 2014 [place]        Advance  "Care Planning:  has NO advance directive - information provided to the patient today    Identification of Risk Factors:  Risk factors include: weight , unhealthy diet, cardiovascular risk and inactivity.    Review of Systems See  note    Compared to one year ago, the patient feels her physical health is worse.  Compared to one year ago, the patient feels her mental health is the same.    Objective     Physical Exam See  note    Vitals:    06/14/18 1311   BP: 138/74   BP Location: Left arm   Patient Position: Sitting   Cuff Size: Adult   Pulse: 59   Resp: 16   SpO2: 98%   Weight: 82.2 kg (181 lb 4.8 oz)   Height: 167.6 cm (65.98\")       Patient's Body mass index is 29.28 kg/m². BMI is above normal parameters. Recommendations include: exercise counseling and nutrition counseling.      Assessment/Plan   Patient Self-Management and Personalized Health Advice  The patient has been provided with information about: diet, exercise, weight management and designing advance directives and preventive services including:   · Advance directive, Exercise counseling provided, Fall Risk assessment done, Influenza vaccine, TdaP vaccine, Zostavax vaccine (Herpes Zoster).    Visit Diagnoses:    ICD-10-CM ICD-9-CM   1. Coronary artery disease involving native coronary artery of native heart without angina pectoris I25.10 414.01   2. Essential hypertension I10 401.9   3. Overweight E66.3 278.02   4. Type 2 diabetes mellitus with other circulatory complication, without long-term current use of insulin E11.59 250.70   5. Encounter for screening for lung cancer Z12.2 V76.0   6. Personal history of nicotine dependence  Z87.891 V15.82       Orders Placed This Encounter   Procedures   • CT Chest Low Dose Wo     Standing Status:   Future     Standing Expiration Date:   6/14/2019       Outpatient Encounter Prescriptions as of 6/14/2018   Medication Sig Dispense Refill   • amLODIPine (NORVASC) 5 MG tablet Take 1 tablet by mouth " Daily. 90 tablet 2   • aspirin 81 MG EC tablet Take 1 tablet by mouth Daily. 90 tablet 3   • atenolol (TENORMIN) 50 MG tablet Take 1 tablet by mouth Daily. 90 tablet 3   • atorvastatin (LIPITOR) 40 MG tablet Take 1 tablet by mouth Daily. 90 tablet 3   • baclofen (LIORESAL) 10 MG tablet   5   • Blood Glucose Monitoring Suppl (ONE TOUCH ULTRA MINI) W/DEVICE kit Inject 1 strip under the skin 2 (Two) Times a Day. 1 each 11   • clopidogrel (PLAVIX) 75 MG tablet Take 1 tablet by mouth Daily. 90 tablet 3   • DiphenhydrAMINE HCl, Sleep, 50 MG capsule Take  by mouth.     • HYDROcodone-acetaminophen (NORCO) 7.5-325 MG per tablet Take 1 tablet by mouth Daily As Needed for Severe Pain . 10 tablet 0   • lisinopril-hydrochlorothiazide (PRINZIDE,ZESTORETIC) 20-25 MG per tablet Take 1 tablet by mouth Daily. 90 tablet 2   • metFORMIN (GLUCOPHAGE) 500 MG tablet Take 1 tablet by mouth 2 (Two) Times a Day With Meals. 180 tablet 2   • ONE TOUCH ULTRA TEST test strip 1 each by Other route 3 (Three) Times a Day. 180 each 1   • pantoprazole (PROTONIX) 40 MG EC tablet Take 1 tablet by mouth Daily. 90 tablet 3   • promethazine (PHENERGAN) 25 MG tablet Take 1 tablet by mouth Every 6 (Six) Hours As Needed for Nausea or Vomiting. 25 tablet 0   • venlafaxine (EFFEXOR) 75 MG tablet Take 75 mg by mouth Daily.     • Empagliflozin 10 MG tablet Take 10 mg by mouth Daily. 30 tablet 5   • [DISCONTINUED] albuterol (PROVENTIL HFA;VENTOLIN HFA) 108 (90 Base) MCG/ACT inhaler Inhale 2 puffs Every 4 (Four) Hours As Needed for Wheezing. 1 inhaler 0   • [DISCONTINUED] budesonide-formoterol (SYMBICORT) 80-4.5 MCG/ACT inhaler Inhale 2 puffs 2 (Two) Times a Day. 1 inhaler 12     No facility-administered encounter medications on file as of 6/14/2018.        Reviewed use of high risk medication in the elderly: yes  Reviewed for potential of harmful drug interactions in the elderly: yes    Follow Up:  Return in about 3 months (around 9/14/2018) for Recheck.     An  After Visit Summary and PPPS with all of these plans were given to the patient.

## 2018-06-14 NOTE — PATIENT INSTRUCTIONS
Medicare Wellness  Personal Prevention Plan of Service     Date of Office Visit:  2018  Encounter Provider:  Johny Lehman DO  Place of Service:  [facility]  Patient Name: Linda Pickens  :  1950    As part of the Medicare Wellness portion of your visit today, we are providing you with this personalized preventive plan of services (PPPS). This plan is based upon recommendations of the [place] Preventive Services Task Force (USPSTF) and the Advisory Committee on Immunization Practices (ACIP).    This lists the preventive care services that should be considered, and provides dates of when you are due. Items listed as completed are up-to-date and do not require any further intervention.    Health Maintenance   Topic Date Due   • TDAP/TD VACCINES (1 - Tdap) 1971   • ZOSTER VACCINE (1 of 2) 2002   • DIABETIC FOOT EXAM  2018   • INFLUENZA VACCINE  2018   • HEMOGLOBIN A1C  10/02/2018   • LIPID PANEL  2019   • DIABETIC EYE EXAM  2019   • MEDICARE ANNUAL WELLNESS  2019   • MAMMOGRAM  2020   • PNEUMOCOCCAL VACCINES (65+ LOW/MEDIUM RISK) (2 of 2 - PPSV23) 2021   • COLONOSCOPY  2021   • HEPATITIS C SCREENING  Completed   • URINE MICROALBUMIN  Excluded       No orders of the defined types were placed in this encounter.      Return for Recheck.

## 2018-06-15 NOTE — PROGRESS NOTES
CC: Follow-up carotid endarterectomy    History:  Linda Pickens is a 68 y.o. female who presents today for follow-up for evaluation of the above:  She reports she has been doing reasonably well and has recovered well from her carotid endarterectomy, though she does continue to have some facial numbness that concerns her.  She notes it is in her mouth additionally and she does carry an EpiPen secondary to concern for anaphylaxis, though she does not have to use this regularly.  She has visited with an allergist, but workup was largely negative.  Her symptoms did not improve after carotid endarterectomy.  She notes her blood pressure has done well on her current medications without side effects.  She also tolerates her medications for diabetes and denies symptoms nor readings of hyperglycemia or hypoglycemia.    ROS:  Review of Systems   Constitutional: Negative for chills and fever.   Respiratory: Negative for cough and shortness of breath.    Cardiovascular: Negative for chest pain and palpitations.   Gastrointestinal: Negative for abdominal pain, constipation and nausea.   Neurological: Positive for numbness. Negative for headaches.       Ms. Pickens  reports that she quit smoking about 6 years ago. Her smoking use included Cigarettes. She quit after 35.00 years of use. She has never used smokeless tobacco. She reports that she does not drink alcohol or use drugs.      Current Outpatient Prescriptions:   •  amLODIPine (NORVASC) 5 MG tablet, Take 1 tablet by mouth Daily., Disp: 90 tablet, Rfl: 2  •  aspirin 81 MG EC tablet, Take 1 tablet by mouth Daily., Disp: 90 tablet, Rfl: 3  •  atenolol (TENORMIN) 50 MG tablet, Take 1 tablet by mouth Daily., Disp: 90 tablet, Rfl: 3  •  atorvastatin (LIPITOR) 40 MG tablet, Take 1 tablet by mouth Daily., Disp: 90 tablet, Rfl: 3  •  baclofen (LIORESAL) 10 MG tablet, , Disp: , Rfl: 5  •  Blood Glucose Monitoring Suppl (ONE TOUCH ULTRA MINI) W/DEVICE kit, Inject 1 strip under the skin  "2 (Two) Times a Day., Disp: 1 each, Rfl: 11  •  clopidogrel (PLAVIX) 75 MG tablet, Take 1 tablet by mouth Daily., Disp: 90 tablet, Rfl: 3  •  DiphenhydrAMINE HCl, Sleep, 50 MG capsule, Take  by mouth., Disp: , Rfl:   •  HYDROcodone-acetaminophen (NORCO) 7.5-325 MG per tablet, Take 1 tablet by mouth Daily As Needed for Severe Pain ., Disp: 10 tablet, Rfl: 0  •  lisinopril-hydrochlorothiazide (PRINZIDE,ZESTORETIC) 20-25 MG per tablet, Take 1 tablet by mouth Daily., Disp: 90 tablet, Rfl: 2  •  metFORMIN (GLUCOPHAGE) 500 MG tablet, Take 1 tablet by mouth 2 (Two) Times a Day With Meals., Disp: 180 tablet, Rfl: 2  •  ONE TOUCH ULTRA TEST test strip, 1 each by Other route 3 (Three) Times a Day., Disp: 180 each, Rfl: 1  •  pantoprazole (PROTONIX) 40 MG EC tablet, Take 1 tablet by mouth Daily., Disp: 90 tablet, Rfl: 3  •  promethazine (PHENERGAN) 25 MG tablet, Take 1 tablet by mouth Every 6 (Six) Hours As Needed for Nausea or Vomiting., Disp: 25 tablet, Rfl: 0  •  venlafaxine (EFFEXOR) 75 MG tablet, Take 75 mg by mouth Daily., Disp: , Rfl:   •  Empagliflozin 10 MG tablet, Take 10 mg by mouth Daily., Disp: 30 tablet, Rfl: 5      OBJECTIVE:  /74 (BP Location: Left arm, Patient Position: Sitting, Cuff Size: Adult)   Pulse 59   Resp 16   Ht 167.6 cm (65.98\")   Wt 82.2 kg (181 lb 4.8 oz)   SpO2 98%   BMI 29.28 kg/m²    Physical Exam   Constitutional: She is oriented to person, place, and time. She appears well-nourished. No distress.   Cardiovascular: Normal rate, regular rhythm and normal heart sounds.    No murmur heard.  Pulmonary/Chest: Effort normal and breath sounds normal. She has no wheezes.   Neurological: She is alert and oriented to person, place, and time.   Psychiatric: She has a normal mood and affect.       Assessment/Plan    Diagnoses and all orders for this visit:    Facial paresthesia  This did not resolve after carotid endarterectomy.  I believe there could be an element of oral allergy syndrome, " though she does not note it is present with certain foods only.  We will continue to monitor and if this gets worse consider further evaluation.    Coronary artery disease involving native coronary artery of native heart without angina pectoris  She remained stable without anginal symptoms on aspirin, Plavix, statin, and beta blocker.    Essential hypertension  Well controlled, BP goal for age is <140/90 per JNC 8 guidelines and continue current medications    Overweight  Recommended attention to portion control and being careful about the types and timing of meals for the purpose of weight management.    Type 2 diabetes mellitus with other circulatory complication, without long-term current use of insulin  A1c was 8.8% in April 2018.  Meds were changed at a time and we will plan on recheck on follow-up.  She had not started Jardiance, so she is encouraged to do that at this time.    Encounter for screening for lung cancer  -     CT Chest Low Dose Wo; Future    Personal history of nicotine dependence   -     CT Chest Low Dose Wo; Future    Dental caries  Poor denition  She has significant vascular disease with history of CAD, carotid disease and other known cardiovascular disease. There is a link between periodontal disease and cardiovascular disease, so I believe it would be in her best medical interest to prevent complications to have her dental work addressed by a dentist or oral surgeon.     An After Visit Summary was printed and given to the patient at discharge.  Return in about 3 months (around 9/14/2018) for Recheck. Sooner if problems arise.         Johny Lehman D.O. 6/15/2018

## 2018-06-19 ENCOUNTER — HOSPITAL ENCOUNTER (OUTPATIENT)
Dept: CT IMAGING | Facility: HOSPITAL | Age: 68
Discharge: HOME | End: 2018-06-19
Attending: INTERNAL MEDICINE | Admitting: INTERNAL MEDICINE

## 2018-06-19 DIAGNOSIS — Z12.2 ENCOUNTER FOR SCREENING FOR LUNG CANCER: ICD-10-CM

## 2018-06-19 DIAGNOSIS — Z87.891 PERSONAL HISTORY OF NICOTINE DEPENDENCE: ICD-10-CM

## 2018-06-19 PROCEDURE — G0297 LDCT FOR LUNG CA SCREEN: HCPCS

## 2018-07-16 ENCOUNTER — DOCUMENTATION (OUTPATIENT)
Dept: ULTRASOUND IMAGING | Facility: HOSPITAL | Age: 68
End: 2018-07-16

## 2018-07-16 NOTE — PROGRESS NOTES
Kamila's voicemail with Dr. Lehman notified of Category 2 results of low dose CT chest and recommendation per radiologist for a repeat annual screening.

## 2018-07-17 ENCOUNTER — TELEPHONE (OUTPATIENT)
Dept: INTERNAL MEDICINE | Facility: CLINIC | Age: 68
End: 2018-07-17

## 2018-07-17 NOTE — TELEPHONE ENCOUNTER
Patient called and ask if you have completed her letter for .  I did not see anything about it in her chart.  ANNA MARIE

## 2018-08-02 DIAGNOSIS — I10 ESSENTIAL HYPERTENSION: ICD-10-CM

## 2018-08-02 DIAGNOSIS — E11.42 TYPE 2 DIABETES MELLITUS WITH DIABETIC POLYNEUROPATHY, WITHOUT LONG-TERM CURRENT USE OF INSULIN (HCC): ICD-10-CM

## 2018-08-02 DIAGNOSIS — K21.00 GASTROESOPHAGEAL REFLUX DISEASE WITH ESOPHAGITIS: ICD-10-CM

## 2018-08-02 DIAGNOSIS — I20.9 ANGINA PECTORIS (HCC): ICD-10-CM

## 2018-08-02 DIAGNOSIS — I25.708 CORONARY ARTERY DISEASE OF BYPASS GRAFT OF NATIVE HEART WITH STABLE ANGINA PECTORIS (HCC): ICD-10-CM

## 2018-08-02 DIAGNOSIS — E78.2 MIXED HYPERLIPIDEMIA: ICD-10-CM

## 2018-08-02 RX ORDER — VENLAFAXINE 75 MG/1
75 TABLET ORAL DAILY
Qty: 90 TABLET | Refills: 3 | Status: SHIPPED | OUTPATIENT
Start: 2018-08-02 | End: 2018-08-15 | Stop reason: SDUPTHER

## 2018-08-02 RX ORDER — PANTOPRAZOLE SODIUM 40 MG/1
40 TABLET, DELAYED RELEASE ORAL DAILY
Qty: 90 TABLET | Refills: 3 | Status: SHIPPED | OUTPATIENT
Start: 2018-08-02 | End: 2019-09-17 | Stop reason: SDUPTHER

## 2018-08-02 RX ORDER — ATENOLOL 50 MG/1
50 TABLET ORAL DAILY
Qty: 90 TABLET | Refills: 3 | Status: SHIPPED | OUTPATIENT
Start: 2018-08-02 | End: 2018-08-03 | Stop reason: SDUPTHER

## 2018-08-02 RX ORDER — CLOPIDOGREL BISULFATE 75 MG/1
75 TABLET ORAL DAILY
Qty: 90 TABLET | Refills: 3 | Status: SHIPPED | OUTPATIENT
Start: 2018-08-02 | End: 2018-08-03 | Stop reason: SDUPTHER

## 2018-08-02 RX ORDER — ATORVASTATIN CALCIUM 40 MG/1
40 TABLET, FILM COATED ORAL DAILY
Qty: 90 TABLET | Refills: 3 | Status: SHIPPED | OUTPATIENT
Start: 2018-08-02 | End: 2018-08-15 | Stop reason: SDUPTHER

## 2018-08-02 RX ORDER — LISINOPRIL AND HYDROCHLOROTHIAZIDE 25; 20 MG/1; MG/1
1 TABLET ORAL DAILY
Qty: 90 TABLET | Refills: 3 | Status: SHIPPED | OUTPATIENT
Start: 2018-08-02 | End: 2018-09-19

## 2018-08-02 RX ORDER — AMLODIPINE BESYLATE 5 MG/1
5 TABLET ORAL DAILY
Qty: 90 TABLET | Refills: 3 | Status: SHIPPED | OUTPATIENT
Start: 2018-08-02 | End: 2019-01-22 | Stop reason: SDUPTHER

## 2018-08-03 ENCOUNTER — TELEPHONE (OUTPATIENT)
Dept: INTERNAL MEDICINE | Facility: CLINIC | Age: 68
End: 2018-08-03

## 2018-08-03 DIAGNOSIS — I10 ESSENTIAL HYPERTENSION: ICD-10-CM

## 2018-08-03 DIAGNOSIS — I25.708 CORONARY ARTERY DISEASE OF BYPASS GRAFT OF NATIVE HEART WITH STABLE ANGINA PECTORIS (HCC): ICD-10-CM

## 2018-08-03 DIAGNOSIS — I20.9 ANGINA PECTORIS (HCC): ICD-10-CM

## 2018-08-03 RX ORDER — ATENOLOL 50 MG/1
50 TABLET ORAL DAILY
Qty: 10 TABLET | Refills: 0 | Status: SHIPPED | OUTPATIENT
Start: 2018-08-03 | End: 2018-09-19 | Stop reason: SDUPTHER

## 2018-08-03 RX ORDER — CLOPIDOGREL BISULFATE 75 MG/1
75 TABLET ORAL DAILY
Qty: 10 TABLET | Refills: 0 | Status: SHIPPED | OUTPATIENT
Start: 2018-08-03 | End: 2018-09-19 | Stop reason: SDUPTHER

## 2018-08-03 NOTE — TELEPHONE ENCOUNTER
Patient called to ask if we can CALL IN #10 of her Atenolol and her Plavix to [facility] [place] pharmacy to get her by until her 90 days supplies come form [employer] mail order please.   MM

## 2018-08-14 ENCOUNTER — PATIENT MESSAGE (OUTPATIENT)
Dept: INTERNAL MEDICINE | Facility: CLINIC | Age: 68
End: 2018-08-14

## 2018-08-14 DIAGNOSIS — E78.2 MIXED HYPERLIPIDEMIA: ICD-10-CM

## 2018-08-15 RX ORDER — VENLAFAXINE 50 MG/1
50 TABLET ORAL 2 TIMES DAILY
Qty: 180 TABLET | Refills: 3 | Status: SHIPPED | OUTPATIENT
Start: 2018-08-15 | End: 2019-09-27 | Stop reason: SDUPTHER

## 2018-08-15 RX ORDER — VENLAFAXINE 50 MG/1
50 TABLET ORAL 2 TIMES DAILY
Qty: 180 TABLET | Refills: 3 | Status: SHIPPED | OUTPATIENT
Start: 2018-08-15 | End: 2018-08-15 | Stop reason: SDUPTHER

## 2018-08-15 RX ORDER — ATORVASTATIN CALCIUM 40 MG/1
40 TABLET, FILM COATED ORAL DAILY
Qty: 90 TABLET | Refills: 3 | Status: SHIPPED | OUTPATIENT
Start: 2018-08-15 | End: 2019-09-17 | Stop reason: SDUPTHER

## 2018-08-15 RX ORDER — ATORVASTATIN CALCIUM 40 MG/1
40 TABLET, FILM COATED ORAL DAILY
Qty: 90 TABLET | Refills: 3 | Status: SHIPPED | OUTPATIENT
Start: 2018-08-15 | End: 2018-08-15 | Stop reason: SDUPTHER

## 2018-08-15 NOTE — TELEPHONE ENCOUNTER
From: Linda JOHNSON [facility]  To: Johny Lehman DO  Sent: 8/14/2018 8:38 PM CDT  Subject: Prescription Question    Hello,   I received my prescription from [facility]. The Venlafaxine was 75 mg once a day. My prescription changed back to 50 mg 2 x day several months ago. Can I take the 75 mg 2 x daily? If not please put new script for the 50 mg 2 x daily. They for some reason didn’t send my Atorvastain 40 mg 1 x daily so would you please resubmit it.   Thank you,   Linda

## 2018-09-19 ENCOUNTER — TELEPHONE (OUTPATIENT)
Dept: INTERNAL MEDICINE | Facility: CLINIC | Age: 68
End: 2018-09-19

## 2018-09-19 ENCOUNTER — OFFICE VISIT (OUTPATIENT)
Dept: INTERNAL MEDICINE | Facility: CLINIC | Age: 68
End: 2018-09-19

## 2018-09-19 VITALS
HEART RATE: 58 BPM | HEIGHT: 66 IN | SYSTOLIC BLOOD PRESSURE: 139 MMHG | DIASTOLIC BLOOD PRESSURE: 75 MMHG | WEIGHT: 174 LBS | RESPIRATION RATE: 16 BRPM | OXYGEN SATURATION: 96 % | BODY MASS INDEX: 27.97 KG/M2

## 2018-09-19 DIAGNOSIS — Z23 ENCOUNTER FOR IMMUNIZATION: ICD-10-CM

## 2018-09-19 DIAGNOSIS — Z86.73 HX-TIA (TRANSIENT ISCHEMIC ATTACK): ICD-10-CM

## 2018-09-19 DIAGNOSIS — E11.59 TYPE 2 DIABETES MELLITUS WITH OTHER CIRCULATORY COMPLICATION, WITHOUT LONG-TERM CURRENT USE OF INSULIN (HCC): Primary | ICD-10-CM

## 2018-09-19 DIAGNOSIS — E66.3 OVERWEIGHT: ICD-10-CM

## 2018-09-19 DIAGNOSIS — E78.2 MIXED HYPERLIPIDEMIA: ICD-10-CM

## 2018-09-19 DIAGNOSIS — E11.42 TYPE 2 DIABETES MELLITUS WITH DIABETIC POLYNEUROPATHY, WITHOUT LONG-TERM CURRENT USE OF INSULIN (HCC): ICD-10-CM

## 2018-09-19 DIAGNOSIS — L30.9 DERMATITIS: ICD-10-CM

## 2018-09-19 DIAGNOSIS — I20.9 ANGINA PECTORIS (HCC): ICD-10-CM

## 2018-09-19 DIAGNOSIS — I25.708 CORONARY ARTERY DISEASE OF BYPASS GRAFT OF NATIVE HEART WITH STABLE ANGINA PECTORIS (HCC): ICD-10-CM

## 2018-09-19 DIAGNOSIS — I10 ESSENTIAL HYPERTENSION: ICD-10-CM

## 2018-09-19 PROBLEM — R20.2 FACIAL PARESTHESIA: Status: RESOLVED | Noted: 2018-03-19 | Resolved: 2018-09-19

## 2018-09-19 PROBLEM — T78.2XXA ANAPHYLACTIC SYNDROME: Status: RESOLVED | Noted: 2018-05-30 | Resolved: 2018-09-19

## 2018-09-19 PROBLEM — M54.81 OCCIPITAL NEURALGIA OF RIGHT SIDE: Status: ACTIVE | Noted: 2018-09-19

## 2018-09-19 PROBLEM — K08.89 PAIN, DENTAL: Status: RESOLVED | Noted: 2018-05-30 | Resolved: 2018-09-19

## 2018-09-19 LAB — HBA1C MFR BLD: 7.9 %

## 2018-09-19 PROCEDURE — 99214 OFFICE O/P EST MOD 30 MIN: CPT | Performed by: INTERNAL MEDICINE

## 2018-09-19 PROCEDURE — 83036 HEMOGLOBIN GLYCOSYLATED A1C: CPT | Performed by: INTERNAL MEDICINE

## 2018-09-19 PROCEDURE — 90471 IMMUNIZATION ADMIN: CPT | Performed by: INTERNAL MEDICINE

## 2018-09-19 PROCEDURE — 90662 IIV NO PRSV INCREASED AG IM: CPT | Performed by: INTERNAL MEDICINE

## 2018-09-19 RX ORDER — CLOPIDOGREL BISULFATE 75 MG/1
75 TABLET ORAL DAILY
Qty: 90 TABLET | Refills: 3 | Status: SHIPPED | OUTPATIENT
Start: 2018-09-19 | End: 2018-09-19 | Stop reason: SDUPTHER

## 2018-09-19 RX ORDER — ATENOLOL 50 MG/1
50 TABLET ORAL DAILY
Qty: 90 TABLET | Refills: 3 | Status: SHIPPED | OUTPATIENT
Start: 2018-09-19 | End: 2019-09-17 | Stop reason: SDUPTHER

## 2018-09-19 RX ORDER — LOSARTAN POTASSIUM AND HYDROCHLOROTHIAZIDE 25; 100 MG/1; MG/1
1 TABLET ORAL DAILY
Qty: 90 TABLET | Refills: 3 | Status: SHIPPED | OUTPATIENT
Start: 2018-09-19 | End: 2019-09-17 | Stop reason: SDUPTHER

## 2018-09-19 RX ORDER — CLOPIDOGREL BISULFATE 75 MG/1
75 TABLET ORAL DAILY
Qty: 90 TABLET | Refills: 3 | Status: SHIPPED | OUTPATIENT
Start: 2018-09-19 | End: 2019-09-17 | Stop reason: SDUPTHER

## 2018-09-19 RX ORDER — BLOOD-GLUCOSE METER
1 KIT MISCELLANEOUS 2 TIMES DAILY
Qty: 1 EACH | Refills: 11 | Status: SHIPPED | OUTPATIENT
Start: 2018-09-19

## 2018-09-19 RX ORDER — ATENOLOL 50 MG/1
50 TABLET ORAL DAILY
Qty: 90 TABLET | Refills: 3 | Status: SHIPPED | OUTPATIENT
Start: 2018-09-19 | End: 2018-09-19 | Stop reason: SDUPTHER

## 2018-09-19 RX ORDER — NITROGLYCERIN 400 UG/1
1 SPRAY ORAL
Qty: 4.9 G | Refills: 12 | Status: SHIPPED | OUTPATIENT
Start: 2018-09-19

## 2018-09-19 RX ORDER — LOSARTAN POTASSIUM AND HYDROCHLOROTHIAZIDE 25; 100 MG/1; MG/1
1 TABLET ORAL DAILY
Qty: 30 TABLET | Refills: 5 | Status: SHIPPED | OUTPATIENT
Start: 2018-09-19 | End: 2018-09-19 | Stop reason: SDUPTHER

## 2018-09-19 NOTE — TELEPHONE ENCOUNTER
Patient's AVS printed out that her One Touch Ultra Test was being sent to [facility] and patient states it is supposed to be sent through [facility] [facility] as well.

## 2018-09-19 NOTE — PROGRESS NOTES
"CC: cough    History:  Linda Pickens is a 68 y.o. female who presents today for follow-up for evaluation of the above:  She reports she has been doing reasonably well without any acute illness.  However, she has a chronic cough that has been there for about 6 months.  She has a sensation of something being stuck in her throat.  She has mild subjective voice changes, but has had no hoarseness or sore throat.  She also has right-sided neck and occipital pain that she feels is deep and aching in quality.  She is currently seeing orthopedic Sturgeon for evaluation and they are discussing injections.  Her sugars have been running \"high,\" though she has not started Jardiance to date.  Blood pressure is well controlled on her current medications without side effects.  She continues on aspirin, beta blocker, and statin related to a history of CAD and has not required the use of her nitroglycerin recently.      ROS:  Review of Systems   Constitutional: Negative for chills, fatigue and fever.   HENT: Negative for congestion and sore throat.    Respiratory: Positive for cough. Negative for shortness of breath.    Cardiovascular: Negative for chest pain, palpitations and leg swelling.   Musculoskeletal: Positive for neck pain.   Neurological: Positive for headaches.       Ms. Pickens  reports that she quit smoking about 6 years ago. Her smoking use included Cigarettes. She quit after 35.00 years of use. She has never used smokeless tobacco. She reports that she does not drink alcohol or use drugs.      Current Outpatient Prescriptions:   •  amLODIPine (NORVASC) 5 MG tablet, Take 1 tablet by mouth Daily., Disp: 90 tablet, Rfl: 3  •  aspirin 81 MG EC tablet, Take 1 tablet by mouth Daily., Disp: 90 tablet, Rfl: 3  •  atenolol (TENORMIN) 50 MG tablet, Take 1 tablet by mouth Daily., Disp: 90 tablet, Rfl: 3  •  atorvastatin (LIPITOR) 40 MG tablet, Take 1 tablet by mouth Daily., Disp: 90 tablet, Rfl: 3  •  baclofen (LIORESAL) 10 MG " "tablet, , Disp: , Rfl: 5  •  Blood Glucose Monitoring Suppl (ONE TOUCH ULTRA MINI) W/DEVICE kit, Inject 1 strip under the skin 2 (Two) Times a Day., Disp: 1 each, Rfl: 11  •  clopidogrel (PLAVIX) 75 MG tablet, Take 1 tablet by mouth Daily., Disp: 90 tablet, Rfl: 3  •  DiphenhydrAMINE HCl, Sleep, 50 MG capsule, Take  by mouth., Disp: , Rfl:   •  Empagliflozin 10 MG tablet, Take 10 mg by mouth Daily., Disp: 30 tablet, Rfl: 5  •  HYDROcodone-acetaminophen (NORCO) 7.5-325 MG per tablet, Take 1 tablet by mouth Daily As Needed for Severe Pain ., Disp: 10 tablet, Rfl: 0  •  lisinopril-hydrochlorothiazide (PRINZIDE,ZESTORETIC) 20-25 MG per tablet, Take 1 tablet by mouth Daily., Disp: 90 tablet, Rfl: 3  •  metFORMIN (GLUCOPHAGE) 500 MG tablet, Take 1 tablet by mouth 2 (Two) Times a Day With Meals., Disp: 180 tablet, Rfl: 3  •  ONE TOUCH ULTRA TEST test strip, 1 each by Other route 3 (Three) Times a Day., Disp: 180 each, Rfl: 11  •  pantoprazole (PROTONIX) 40 MG EC tablet, Take 1 tablet by mouth Daily., Disp: 90 tablet, Rfl: 3  •  promethazine (PHENERGAN) 25 MG tablet, Take 1 tablet by mouth Every 6 (Six) Hours As Needed for Nausea or Vomiting., Disp: 25 tablet, Rfl: 0  •  venlafaxine (EFFEXOR) 50 MG tablet, Take 1 tablet by mouth 2 (Two) Times a Day., Disp: 180 tablet, Rfl: 3      OBJECTIVE:  /75 (BP Location: Left arm, Patient Position: Sitting, Cuff Size: Adult)   Pulse 58   Resp 16   Ht 167.6 cm (65.98\")   Wt 78.9 kg (174 lb)   SpO2 96%   Breastfeeding? No   BMI 28.10 kg/m²    Physical Exam   Constitutional: She is oriented to person, place, and time. She appears well-nourished. No distress.   Cardiovascular: Normal rate, regular rhythm and normal heart sounds.    No murmur heard.  Pulmonary/Chest: Effort normal and breath sounds normal. She has no wheezes.   Neurological: She is alert and oriented to person, place, and time.   Psychiatric: She has a normal mood and affect.       Assessment/Plan    Diagnoses " and all orders for this visit:    Type 2 diabetes mellitus with other circulatory complication, without long-term current use of insulin (CMS/[facility])  Type 2 diabetes mellitus with diabetic polyneuropathy, without long-term current use of insulin (CMS/[facility])  -     ONE TOUCH ULTRA TEST test strip; 1 each by Other route 3 (Three) Times a Day.  -     POC Glycosylated Hemoglobin (Hb A1C)  A1c is 7.9%.  This represents an improvement and she should start Jardiance in addition to her existing medications.  She has had a yearly eye exam, continues on an arm, and statin.    Essential hypertension  -     atenolol (TENORMIN) 50 MG tablet; Take 1 tablet by mouth Daily.  -     clopidogrel (PLAVIX) 75 MG tablet; Take 1 tablet by mouth Daily.  -     losartan-hydrochlorothiazide (HYZAAR) 100-25 MG per tablet; Take 1 tablet by mouth Daily.  Well controlled, BP goal for age is <140/90 per JNC 8 guidelines and continue current medications    Coronary artery disease of bypass graft of native heart with stable angina pectoris (CMS/[facility])  -     nitroglycerin (NITROLINGUAL) 0.4 MG/SPRAY spray; Place 1 spray under the tongue Every 5 (Five) Minutes As Needed for Chest Pain.  -     clopidogrel (PLAVIX) 75 MG tablet; Take 1 tablet by mouth Daily.  She remained stable on statin, aspirin, Plavix, and beta blocker.  She has nitroglycerin as needed, but has not required the use of this recently.  Prescription is sent due to expiration.    Hx-TIA (transient ischemic attack)  Stable with aspirin and statin.    Mixed hyperlipidemia  Stable on high intensity statin therapy per ACC/AHA guidelines.    Overweight  Recommended attention to portion control and being careful about the types and timing of meals for the purpose of weight management.    Dermatitis  -     fluocinonide (LIDEX) 0.05 % cream; Apply  topically to the appropriate area as directed Every 12 (Twelve) Hours.  Lidex to rash over elbows. There is no plaque-like component.     Encounter for  immunization  -     Flu Vaccine High Dose PF 65YR+ ([id number])    An After Visit Summary was printed and given to the patient at discharge.  Return in about 3 months (around 12/19/2018) for Recheck. Sooner if problems arise.         Johny Lehman D.O. 9/19/2018

## 2018-09-20 DIAGNOSIS — E11.59 TYPE 2 DIABETES MELLITUS WITH OTHER CIRCULATORY COMPLICATION, WITHOUT LONG-TERM CURRENT USE OF INSULIN (HCC): ICD-10-CM

## 2018-09-21 ENCOUNTER — TELEPHONE (OUTPATIENT)
Dept: INTERNAL MEDICINE | Facility: CLINIC | Age: 68
End: 2018-09-21

## 2018-09-21 DIAGNOSIS — E11.59 TYPE 2 DIABETES MELLITUS WITH OTHER CIRCULATORY COMPLICATION, WITHOUT LONG-TERM CURRENT USE OF INSULIN (HCC): ICD-10-CM

## 2018-09-25 RX ORDER — FLUCONAZOLE 150 MG/1
150 TABLET ORAL ONCE
Qty: 1 TABLET | Refills: 0 | Status: SHIPPED | OUTPATIENT
Start: 2018-09-25 | End: 2018-09-25

## 2018-09-25 NOTE — TELEPHONE ENCOUNTER
Patient called and stated she has developed a yeast infection after starting Jardiance a week ago.  Patient has not been on any antibiotics recently.  Patient would like to know if something can be prescribed.      Dr. Lehman informed.

## 2018-10-26 ENCOUNTER — OFFICE VISIT (OUTPATIENT)
Dept: INTERNAL MEDICINE | Facility: CLINIC | Age: 68
End: 2018-10-26

## 2018-10-26 VITALS
WEIGHT: 174.19 LBS | HEART RATE: 56 BPM | BODY MASS INDEX: 27.99 KG/M2 | TEMPERATURE: 98 F | OXYGEN SATURATION: 97 % | SYSTOLIC BLOOD PRESSURE: 138 MMHG | HEIGHT: 66 IN | RESPIRATION RATE: 12 BRPM | DIASTOLIC BLOOD PRESSURE: 82 MMHG

## 2018-10-26 DIAGNOSIS — E11.59 TYPE 2 DIABETES MELLITUS WITH OTHER CIRCULATORY COMPLICATION, WITHOUT LONG-TERM CURRENT USE OF INSULIN (HCC): ICD-10-CM

## 2018-10-26 DIAGNOSIS — T78.2XXS ANAPHYLAXIS, SEQUELA: Primary | ICD-10-CM

## 2018-10-26 PROCEDURE — 99214 OFFICE O/P EST MOD 30 MIN: CPT | Performed by: NURSE PRACTITIONER

## 2018-10-26 RX ORDER — CETIRIZINE HYDROCHLORIDE 10 MG/1
10 TABLET ORAL DAILY
Qty: 60 TABLET | Refills: 2 | Status: SHIPPED | OUTPATIENT
Start: 2018-10-26 | End: 2019-01-22

## 2018-10-26 RX ORDER — CETIRIZINE HYDROCHLORIDE 10 MG/1
10 TABLET ORAL DAILY
Qty: 60 TABLET | Refills: 2 | Status: SHIPPED | OUTPATIENT
Start: 2018-10-26 | End: 2018-10-26 | Stop reason: SDUPTHER

## 2018-10-26 NOTE — PROGRESS NOTES
CC: anaphylaxis    History:  Linda Pickens is a 68 y.o. female who presents today for evaluation of the above problems.    Continues to have episodes of anaphylaxis. This past Sunday her face swelled so severely her lips turned inside out and her eyes would not open.  Also had emesis for 8 1/2 hours.  Her dentist believes that she may have a salivary stone.  Prior to this she had gone two months without any event, however, she is very concerned due to increased severity of this most recent episode.   She was unable to take Jardiance due to yeast infections and notes that her sugar has been running in the 200-300 range at home.  She does need more testing strips today as well.   She has been seen previously by Dr. Gonsales, however, she would prefer to see another provider.        ROS:  Review of Systems   Constitutional: Negative for fever.   HENT: Positive for facial swelling and trouble swallowing.    Gastrointestinal: Positive for nausea and vomiting.       Allergies   Allergen Reactions   • Demerol [Meperidine] GI Intolerance   • Chantix [Varenicline] Hallucinations   • Meperidine And Related GI Intolerance   • Latex Rash     Pt states she is not allergic     Past Medical History:   Diagnosis Date   • Anxiety    • Arthritis    • CAD (coronary artery disease)    • CHF (congestive heart failure) (CMS/HCC)    • Depression    • Diabetes mellitus (CMS/HCC)    • HTN (hypertension)    • PONV (postoperative nausea and vomiting)    • Vitamin D deficiency      Past Surgical History:   Procedure Laterality Date   • APPENDECTOMY     • CARDIAC CATHETERIZATION      [place]   • CORONARY ARTERY BYPASS GRAFT  1992    x 2   • CORONARY STENT PLACEMENT  2014    X 5   • ENDOSCOPY N/A 1/4/2017    Procedure: ESOPHAGOGASTRODUODENOSCOPY WITH ANESTHESIA;  Surgeon: Maico Shelton DO;  [facility] ENDOSCOPY;  Service:    • HERNIA REPAIR     • SHOULDER SURGERY     • TONSILLECTOMY       Family History   Problem Relation Age of  Onset   • Breast cancer Mother    • Lung cancer Mother    • No Known Problems Father    • Arrhythmia Maternal Grandmother    • Heart disease Paternal Grandmother    • Diabetes Paternal Grandmother    • Heart disease Paternal Grandfather    • Diabetes Paternal Grandfather    • Lung cancer Maternal Grandfather    • No Known Problems Sister    • No Known Problems Brother    • No Known Problems Daughter    • No Known Problems Son    • No Known Problems Maternal Aunt    • No Known Problems Paternal Aunt    • Colon cancer Neg Hx    • Colon polyps Neg Hx    • Liver cancer Neg Hx    • Liver disease Neg Hx    • BRCA 1/2 Neg Hx    • Endometrial cancer Neg Hx    • Ovarian cancer Neg Hx       reports that she quit smoking about 6 years ago. Her smoking use included Cigarettes. She quit after 35.00 years of use. She has never used smokeless tobacco. She reports that she does not drink alcohol or use drugs.      Current Outpatient Prescriptions:   •  amLODIPine (NORVASC) 5 MG tablet, Take 1 tablet by mouth Daily., Disp: 90 tablet, Rfl: 3  •  aspirin 81 MG EC tablet, Take 1 tablet by mouth Daily., Disp: 90 tablet, Rfl: 3  •  atenolol (TENORMIN) 50 MG tablet, Take 1 tablet by mouth Daily., Disp: 90 tablet, Rfl: 3  •  atorvastatin (LIPITOR) 40 MG tablet, Take 1 tablet by mouth Daily., Disp: 90 tablet, Rfl: 3  •  Blood Glucose Monitoring Suppl (ONE TOUCH ULTRA MINI) w/Device kit, Inject 1 strip under the skin into the appropriate area as directed 2 (Two) Times a Day., Disp: 1 each, Rfl: 11  •  clopidogrel (PLAVIX) 75 MG tablet, Take 1 tablet by mouth Daily., Disp: 90 tablet, Rfl: 3  •  DiphenhydrAMINE HCl, Sleep, 50 MG capsule, Take  by mouth., Disp: , Rfl:   •  fluocinonide (LIDEX) 0.05 % cream, Apply  topically to the appropriate area as directed Every 12 (Twelve) Hours., Disp: 30 g, Rfl: 1  •  HYDROcodone-acetaminophen (NORCO) 7.5-325 MG per tablet, Take 1 tablet by mouth Daily As Needed for Severe Pain ., Disp: 10 tablet, Rfl:  "0  •  metFORMIN (GLUCOPHAGE) 500 MG tablet, Take 1 tablet by mouth 2 (Two) Times a Day With Meals., Disp: 180 tablet, Rfl: 3  •  ONE TOUCH ULTRA TEST test strip, 1 each by Other route 3 (Three) Times a Day., Disp: 180 each, Rfl: 3  •  pantoprazole (PROTONIX) 40 MG EC tablet, Take 1 tablet by mouth Daily., Disp: 90 tablet, Rfl: 3  •  promethazine (PHENERGAN) 25 MG tablet, Take 1 tablet by mouth Every 6 (Six) Hours As Needed for Nausea or Vomiting., Disp: 25 tablet, Rfl: 0  •  venlafaxine (EFFEXOR) 50 MG tablet, Take 1 tablet by mouth 2 (Two) Times a Day., Disp: 180 tablet, Rfl: 3  •  cetirizine (zyrTEC) 10 MG tablet, Take 1 tablet by mouth Daily., Disp: 60 tablet, Rfl: 2  •  losartan-hydrochlorothiazide (HYZAAR) 100-25 MG per tablet, Take 1 tablet by mouth Daily., Disp: 90 tablet, Rfl: 3  •  nitroglycerin (NITROLINGUAL) 0.4 MG/SPRAY spray, Place 1 spray under the tongue Every 5 (Five) Minutes As Needed for Chest Pain., Disp: 4.9 g, Rfl: 12  •  SITagliptin (JANUVIA) 50 MG tablet, Take 1 tablet by mouth Daily., Disp: 30 tablet, Rfl: 2  •  SITagliptin (JANUVIA) 50 MG tablet, Take 1 tablet by mouth Daily., Disp: 30 tablet, Rfl: 0    OBJECTIVE:  /82 (BP Location: Left arm, Patient Position: Sitting, Cuff Size: Adult)   Pulse 56   Temp 98 °F (36.7 °C) (Oral)   Resp 12   Ht 167.6 cm (65.98\")   Wt 79 kg (174 lb 3 oz)   SpO2 97%   BMI 28.13 kg/m²    Physical Exam   Constitutional: She is oriented to person, place, and time. Vital signs are normal. She appears well-developed and well-nourished.   Cardiovascular: Normal rate.    Pulmonary/Chest: Effort normal.   Neurological: She is alert and oriented to person, place, and time.   Psychiatric: She has a normal mood and affect. Her behavior is normal.   Vitals reviewed.      Assessment/Plan    Linda was seen today for difficulty swallowing and facial swelling.    Diagnoses and all orders for this visit:    Anaphylaxis, sequela  -     Ambulatory Referral to " Allergy  -     Discontinue: cetirizine (zyrTEC) 10 MG tablet; Take 1 tablet by mouth Daily.  -     cetirizine (zyrTEC) 10 MG tablet; Take 1 tablet by mouth Daily.  Will refer to allergist at [facility] since we have been unable alleviate her episodes of anaphylaxis.  I have advised that she take BID zyrtec in addition to the benadryl that she already takes to help prevent future events.  She does have an epi-pen at home.     Type 2 diabetes mellitus with other circulatory complication, without long-term current use of insulin (CMS/[facility])  -     SITagliptin (JANUVIA) 50 MG tablet; Take 1 tablet by mouth Daily.  -     SITagliptin (JANUVIA) 50 MG tablet; Take 1 tablet by mouth Daily.  -     ONE TOUCH ULTRA TEST test strip; 1 each by Other route 3 (Three) Times a Day.  Will replace jardiance with Januvia due to intolerance and she will keep her appointment with Dr. Lehman in December.  If tolerated could consider changing to janumet to alleviate pill burden.       An After Visit Summary was printed and given to the patient at discharge.  Return in about 3 months (around 1/26/2019) for Next scheduled follow up.       LATASHA Davis  10/26/2018

## 2018-11-13 NOTE — H&P (VIEW-ONLY)
Topics    Smoking status: Former Smoker     Quit date: 12/26/2011    Smokeless tobacco: Never Used    Alcohol use No       Review of Systems    Constitutional  no significant activity change, appetite change, or unexpected weight change. No fever or chills. No diaphoresis or significant fatigue. HENT  no significant rhinorrhea or epistaxis. No tinnitus or significant hearing loss. Eyes  no sudden vision change or amaurosis. Respiratory  no significant shortness of breath, wheezing, or stridor. No apnea, cough, or chest tightness associated with shortness of breath. Cardiovascular  no chest pain, syncope, or significant dizziness. No palpitations or significant leg swelling. No claudication. Gastrointestinal  no abdominal swelling or pain. No blood in stool. No severe constipation, diarrhea, nausea, or vomiting. Genitourinary  No difficulty urinating, dysuria, frequency, or urgency. No flank pain or hematuria. Musculoskeletal  no back pain, gait disturbance, or myalgia. Skin  no color change, rash, pallor, or new wound. Neurologic  no dizziness, facial asymmetry, or light headedness. No seizures. No, AF,  lateralizing weakness. ( see HPI)  Hematologic  no easy bruising or excessive bleeding. Psychiatric  no severe anxiety or nervousness. No confusion. All other review of systems are negative. Physical Exam    /72 (Site: Left Arm, Position: Sitting, Cuff Size: Medium Adult)   Pulse 53   Temp 97.2 °F (36.2 °C) (Temporal)   Resp 18   SpO2 94%     Constitutional  well developed, well nourished. No diaphoresis or acute distress. HENT  head normocephalic. Right external ear canal appears normal.  Left external ear canal appears normal.  Septum appears midline. Eyes  conjunctiva normal.  EOMS normal.  No exudate. No icterus. Neck- ROM appears normal, no tracheal deviation. Cardiovascular  Regular rate and rhythm.   Heart sounds are normal.  No murmur, rub, or 97.1

## 2018-12-18 ENCOUNTER — OFFICE VISIT (OUTPATIENT)
Dept: INTERNAL MEDICINE | Facility: CLINIC | Age: 68
End: 2018-12-18

## 2018-12-18 VITALS
WEIGHT: 178.9 LBS | RESPIRATION RATE: 16 BRPM | HEIGHT: 66 IN | BODY MASS INDEX: 28.75 KG/M2 | HEART RATE: 59 BPM | SYSTOLIC BLOOD PRESSURE: 132 MMHG | DIASTOLIC BLOOD PRESSURE: 74 MMHG

## 2018-12-18 DIAGNOSIS — E11.59 TYPE 2 DIABETES MELLITUS WITH OTHER CIRCULATORY COMPLICATION, WITHOUT LONG-TERM CURRENT USE OF INSULIN (HCC): Primary | ICD-10-CM

## 2018-12-18 DIAGNOSIS — I25.10 CORONARY ARTERY DISEASE INVOLVING NATIVE CORONARY ARTERY OF NATIVE HEART WITHOUT ANGINA PECTORIS: ICD-10-CM

## 2018-12-18 DIAGNOSIS — I10 ESSENTIAL HYPERTENSION: ICD-10-CM

## 2018-12-18 DIAGNOSIS — E66.3 OVERWEIGHT: ICD-10-CM

## 2018-12-18 DIAGNOSIS — Z95.5 STENTED CORONARY ARTERY: ICD-10-CM

## 2018-12-18 DIAGNOSIS — Z86.73 HX-TIA (TRANSIENT ISCHEMIC ATTACK): ICD-10-CM

## 2018-12-18 LAB — HBA1C MFR BLD: 8.2 %

## 2018-12-18 PROCEDURE — 83036 HEMOGLOBIN GLYCOSYLATED A1C: CPT | Performed by: INTERNAL MEDICINE

## 2018-12-18 PROCEDURE — 99214 OFFICE O/P EST MOD 30 MIN: CPT | Performed by: INTERNAL MEDICINE

## 2018-12-18 NOTE — PROGRESS NOTES
CC: Follow-up diabetes    History:  Linda Pickens is a 68 y.o. female who presents today for follow-up for evaluation of the above:  She notes she has done well without any acute illness.  She has no anginal symptoms and does continue on aspirin, Plavix, beta blocker, and statin.  Her blood pressure has remained well-controlled on her current medications.  Mood has been well-controlled on Effexor without any exacerbation.  She does continue on metformin alone.  She was unable to tolerate Jardiance secondary to vaginal yeast and had not started her Januvia.    ROS:  Review of Systems   Constitutional: Negative for chills and fever.   Respiratory: Negative for cough and shortness of breath.    Cardiovascular: Negative for chest pain and palpitations.   Gastrointestinal: Negative for abdominal pain, constipation and nausea.       Ms. Pickens  reports that she quit smoking about 6 years ago. Her smoking use included cigarettes. She quit after 35.00 years of use. she has never used smokeless tobacco. She reports that she does not drink alcohol or use drugs.      Current Outpatient Medications:   •  amLODIPine (NORVASC) 5 MG tablet, Take 1 tablet by mouth Daily., Disp: 90 tablet, Rfl: 3  •  aspirin 81 MG EC tablet, Take 1 tablet by mouth Daily., Disp: 90 tablet, Rfl: 3  •  atenolol (TENORMIN) 50 MG tablet, Take 1 tablet by mouth Daily., Disp: 90 tablet, Rfl: 3  •  atorvastatin (LIPITOR) 40 MG tablet, Take 1 tablet by mouth Daily., Disp: 90 tablet, Rfl: 3  •  Blood Glucose Monitoring Suppl (ONE TOUCH ULTRA MINI) w/Device kit, Inject 1 strip under the skin into the appropriate area as directed 2 (Two) Times a Day., Disp: 1 each, Rfl: 11  •  cetirizine (zyrTEC) 10 MG tablet, Take 1 tablet by mouth Daily., Disp: 60 tablet, Rfl: 2  •  clopidogrel (PLAVIX) 75 MG tablet, Take 1 tablet by mouth Daily., Disp: 90 tablet, Rfl: 3  •  DiphenhydrAMINE HCl, Sleep, 50 MG capsule, Take  by mouth., Disp: , Rfl:   •  fluocinonide (LIDEX) 0.05  "% cream, Apply  topically to the appropriate area as directed Every 12 (Twelve) Hours., Disp: 30 g, Rfl: 1  •  HYDROcodone-acetaminophen (NORCO) 7.5-325 MG per tablet, Take 1 tablet by mouth Daily As Needed for Severe Pain ., Disp: 10 tablet, Rfl: 0  •  losartan-hydrochlorothiazide (HYZAAR) 100-25 MG per tablet, Take 1 tablet by mouth Daily., Disp: 90 tablet, Rfl: 3  •  metFORMIN (GLUCOPHAGE) 500 MG tablet, Take 1 tablet by mouth 2 (Two) Times a Day With Meals., Disp: 180 tablet, Rfl: 3  •  nitroglycerin (NITROLINGUAL) 0.4 MG/SPRAY spray, Place 1 spray under the tongue Every 5 (Five) Minutes As Needed for Chest Pain., Disp: 4.9 g, Rfl: 12  •  ONE TOUCH ULTRA TEST test strip, 1 each by Other route 3 (Three) Times a Day., Disp: 180 each, Rfl: 3  •  pantoprazole (PROTONIX) 40 MG EC tablet, Take 1 tablet by mouth Daily., Disp: 90 tablet, Rfl: 3  •  promethazine (PHENERGAN) 25 MG tablet, Take 1 tablet by mouth Every 6 (Six) Hours As Needed for Nausea or Vomiting., Disp: 25 tablet, Rfl: 0  •  venlafaxine (EFFEXOR) 50 MG tablet, Take 1 tablet by mouth 2 (Two) Times a Day., Disp: 180 tablet, Rfl: 3      OBJECTIVE:  /74 (BP Location: Left arm, Patient Position: Sitting, Cuff Size: Adult)   Pulse 59   Resp 16   Ht 167.6 cm (65.98\")   Wt 81.1 kg (178 lb 14.4 oz)   Breastfeeding? No   BMI 28.89 kg/m²    Physical Exam   Constitutional: She is oriented to person, place, and time. She appears well-nourished. No distress.   Cardiovascular: Normal rate, regular rhythm and normal heart sounds.   No murmur heard.  Pulmonary/Chest: Effort normal and breath sounds normal. She has no wheezes.   Neurological: She is alert and oriented to person, place, and time.   Psychiatric: She has a normal mood and affect.       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  A1c is 8.2% in the office.  This represents stability overall, we will plan to intensify therapy to Janumet.  She is given 2 weeks of samples and prescription will be sent through to her mail order pharmacy.  She is on an ARB and statin.  Diabetic eye exam is up-to-date.    Coronary artery disease involving native coronary artery of native heart without angina pectoris  Stented coronary artery x 5 stents last 2014 [place]   She remains without angina on aspirin, Plavix, beta blocker, and statin. Last LDL is 123. Consider Repatha when cost lowers in 2019.     Essential hypertension  Well controlled, BP goal for age is <140/90 per JNC 8 guidelines and continue current medications    Overweight  Recommended attention to portion control and being careful about the types and timing of meals for the purpose of weight management.    Hx-TIA (transient ischemic attack)  No new symptoms. Continue ASA, Plavix, and statin.      An After Visit Summary was printed and given to the patient at discharge.  Return in about 3 months (around 3/18/2019) for Recheck. Sooner if problems arise.         Johny Lehman D.O. 12/18/2018

## 2019-01-21 ENCOUNTER — CLINICAL SUPPORT (OUTPATIENT)
Dept: INTERNAL MEDICINE | Facility: CLINIC | Age: 69
End: 2019-01-21

## 2019-01-21 VITALS
BODY MASS INDEX: 28.61 KG/M2 | DIASTOLIC BLOOD PRESSURE: 73 MMHG | RESPIRATION RATE: 16 BRPM | HEIGHT: 66 IN | OXYGEN SATURATION: 98 % | SYSTOLIC BLOOD PRESSURE: 159 MMHG | WEIGHT: 178 LBS

## 2019-01-21 DIAGNOSIS — I10 ESSENTIAL HYPERTENSION: Primary | ICD-10-CM

## 2019-01-21 PROCEDURE — 99024 POSTOP FOLLOW-UP VISIT: CPT | Performed by: INTERNAL MEDICINE

## 2019-01-21 NOTE — PROGRESS NOTES
"Chief complaint: F/u hypertension with BP Check    History:  Linda Pickens is a 68 y.o. female who presents today for BP check for hypertension.    OBJECTIVE:  /73 (BP Location: Right arm, Patient Position: Sitting, Cuff Size: Adult)   Resp 16   Ht 167.6 cm (65.98\")   Wt 80.7 kg (178 lb)   SpO2 98%   Breastfeeding? No   BMI 28.75 kg/m²     Assessment/Plan    Diagnoses and all orders for this visit:    Essential hypertension    Patient was advised to remain on the same medications return if need to.       Johny Lehman D.O. 1/21/2019   "

## 2019-01-22 ENCOUNTER — OFFICE VISIT (OUTPATIENT)
Dept: INTERNAL MEDICINE | Facility: CLINIC | Age: 69
End: 2019-01-22

## 2019-01-22 ENCOUNTER — TELEPHONE (OUTPATIENT)
Dept: INTERNAL MEDICINE | Facility: CLINIC | Age: 69
End: 2019-01-22

## 2019-01-22 VITALS
HEART RATE: 63 BPM | HEIGHT: 66 IN | OXYGEN SATURATION: 97 % | WEIGHT: 147.6 LBS | RESPIRATION RATE: 18 BRPM | DIASTOLIC BLOOD PRESSURE: 78 MMHG | SYSTOLIC BLOOD PRESSURE: 148 MMHG | BODY MASS INDEX: 23.72 KG/M2 | TEMPERATURE: 98.4 F

## 2019-01-22 DIAGNOSIS — F41.8 SITUATIONAL ANXIETY: Primary | ICD-10-CM

## 2019-01-22 DIAGNOSIS — I10 ESSENTIAL HYPERTENSION: ICD-10-CM

## 2019-01-22 PROCEDURE — 99214 OFFICE O/P EST MOD 30 MIN: CPT | Performed by: NURSE PRACTITIONER

## 2019-01-22 RX ORDER — AMLODIPINE BESYLATE 5 MG/1
10 TABLET ORAL DAILY
Qty: 90 TABLET | Refills: 3 | Status: SHIPPED | OUTPATIENT
Start: 2019-01-22

## 2019-01-22 NOTE — TELEPHONE ENCOUNTER
Attempted to call patient. LM to return call.   I spoke with Dr. Lehman about her b/p. At this time lets increase her amlodipine to 10 mg and she can come in for a nurse visit in two weeks for a b/p check.   Her b/p remains right on the boarder of where we would like it and increasing this may improve this.   For her situational anxiety related to procedures- the dentist can prescribe her a one time dose of medication for anxiety to take before her procedure if they feel this is still necessary.

## 2019-01-22 NOTE — PROGRESS NOTES
CC: f/u hypertension    History:  Linda Pickens is a 68 y.o. female who presents today for follow-up for evaluation of the above:  Patient presents today for f/u on hypertension. She was seen in the office yesterday for blood pressure check as well with readings remaining fairly controlled. She reports that she has been seeing elevated reading on her machine at home. She also reports very elevated readings prior to having dental procedures done and her dentist recommended she f/u with her PCP. We did review her medications as she is concerned she may not be taking them correctly but her bottles match our records.   Dentist- Dr. Kessler. JUDD Rodriguez    ROS:  Review of Systems   Constitutional: Negative for fatigue and unexpected weight change.   HENT: Negative.    Eyes: Negative.    Respiratory: Negative.    Cardiovascular: Negative.    Gastrointestinal: Negative for abdominal pain, constipation and diarrhea.   Endocrine: Negative.    Genitourinary: Negative for difficulty urinating, dyspareunia, genital sores, menstrual problem, pelvic pain, vaginal bleeding, vaginal discharge and vaginal pain.   Musculoskeletal: Negative.    Skin: Negative.    Neurological: Negative.    Psychiatric/Behavioral: The patient is nervous/anxious.        Ms. Pickens  reports that she quit smoking about 7 years ago. Her smoking use included cigarettes. She quit after 35.00 years of use. she has never used smokeless tobacco. She reports that she does not drink alcohol or use drugs.      Current Outpatient Medications:   •  amLODIPine (NORVASC) 5 MG tablet, Take 1 tablet by mouth Daily., Disp: 90 tablet, Rfl: 3  •  aspirin 81 MG EC tablet, Take 1 tablet by mouth Daily., Disp: 90 tablet, Rfl: 3  •  atenolol (TENORMIN) 50 MG tablet, Take 1 tablet by mouth Daily., Disp: 90 tablet, Rfl: 3  •  atorvastatin (LIPITOR) 40 MG tablet, Take 1 tablet by mouth Daily., Disp: 90 tablet, Rfl: 3  •  Blood Glucose Monitoring Suppl (ONE TOUCH ULTRA MINI) w/Device  "kit, Inject 1 strip under the skin into the appropriate area as directed 2 (Two) Times a Day., Disp: 1 each, Rfl: 11  •  clopidogrel (PLAVIX) 75 MG tablet, Take 1 tablet by mouth Daily., Disp: 90 tablet, Rfl: 3  •  DiphenhydrAMINE HCl, Sleep, 50 MG capsule, Take  by mouth., Disp: , Rfl:   •  fluocinonide (LIDEX) 0.05 % cream, Apply  topically to the appropriate area as directed Every 12 (Twelve) Hours., Disp: 30 g, Rfl: 1  •  HYDROcodone-acetaminophen (NORCO) 7.5-325 MG per tablet, Take 1 tablet by mouth Daily As Needed for Severe Pain ., Disp: 10 tablet, Rfl: 0  •  losartan-hydrochlorothiazide (HYZAAR) 100-25 MG per tablet, Take 1 tablet by mouth Daily., Disp: 90 tablet, Rfl: 3  •  metFORMIN (GLUCOPHAGE) 500 MG tablet, Take 1 tablet by mouth 2 (Two) Times a Day With Meals., Disp: 180 tablet, Rfl: 3  •  nitroglycerin (NITROLINGUAL) 0.4 MG/SPRAY spray, Place 1 spray under the tongue Every 5 (Five) Minutes As Needed for Chest Pain., Disp: 4.9 g, Rfl: 12  •  ONE TOUCH ULTRA TEST test strip, 1 each by Other route 3 (Three) Times a Day., Disp: 180 each, Rfl: 3  •  pantoprazole (PROTONIX) 40 MG EC tablet, Take 1 tablet by mouth Daily., Disp: 90 tablet, Rfl: 3  •  venlafaxine (EFFEXOR) 50 MG tablet, Take 1 tablet by mouth 2 (Two) Times a Day., Disp: 180 tablet, Rfl: 3  •  SITagliptin-MetFORMIN HCl ER (JANUMET XR) 100-1000 MG tablet, Take 1 tablet by mouth Daily., Disp: 90 tablet, Rfl: 3      OBJECTIVE:  /78 (BP Location: Left arm, Patient Position: Sitting)   Pulse 63   Temp 98.4 °F (36.9 °C) (Oral)   Resp 18   Ht 167.6 cm (66\")   Wt 67 kg (147 lb 9.6 oz) Comment: 174.6lb  SpO2 97%   BMI 23.82 kg/m²    Physical Exam   Constitutional: She is oriented to person, place, and time. She appears well-developed and well-nourished.   HENT:   Head: Normocephalic and atraumatic.   Eyes: EOM are normal. Pupils are equal, round, and reactive to light.   Neck: Normal range of motion. Neck supple.   Cardiovascular: Normal " rate, regular rhythm and normal heart sounds.   Pulmonary/Chest: Effort normal and breath sounds normal.   Abdominal: Soft. Bowel sounds are normal.   Musculoskeletal: Normal range of motion.   Neurological: She is alert and oriented to person, place, and time.   Skin: Skin is warm and dry.   Psychiatric: She has a normal mood and affect.   Vitals reviewed.      Assessment/Plan    Linda was seen today for hypertension.    Diagnoses and all orders for this visit:    Situational anxiety  Patient plans to have dental procedures in two weeks for tooth extraction. Advised that her dentist may provide her with a one time dose of medication for her anxiety related to this procedure if he feels this is necessary for her care.     Essential hypertension  -     amLODIPine (NORVASC) 5 MG tablet; Take 2 tablets by mouth Daily.  fairly controlled, BP goal for age is <140/90 per JNC 8 guidelines. Will increase amlodipine to 10 mg daily.     An After Visit Summary was printed and given to the patient at discharge.  Return for 2 weeks nurse only visit for b/p check. Sooner if problems arise.         Myla PAGAN. 1/22/2019

## 2019-03-18 ENCOUNTER — OFFICE VISIT (OUTPATIENT)
Dept: INTERNAL MEDICINE | Facility: CLINIC | Age: 69
End: 2019-03-18

## 2019-03-18 VITALS
BODY MASS INDEX: 27.97 KG/M2 | WEIGHT: 174.02 LBS | HEART RATE: 68 BPM | SYSTOLIC BLOOD PRESSURE: 136 MMHG | RESPIRATION RATE: 16 BRPM | HEIGHT: 66 IN | DIASTOLIC BLOOD PRESSURE: 78 MMHG | OXYGEN SATURATION: 97 %

## 2019-03-18 DIAGNOSIS — I25.10 CORONARY ARTERY DISEASE INVOLVING NATIVE CORONARY ARTERY OF NATIVE HEART WITHOUT ANGINA PECTORIS: ICD-10-CM

## 2019-03-18 DIAGNOSIS — Z95.5 STENTED CORONARY ARTERY: ICD-10-CM

## 2019-03-18 DIAGNOSIS — K21.9 GASTROESOPHAGEAL REFLUX DISEASE WITHOUT ESOPHAGITIS: ICD-10-CM

## 2019-03-18 DIAGNOSIS — I10 ESSENTIAL HYPERTENSION: ICD-10-CM

## 2019-03-18 DIAGNOSIS — E11.59 TYPE 2 DIABETES MELLITUS WITH OTHER CIRCULATORY COMPLICATION, WITHOUT LONG-TERM CURRENT USE OF INSULIN (HCC): Primary | ICD-10-CM

## 2019-03-18 DIAGNOSIS — Z95.1 HX OF CABG: ICD-10-CM

## 2019-03-18 DIAGNOSIS — Z87.891 PERSONAL HISTORY OF NICOTINE DEPENDENCE: ICD-10-CM

## 2019-03-18 DIAGNOSIS — Z86.73 HX-TIA (TRANSIENT ISCHEMIC ATTACK): ICD-10-CM

## 2019-03-18 DIAGNOSIS — E66.3 OVERWEIGHT: ICD-10-CM

## 2019-03-18 PROBLEM — T78.2XXA ANAPHYLACTIC SYNDROME: Status: RESOLVED | Noted: 2018-05-30 | Resolved: 2019-03-18

## 2019-03-18 PROBLEM — R13.19 OTHER DYSPHAGIA: Status: RESOLVED | Noted: 2017-12-13 | Resolved: 2019-03-18

## 2019-03-18 PROCEDURE — 99214 OFFICE O/P EST MOD 30 MIN: CPT | Performed by: INTERNAL MEDICINE

## 2019-03-18 NOTE — PROGRESS NOTES
CC: f/u diabetes    History:  Linda Pickens is a 69 y.o. female   She reports she has been doing well and is very pleased to report she had her problematic teeth extracted.  She has felt much better, but is waiting on her denture to come in.  She has had ongoing hyperglycemia, but is not overtly symptomatic.  She is due for blood work.  Her blood pressure has done well on current medication without side effects.  She does deny anginal symptoms and continues on aspirin, beta-blocker, and statin as well as Plavix given history of CAD and history of stroke.    ROS:  Review of Systems   Constitutional: Negative for chills and fever.   Respiratory: Negative for cough and shortness of breath.    Cardiovascular: Negative for chest pain and palpitations.   Gastrointestinal: Negative for abdominal pain, constipation and nausea.        reports that she quit smoking about 7 years ago. Her smoking use included cigarettes. She quit after 35.00 years of use. she has never used smokeless tobacco. She reports that she does not drink alcohol or use drugs.      Current Outpatient Medications:   •  amLODIPine (NORVASC) 5 MG tablet, Take 2 tablets by mouth Daily., Disp: 90 tablet, Rfl: 3  •  aspirin 81 MG EC tablet, Take 1 tablet by mouth Daily., Disp: 90 tablet, Rfl: 3  •  atenolol (TENORMIN) 50 MG tablet, Take 1 tablet by mouth Daily., Disp: 90 tablet, Rfl: 3  •  atorvastatin (LIPITOR) 40 MG tablet, Take 1 tablet by mouth Daily., Disp: 90 tablet, Rfl: 3  •  Blood Glucose Monitoring Suppl (ONE TOUCH ULTRA MINI) w/Device kit, Inject 1 strip under the skin into the appropriate area as directed 2 (Two) Times a Day., Disp: 1 each, Rfl: 11  •  clopidogrel (PLAVIX) 75 MG tablet, Take 1 tablet by mouth Daily., Disp: 90 tablet, Rfl: 3  •  DiphenhydrAMINE HCl, Sleep, 50 MG capsule, Take  by mouth., Disp: , Rfl:   •  fluocinonide (LIDEX) 0.05 % cream, Apply  topically to the appropriate area as directed Every 12 (Twelve) Hours., Disp: 30 g,  "Rfl: 1  •  HYDROcodone-acetaminophen (NORCO) 7.5-325 MG per tablet, Take 1 tablet by mouth Daily As Needed for Severe Pain ., Disp: 10 tablet, Rfl: 0  •  losartan-hydrochlorothiazide (HYZAAR) 100-25 MG per tablet, Take 1 tablet by mouth Daily., Disp: 90 tablet, Rfl: 3  •  metFORMIN (GLUCOPHAGE) 500 MG tablet, Take 1 tablet by mouth 2 (Two) Times a Day With Meals., Disp: 180 tablet, Rfl: 3  •  nitroglycerin (NITROLINGUAL) 0.4 MG/SPRAY spray, Place 1 spray under the tongue Every 5 (Five) Minutes As Needed for Chest Pain., Disp: 4.9 g, Rfl: 12  •  ONE TOUCH ULTRA TEST test strip, 1 each by Other route 3 (Three) Times a Day., Disp: 180 each, Rfl: 3  •  pantoprazole (PROTONIX) 40 MG EC tablet, Take 1 tablet by mouth Daily., Disp: 90 tablet, Rfl: 3  •  SITagliptin-MetFORMIN HCl ER (JANUMET XR) 100-1000 MG tablet, Take 1 tablet by mouth Daily., Disp: 90 tablet, Rfl: 3  •  venlafaxine (EFFEXOR) 50 MG tablet, Take 1 tablet by mouth 2 (Two) Times a Day., Disp: 180 tablet, Rfl: 3    OBJECTIVE:  /78 (BP Location: Left arm, Patient Position: Sitting, Cuff Size: Adult)   Pulse 68   Resp 16   Ht 167.6 cm (65.98\")   Wt 78.9 kg (174 lb 0.3 oz)   SpO2 97%   Breastfeeding? No   BMI 28.10 kg/m²    Physical Exam   Constitutional: She is oriented to person, place, and time. She appears well-nourished. No distress.   Cardiovascular: Normal rate, regular rhythm and normal heart sounds.   No murmur heard.  Pulmonary/Chest: Effort normal and breath sounds normal. She has no wheezes.   Neurological: She is alert and oriented to person, place, and time.   Psychiatric: She has a normal mood and affect.       Assessment/Plan    Diagnoses and all orders for this visit:    Type 2 diabetes mellitus with other circulatory complication, without long-term current use of insulin (CMS/[facility])  -     Hemoglobin A1c; Future  -     Lipid Panel; Future  Check labs as above.  We will continue current medications unless otherwise indicated by " results.  She had adverse effects with Jardiance and is averse to injectable therapy, though Victoza could be a good option given her cardiac history.  She is on RAAS blockade.  She is also on a statin.  Eye exam is up-to-date.    Coronary artery disease involving native coronary artery of native heart without angina pectoris  Stented coronary artery x 5 stents last 2014 [place]   Hx of CABG 1992 AND 1993 [facility]  -     Lipid Panel; Future  Stable without symptoms on aspirin, beta-blocker, Plavix, and statin.  Lipid panel due and if LDL remains above 70, we could consider therapy with PCSK9 inhibitor.    Essential hypertension  -     Comprehensive Metabolic Panel; Future  -     CBC (No Diff); Future  Well controlled, BP goal for age is <140/90 per JNC 8 guidelines and continue current medications    Hx-TIA (transient ischemic attack)  Continue aspirin, Plavix, and statin for secondary prophylaxis.    Gastroesophageal reflux disease without esophagitis  Stable without exacerbation on PPI.    Personal history of nicotine dependence  -     CT Chest Low Dose Wo; Future  CT chest in June for annual screening.    Overweight  Recommended attention to portion control and being careful about the types and timing of meals for the purpose of weight management.        An After Visit Summary was printed and given to the patient at discharge.  Return in about 4 months (around 7/18/2019) for Medicare Wellness.         Johny Lehman D.O. 3/18/2019

## 2019-05-21 ENCOUNTER — HOSPITAL ENCOUNTER (OUTPATIENT)
Dept: VASCULAR LAB | Age: 69
Discharge: HOME | End: 2019-05-21
Payer: MEDICARE

## 2019-05-21 ENCOUNTER — OFFICE VISIT (OUTPATIENT)
Dept: VASCULAR SURGERY | Age: 69
End: 2019-05-21
Payer: MEDICARE

## 2019-05-21 VITALS — DIASTOLIC BLOOD PRESSURE: 75 MMHG | SYSTOLIC BLOOD PRESSURE: 138 MMHG

## 2019-05-21 DIAGNOSIS — I65.23 BILATERAL CAROTID ARTERY STENOSIS: ICD-10-CM

## 2019-05-21 DIAGNOSIS — I65.22 CAROTID ARTERY STENOSIS, SYMPTOMATIC, LEFT: ICD-10-CM

## 2019-05-21 DIAGNOSIS — I65.23 CAROTID STENOSIS, ASYMPTOMATIC, BILATERAL: Primary | ICD-10-CM

## 2019-05-21 DIAGNOSIS — I65.23 BILATERAL CAROTID ARTERY STENOSIS: Primary | ICD-10-CM

## 2019-05-21 PROBLEM — I77.9 CAROTID ARTERY DISEASE (HCC): Status: ACTIVE | Noted: 2019-05-21

## 2019-05-21 PROCEDURE — G8400 PT W/DXA NO RESULTS DOC: HCPCS | Performed by: PHYSICIAN ASSISTANT

## 2019-05-21 PROCEDURE — 4040F PNEUMOC VAC/ADMIN/RCVD: CPT | Performed by: PHYSICIAN ASSISTANT

## 2019-05-21 PROCEDURE — 1090F PRES/ABSN URINE INCON ASSESS: CPT | Performed by: PHYSICIAN ASSISTANT

## 2019-05-21 PROCEDURE — 1123F ACP DISCUSS/DSCN MKR DOCD: CPT | Performed by: PHYSICIAN ASSISTANT

## 2019-05-21 PROCEDURE — 99213 OFFICE O/P EST LOW 20 MIN: CPT | Performed by: PHYSICIAN ASSISTANT

## 2019-05-21 PROCEDURE — G8421 BMI NOT CALCULATED: HCPCS | Performed by: PHYSICIAN ASSISTANT

## 2019-05-21 PROCEDURE — 93880 EXTRACRANIAL BILAT STUDY: CPT

## 2019-05-21 PROCEDURE — G8427 DOCREV CUR MEDS BY ELIG CLIN: HCPCS | Performed by: PHYSICIAN ASSISTANT

## 2019-05-21 PROCEDURE — G8598 ASA/ANTIPLAT THER USED: HCPCS | Performed by: PHYSICIAN ASSISTANT

## 2019-05-21 PROCEDURE — 1036F TOBACCO NON-USER: CPT | Performed by: PHYSICIAN ASSISTANT

## 2019-05-21 PROCEDURE — 3017F COLORECTAL CA SCREEN DOC REV: CPT | Performed by: PHYSICIAN ASSISTANT

## 2019-05-21 NOTE — PROGRESS NOTES
Patient Care Team:  Olivia Llanos as PCP - Robyn Roth MD as Consulting Physician (Cardiology)  Rosanne Hernandez MD (Family Medicine)      Subjective    Mrs. Miguel Escalante presents for follow-up of carotid artery stenosis. Her current treatment includes ASA EC, Plavix and a statin daily. She denies a history of CVA. She reports no TIA's, episodes of amaurosis fugax and episodes of lateralizing weakness/numbness/tingling    Chaim Kilgore is a 81 y.o. female with the following history reviewed and recorded in [facility]:  Patient Active Problem List    Diagnosis Date Noted    [facility] 05/21/2019    Left carotid stenosis 01/22/2018    Bilateral carotid artery stenosis 01/22/2018     Current Outpatient Medications   Medication Sig Dispense Refill    ondansetron (ZOFRAN ODT) 8 MG disintegrating tablet Take 1 tablet by mouth every 8 hours as needed for Nausea or Vomiting 20 tablet 0    DiphenhydrAMINE HCl, Sleep, (SLEEP AID) 25 MG CAPS Take 1 capsule by mouth nightly      HYDROcodone-acetaminophen (NORCO) 7.5-325 MG per tablet Take 1 tablet by mouth every 6 hours as needed for Pain .  aspirin 81 MG tablet Take 81 mg by mouth daily      atenolol (TENORMIN) 50 MG tablet Take 50 mg by mouth daily       atorvastatin (LIPITOR) 40 MG tablet Take 40 mg by mouth nightly       clopidogrel (PLAVIX) 75 MG tablet Take 75 mg by mouth daily      hydrochlorothiazide (HYDRODIURIL) 25 MG tablet Take 25 mg by mouth daily       lisinopril (PRINIVIL;ZESTRIL) 20 MG tablet Take 20 mg by mouth daily       metFORMIN (GLUCOPHAGE) 500 MG tablet Take 500 mg by mouth daily (with breakfast)       glucose blood VI test strips (ASCENSIA AUTODISC VI;ONE TOUCH ULTRA TEST VI) strip 1 each by In Vitro route daily As needed.       pantoprazole (PROTONIX) 40 MG tablet Take 40 mg by mouth daily      venlafaxine 75 MG extended release tablet Take 75 mg by mouth daily (with breakfast)        No current facility-administered medications for this visit. Current Outpatient Medications on File Prior to Visit   Medication Sig Dispense Refill    ondansetron (ZOFRAN ODT) 8 MG disintegrating tablet Take 1 tablet by mouth every 8 hours as needed for Nausea or Vomiting 20 tablet 0    DiphenhydrAMINE HCl, Sleep, (SLEEP AID) 25 MG CAPS Take 1 capsule by mouth nightly      HYDROcodone-acetaminophen (NORCO) 7.5-325 MG per tablet Take 1 tablet by mouth every 6 hours as needed for Pain .  aspirin 81 MG tablet Take 81 mg by mouth daily      atenolol (TENORMIN) 50 MG tablet Take 50 mg by mouth daily       atorvastatin (LIPITOR) 40 MG tablet Take 40 mg by mouth nightly       clopidogrel (PLAVIX) 75 MG tablet Take 75 mg by mouth daily      hydrochlorothiazide (HYDRODIURIL) 25 MG tablet Take 25 mg by mouth daily       lisinopril (PRINIVIL;ZESTRIL) 20 MG tablet Take 20 mg by mouth daily       metFORMIN (GLUCOPHAGE) 500 MG tablet Take 500 mg by mouth daily (with breakfast)       glucose blood VI test strips (ASCENSIA AUTODISC VI;ONE TOUCH ULTRA TEST VI) strip 1 each by In Vitro route daily As needed.  pantoprazole (PROTONIX) 40 MG tablet Take 40 mg by mouth daily      venlafaxine 75 MG extended release tablet Take 75 mg by mouth daily (with breakfast)        No current facility-administered medications on file prior to visit.       Allergies: Chantix [varenicline] and Demerol hcl [meperidine]  Past Medical History:   Diagnosis Date    Acid reflux     Anxiety     Blockage of coronary artery of heart [facility])     sees dr. Sravanthi Teixeira Carotid [facility] ([facility]     CHF (congestive heart failure) (HCC)     Hyperlipidemia     Hypertension     Osteoarthritis     rheumatoid    Post-menopausal     PVD (peripheral vascular disease) (HCC)     Raynaud disease     Stomach ulcer     [facility]      Past Surgical History:   Procedure Laterality Date    APPENDECTOMY      CARDIAC SURGERY      x2    CORONARY ANGIOPLASTY WITH STENT PLACEMENT      HERNIA REPAIR      INCISIONAL HERNIA REPAIR      OH THROMBOENDARTECTMY NECK,NECK INCIS Left 2018    CAROTID ENDARTERECTOMY performed by Jorge Moreno MD at [address] [place] [address]      stent    VASCULAR SURGERY  2018    DJM. L [facility]     Family History   Problem Relation Age of Onset    Heart Attack Mother     Stroke Mother     Other Mother         aneurysm    Bleeding Prob Sister         blood clot     Social History     Tobacco Use    Smoking status: Former Smoker     Last attempt to quit: 2011     Years since quittin.4    Smokeless tobacco: Never Used   Substance Use Topics    Alcohol use: No       Review of Systems    Constitutional - no significant activity change, appetite change, or unexpected weight change. No fever or chills. No diaphoresis or significant fatigue. HENT - no significant rhinorrhea or epistaxis. No tinnitus or significant hearing loss. Eyes - no sudden vision change or amaurosis. Respiratory - no significant shortness of breath, wheezing, or stridor. No apnea, cough, or chest tightness associated with shortness of breath. Cardiovascular - no chest pain, syncope, or significant dizziness. No palpitations or significant leg swelling. No claudication. Gastrointestinal - no abdominal swelling or pain. No blood in stool. No severe constipation, diarrhea, nausea, or vomiting. Genitourinary - No difficulty urinating, dysuria, frequency, or urgency. No flank pain or hematuria. Musculoskeletal - no back pain, gait disturbance, or myalgia. Skin - no color change, rash, pallor, or new wound. Neurologic - no dizziness, facial asymmetry, or light headedness. No seizures. No speech difficulty or lateralizing weakness. Hematologic - no easy bruising or excessive bleeding. Psychiatric - no severe anxiety or nervousness. No confusion.   All other review of systems are negative. Physical Exam    Vitals:    05/21/19 1404 05/21/19 1410   BP: 139/76 138/75   Site: Right Upper Arm Left Upper Arm   Position: Sitting Sitting   Cuff Size: Medium Adult Medium Adult       Constitutional - well developed, well nourished. No diaphoresis or acute distress. HENT - head normocephalic. Right external ear canal appears normal.  Left external ear canal appears normal.  Septum appears midline. Eyes - conjunctiva normal.  EOMS normal.  No exudate. No icterus. Neck- ROM appears normal, no tracheal deviation. Cardiovascular - Regular rate and rhythm. Heart sounds are normal.  No murmur, rub, or gallop. Carotid pulses are 2+ to palpation bilaterally without bruit. Pulmonary - effort appears normal.  No respiratory distress. Lungs - Breath sounds normal. No wheezes or rales. Extremities - Radial and brachial pulses are 2+ to palpation bilaterally. Neurologic - alert and oriented X 3. Physiologic. Tongue midline. Face symmetric. Skin - warm, dry, and intact. No rash, erythema, or pallor. Psychiatric - mood, affect, and behavior appear normal.  Judgment and thought processes appear normal.    Risk factors for atherosclerosis of all vascular beds have been reviewed with the patient including:  Family history, tobacco abuse in all forms, elevated cholesterol, hyperlipidemia, and diabetes. Doppler results:    Right CCA/ICA <50% stenotic  Left CCA/ICA <50% stenotic  Right vertebral artery flow is antegrade  Left vertebral artery flow is antegrade  Individual velocities reviewed: Yes. Test results were reviewed with the patient. Disease process is stable      Options have been discussed with the patient including continued medical management. Patient has opted to proceed with continued medical management. Assessment    1.  Bilateral carotid artery stenosis        Plan    Continue ASA EC 81 mg daily  Continue Plavix 75 mg daily  Strongly

## 2019-09-17 ENCOUNTER — OFFICE VISIT (OUTPATIENT)
Dept: INTERNAL MEDICINE | Facility: CLINIC | Age: 69
End: 2019-09-17

## 2019-09-17 VITALS
WEIGHT: 172.3 LBS | OXYGEN SATURATION: 98 % | HEIGHT: 66 IN | RESPIRATION RATE: 16 BRPM | BODY MASS INDEX: 27.69 KG/M2 | SYSTOLIC BLOOD PRESSURE: 122 MMHG | DIASTOLIC BLOOD PRESSURE: 60 MMHG | HEART RATE: 72 BPM

## 2019-09-17 DIAGNOSIS — E11.42 TYPE 2 DIABETES MELLITUS WITH DIABETIC POLYNEUROPATHY, WITHOUT LONG-TERM CURRENT USE OF INSULIN (HCC): Primary | ICD-10-CM

## 2019-09-17 DIAGNOSIS — Z87.891 PERSONAL HISTORY OF NICOTINE DEPENDENCE: ICD-10-CM

## 2019-09-17 DIAGNOSIS — I25.708 CORONARY ARTERY DISEASE OF BYPASS GRAFT OF NATIVE HEART WITH STABLE ANGINA PECTORIS (HCC): ICD-10-CM

## 2019-09-17 DIAGNOSIS — Z95.5 STENTED CORONARY ARTERY: ICD-10-CM

## 2019-09-17 DIAGNOSIS — K21.00 GASTROESOPHAGEAL REFLUX DISEASE WITH ESOPHAGITIS: ICD-10-CM

## 2019-09-17 DIAGNOSIS — I73.9 CLAUDICATION IN PERIPHERAL VASCULAR DISEASE (HCC): ICD-10-CM

## 2019-09-17 DIAGNOSIS — E11.59 TYPE 2 DIABETES MELLITUS WITH OTHER CIRCULATORY COMPLICATION, WITHOUT LONG-TERM CURRENT USE OF INSULIN (HCC): ICD-10-CM

## 2019-09-17 DIAGNOSIS — Z23 FLU VACCINE NEED: ICD-10-CM

## 2019-09-17 DIAGNOSIS — I65.23 BILATERAL CAROTID ARTERY STENOSIS: ICD-10-CM

## 2019-09-17 DIAGNOSIS — I10 ESSENTIAL HYPERTENSION: ICD-10-CM

## 2019-09-17 DIAGNOSIS — L30.9 DERMATITIS: ICD-10-CM

## 2019-09-17 DIAGNOSIS — E78.2 MIXED HYPERLIPIDEMIA: ICD-10-CM

## 2019-09-17 DIAGNOSIS — Z86.73 HX-TIA (TRANSIENT ISCHEMIC ATTACK): ICD-10-CM

## 2019-09-17 PROBLEM — I77.9 CAROTID ARTERY DISEASE (HCC): Status: ACTIVE | Noted: 2019-05-21

## 2019-09-17 PROCEDURE — 90653 IIV ADJUVANT VACCINE IM: CPT | Performed by: INTERNAL MEDICINE

## 2019-09-17 PROCEDURE — 99214 OFFICE O/P EST MOD 30 MIN: CPT | Performed by: INTERNAL MEDICINE

## 2019-09-17 PROCEDURE — G0008 ADMIN INFLUENZA VIRUS VAC: HCPCS | Performed by: INTERNAL MEDICINE

## 2019-09-17 PROCEDURE — G0439 PPPS, SUBSEQ VISIT: HCPCS | Performed by: INTERNAL MEDICINE

## 2019-09-17 RX ORDER — LOSARTAN POTASSIUM AND HYDROCHLOROTHIAZIDE 25; 100 MG/1; MG/1
1 TABLET ORAL DAILY
Qty: 90 TABLET | Refills: 3 | Status: SHIPPED | OUTPATIENT
Start: 2019-09-17

## 2019-09-17 RX ORDER — KETOCONAZOLE 20 MG/G
CREAM TOPICAL EVERY 12 HOURS SCHEDULED
Qty: 60 G | Refills: 1 | Status: SHIPPED | OUTPATIENT
Start: 2019-09-17

## 2019-09-17 RX ORDER — ATORVASTATIN CALCIUM 40 MG/1
40 TABLET, FILM COATED ORAL DAILY
Qty: 90 TABLET | Refills: 3 | Status: SHIPPED | OUTPATIENT
Start: 2019-09-17

## 2019-09-17 RX ORDER — PANTOPRAZOLE SODIUM 40 MG/1
40 TABLET, DELAYED RELEASE ORAL DAILY
Qty: 90 TABLET | Refills: 3 | Status: SHIPPED | OUTPATIENT
Start: 2019-09-17

## 2019-09-17 RX ORDER — CLOPIDOGREL BISULFATE 75 MG/1
75 TABLET ORAL DAILY
Qty: 90 TABLET | Refills: 3 | Status: SHIPPED | OUTPATIENT
Start: 2019-09-17

## 2019-09-17 RX ORDER — ATENOLOL 50 MG/1
50 TABLET ORAL DAILY
Qty: 90 TABLET | Refills: 3 | Status: SHIPPED | OUTPATIENT
Start: 2019-09-17

## 2019-09-17 NOTE — PROGRESS NOTES
The ABCs of the Annual Wellness Visit  Subsequent Medicare Wellness Visit    No chief complaint on file.  See  note    Subjective   History of Present Illness:  Linda Pickens is a 69 y.o. female who presents for a Subsequent Medicare Wellness Visit.    HEALTH RISK ASSESSMENT    Recent Hospitalizations:  No hospitalization(s) within the last year.    Current Medical Providers:  Patient Care Team:  Johny Lehman DO as PCP - General (Internal Medicine)  Ashley Drake APRN as PCP - Claims Attributed  Carlos Mak MD as Cardiologist (Cardiology)  Eb Capps MD as Consulting Physician (Ophthalmology)  Adam Mullen MD as Consulting Physician (Otolaryngology)  Ashley Drake APRN as Referring Physician (Nurse Practitioner)  Des Bowser DMD (General Practice)    Smoking Status:  Social History     Tobacco Use   Smoking Status Former Smoker   • Years: 35.00   • Types: Cigarettes   • Last attempt to quit: 2012   • Years since quittin.7   Smokeless Tobacco Never Used       Alcohol Consumption:  Social History     Substance and Sexual Activity   Alcohol Use No       Depression Screen:   PHQ-2/PHQ-9 Depression Screening 3/18/2019   Little interest or pleasure in doing things 0   Feeling down, depressed, or hopeless 0   Trouble falling or staying asleep, or sleeping too much -   Feeling tired or having little energy -   Poor appetite or overeating -   Feeling bad about yourself - or that you are a failure or have let yourself or your family down -   Trouble concentrating on things, such as reading the newspaper or watching television -   Moving or speaking so slowly that other people could have noticed. Or the opposite - being so fidgety or restless that you have been moving around a lot more than usual -   Thoughts that you would be better off dead, or of hurting yourself in some way -   Total Score 0   If you checked off any problems, how difficult have these problems made it  for you to do your work, take care of things at home, or get along with other people? -       Fall Risk Screen:  GIGI Fall Risk Assessment has not been completed.    Health Habits and Functional and Cognitive Screening:  Functional & Cognitive Status 9/17/2019   Do you have difficulty preparing food and eating? No   Do you have difficulty bathing yourself, getting dressed or grooming yourself? No   Do you have difficulty using the toilet? No   Do you have difficulty moving around from place to place? No   Do you have trouble with steps or getting out of a bed or a chair? No   Current Diet Unhealthy Diet   Dental Exam Up to date   Eye Exam Up to date   Exercise (times per week) 0 times per week   Current Exercise Activities Include None   Do you need help using the phone?  No   Are you deaf or do you have serious difficulty hearing?  No   Do you need help with transportation? No   Do you need help shopping? No   Do you need help preparing meals?  No   Do you need help with housework?  No   Do you need help with laundry? No   Do you need help taking your medications? No   Do you need help managing money? No   Do you ever drive or ride in a car without wearing a seat belt? No   Have you felt unusual stress, anger or loneliness in the last month? No   Who do you live with? Spouse   If you need help, do you have trouble finding someone available to you? No   Have you been bothered in the last four weeks by sexual problems? No   Do you have difficulty concentrating, remembering or making decisions? No         Does the patient have evidence of cognitive impairment? No    Asprin use counseling:Taking ASA appropriately as indicated    Age-appropriate Screening Schedule:  Refer to the list below for future screening recommendations based on patient's age, sex and/or medical conditions. Orders for these recommended tests are listed in the plan section. The patient has been provided with a written plan.    Health Maintenance    Topic Date Due   • TDAP/TD VACCINES (1 - Tdap) 02/20/1971   • ZOSTER VACCINE (1 of 2) 02/20/2002   • DIABETIC FOOT EXAM  04/21/2018   • LIPID PANEL  02/09/2019   • HEMOGLOBIN A1C  06/18/2019   • INFLUENZA VACCINE  08/01/2019   • MAMMOGRAM  05/08/2020   • DIABETIC EYE EXAM  06/11/2020   • PNEUMOCOCCAL VACCINES (65+ LOW/MEDIUM RISK) (2 of 2 - PPSV23) 03/16/2021   • COLONOSCOPY  02/18/2024   • URINE MICROALBUMIN  Discontinued          The following portions of the patient's history were reviewed and updated as appropriate: allergies, current medications, past family history, past medical history, past social history, past surgical history and problem list.    Outpatient Medications Prior to Visit   Medication Sig Dispense Refill   • amLODIPine (NORVASC) 5 MG tablet Take 2 tablets by mouth Daily. 90 tablet 3   • aspirin 81 MG EC tablet Take 1 tablet by mouth Daily. 90 tablet 3   • Blood Glucose Monitoring Suppl (ONE TOUCH ULTRA MINI) w/Device kit Inject 1 strip under the skin into the appropriate area as directed 2 (Two) Times a Day. 1 each 11   • DiphenhydrAMINE HCl, Sleep, 50 MG capsule Take  by mouth.     • fluocinonide (LIDEX) 0.05 % cream Apply  topically to the appropriate area as directed Every 12 (Twelve) Hours. 30 g 1   • nitroglycerin (NITROLINGUAL) 0.4 MG/SPRAY spray Place 1 spray under the tongue Every 5 (Five) Minutes As Needed for Chest Pain. 4.9 g 12   • ONE TOUCH ULTRA TEST test strip 1 each by Other route 3 (Three) Times a Day. 180 each 3   • SITagliptin-MetFORMIN HCl ER (JANUMET XR) 100-1000 MG tablet Take 1 tablet by mouth Daily. 90 tablet 3   • venlafaxine (EFFEXOR) 50 MG tablet Take 1 tablet by mouth 2 (Two) Times a Day. 180 tablet 3   • atenolol (TENORMIN) 50 MG tablet Take 1 tablet by mouth Daily. 90 tablet 3   • atorvastatin (LIPITOR) 40 MG tablet Take 1 tablet by mouth Daily. 90 tablet 3   • clopidogrel (PLAVIX) 75 MG tablet Take 1 tablet by mouth Daily. 90 tablet 3   •  "losartan-hydrochlorothiazide (HYZAAR) 100-25 MG per tablet Take 1 tablet by mouth Daily. 90 tablet 3   • metFORMIN (GLUCOPHAGE) 500 MG tablet Take 1 tablet by mouth 2 (Two) Times a Day With Meals. 180 tablet 3   • pantoprazole (PROTONIX) 40 MG EC tablet Take 1 tablet by mouth Daily. 90 tablet 3   • HYDROcodone-acetaminophen (NORCO) 7.5-325 MG per tablet Take 1 tablet by mouth Daily As Needed for Severe Pain . 10 tablet 0     No facility-administered medications prior to visit.        Patient Active Problem List   Diagnosis   • Gastroesophageal reflux disease without esophagitis   • Type 2 diabetes mellitus with circulatory disorder, without long-term current use of insulin (CMS/[facility])   • Essential hypertension   • Mixed hyperlipidemia   • Peptic ulcer   • CAD (coronary artery disease)   • Anxiety   • Overweight   • Encounter for screening for lung cancer   • Hx of CABG 1992 AND 1993 [facility]   • Multinodular goiter   • RAIN (dyspnea on exertion)   • Hx-TIA (transient ischemic attack)   • History of left-sided carotid endarterectomy   • Chronic musculoskeletal pain   • Stented coronary artery x 5 stents last 2014 [place]    • Carotid artery disease (CMS/[facility])       Advanced Care Planning:  Patient does not have an advance directive - information provided to the patient today    Review of Systems See  note    Compared to one year ago, the patient feels her physical health is better.  Compared to one year ago, the patient feels her mental health is better.    Reviewed chart for potential of high risk medication in the elderly: yes  Reviewed chart for potential of harmful drug interactions in the elderly:yes    Objective         Vitals:    09/17/19 0915   BP: 122/60   BP Location: Left arm   Patient Position: Sitting   Cuff Size: Adult   Pulse: 72   Resp: 16   SpO2: 98%   Weight: 78.2 kg (172 lb 4.8 oz)   Height: 167.6 cm (65.98\")       Body mass index is 27.83 kg/m².  Discussed the patient's BMI with her. " The BMI is above average; BMI management plan is completed.    Physical Exam See  note          Assessment/Plan   Medicare Risks and Personalized Health Plan  CMS Preventative Services Quick Reference  Advance Directive Discussion  Fall Risk  Immunizations Discussed/Encouraged (specific immunizations; adacel Tdap, Influenza and Shingrix )  Lung Cancer Risk  Obesity/Overweight     The above risks/problems have been discussed with the patient.  Pertinent information has been shared with the patient in the After Visit Summary.  Follow up plans and orders are seen below in the Assessment/Plan Section.    Diagnoses and all orders for this visit:    1. Type 2 diabetes mellitus with diabetic polyneuropathy, without long-term current use of insulin (CMS/HCC) (Primary)  -     metFORMIN (GLUCOPHAGE) 500 MG tablet; Take 1 tablet by mouth 2 (Two) Times a Day With Meals.  Dispense: 180 tablet; Refill: 3    2. Type 2 diabetes mellitus with other circulatory complication, without long-term current use of insulin (CMS/HCC)    3. Claudication in peripheral vascular disease (CMS/HCC)    4. Coronary artery disease of bypass graft of native heart with stable angina pectoris (CMS/HCC)  -     clopidogrel (PLAVIX) 75 MG tablet; Take 1 tablet by mouth Daily.  Dispense: 90 tablet; Refill: 3    5. Stented coronary artery x 5 stents last 2014 [place]     6. Hx-TIA (transient ischemic attack)    7. Bilateral carotid artery stenosis    8. Gastroesophageal reflux disease with esophagitis  -     pantoprazole (PROTONIX) 40 MG EC tablet; Take 1 tablet by mouth Daily.  Dispense: 90 tablet; Refill: 3    9. Essential hypertension  -     losartan-hydrochlorothiazide (HYZAAR) 100-25 MG per tablet; Take 1 tablet by mouth Daily.  Dispense: 90 tablet; Refill: 3  -     clopidogrel (PLAVIX) 75 MG tablet; Take 1 tablet by mouth Daily.  Dispense: 90 tablet; Refill: 3  -     atenolol (TENORMIN) 50 MG tablet; Take 1 tablet by mouth Daily.  Dispense:  90 tablet; Refill: 3    10. Mixed hyperlipidemia  -     atorvastatin (LIPITOR) 40 MG tablet; Take 1 tablet by mouth Daily.  Dispense: 90 tablet; Refill: 3    11. Dermatitis  -     ketoconazole (NIZORAL) 2 % cream; Apply  topically to the appropriate area as directed Every 12 (Twelve) Hours.  Dispense: 60 g; Refill: 1  -     fluocinonide-emollient (LIDEX-E) 0.05 % cream; Apply  topically to the appropriate area as directed 2 (Two) Times a Day.  Dispense: 60 g; Refill: 0    12. Personal history of nicotine dependence  -     CT Chest Low Dose Wo; Future    13. Flu vaccine need  -     Fluad Tri 67yr (0931-0920)      Follow Up:  Return in about 6 months (around 3/17/2020) for Recheck.     An After Visit Summary and PPPS were given to the patient.

## 2019-09-17 NOTE — PROGRESS NOTES
CC: rash on back and right breast    History:  Linda Pickens is a 69 y.o. female   She knows she has been doing reasonably well without any acute illness, though she has an ongoing on her left flank and her right breast.  She has applied lotions, powders, triamcinolone, but has not had any success with controlling it.  It is always there, though the itching waxes and wanes.  She denies any anginal symptoms and continues on medications for CAD and also given her history of TIA and ongoing carotid artery stenosis.  She does have a new cardiologist in [place], with whom she is undergoing further surveillance.  Other history is reviewed and unchanged from last visit.    ROS:  Review of Systems   Constitutional: Negative for chills and fever.   Respiratory: Negative for cough and shortness of breath.    Cardiovascular: Negative for chest pain and palpitations.   Gastrointestinal: Negative for abdominal pain and constipation.   Skin: Positive for rash.        reports that she quit smoking about 7 years ago. Her smoking use included cigarettes. She quit after 35.00 years of use. She has never used smokeless tobacco. She reports that she does not drink alcohol or use drugs.      Current Outpatient Medications:   •  amLODIPine (NORVASC) 5 MG tablet, Take 2 tablets by mouth Daily., Disp: 90 tablet, Rfl: 3  •  aspirin 81 MG EC tablet, Take 1 tablet by mouth Daily., Disp: 90 tablet, Rfl: 3  •  atenolol (TENORMIN) 50 MG tablet, Take 1 tablet by mouth Daily., Disp: 90 tablet, Rfl: 3  •  atorvastatin (LIPITOR) 40 MG tablet, Take 1 tablet by mouth Daily., Disp: 90 tablet, Rfl: 3  •  Blood Glucose Monitoring Suppl (ONE TOUCH ULTRA MINI) w/Device kit, Inject 1 strip under the skin into the appropriate area as directed 2 (Two) Times a Day., Disp: 1 each, Rfl: 11  •  clopidogrel (PLAVIX) 75 MG tablet, Take 1 tablet by mouth Daily., Disp: 90 tablet, Rfl: 3  •  DiphenhydrAMINE HCl, Sleep, 50 MG capsule, Take  by mouth., Disp: , Rfl:   •  " fluocinonide (LIDEX) 0.05 % cream, Apply  topically to the appropriate area as directed Every 12 (Twelve) Hours., Disp: 30 g, Rfl: 1  •  losartan-hydrochlorothiazide (HYZAAR) 100-25 MG per tablet, Take 1 tablet by mouth Daily., Disp: 90 tablet, Rfl: 3  •  metFORMIN (GLUCOPHAGE) 500 MG tablet, Take 1 tablet by mouth 2 (Two) Times a Day With Meals., Disp: 180 tablet, Rfl: 3  •  nitroglycerin (NITROLINGUAL) 0.4 MG/SPRAY spray, Place 1 spray under the tongue Every 5 (Five) Minutes As Needed for Chest Pain., Disp: 4.9 g, Rfl: 12  •  ONE TOUCH ULTRA TEST test strip, 1 each by Other route 3 (Three) Times a Day., Disp: 180 each, Rfl: 3  •  pantoprazole (PROTONIX) 40 MG EC tablet, Take 1 tablet by mouth Daily., Disp: 90 tablet, Rfl: 3  •  SITagliptin-MetFORMIN HCl ER (JANUMET XR) 100-1000 MG tablet, Take 1 tablet by mouth Daily., Disp: 90 tablet, Rfl: 3  •  venlafaxine (EFFEXOR) 50 MG tablet, Take 1 tablet by mouth 2 (Two) Times a Day., Disp: 180 tablet, Rfl: 3    OBJECTIVE:  /60 (BP Location: Left arm, Patient Position: Sitting, Cuff Size: Adult)   Pulse 72   Resp 16   Ht 167.6 cm (65.98\")   Wt 78.2 kg (172 lb 4.8 oz)   SpO2 98%   Breastfeeding? No   BMI 27.83 kg/m²    Physical Exam   Constitutional: She is oriented to person, place, and time. She appears well-nourished. No distress.   Cardiovascular: Normal rate, regular rhythm and normal heart sounds.   No murmur heard.  Pulmonary/Chest: Effort normal and breath sounds normal. She has no wheezes.   Neurological: She is alert and oriented to person, place, and time.   Psychiatric: She has a normal mood and affect.       Assessment/Plan    Diagnoses and all orders for this visit:    Type 2 diabetes mellitus with diabetic polyneuropathy, without long-term current use of insulin (CMS/HCC)  Type 2 diabetes mellitus with other circulatory complication, without long-term current use of insulin (CMS/[facility])  -     metFORMIN (GLUCOPHAGE) 500 MG tablet; Take 1 tablet by " mouth 2 (Two) Times a Day With Meals.  We will continue metformin, though she is due to have labs done and is encouraged to go do this after today's appointment.    Claudication in peripheral vascular disease (CMS/HCC)  Coronary artery disease of bypass graft of native heart with stable angina pectoris (CMS/HCC)  Stented coronary artery x 5 stents last 2014 [place]   Hx-TIA (transient ischemic attack)  Bilateral carotid artery stenosis  -     clopidogrel (PLAVIX) 75 MG tablet; Take 1 tablet by mouth Daily.  Stable on aspirin, Plavix, atenolol, and statin therapy.  Check lipids and consider Repatha if LDL not at goal.    Gastroesophageal reflux disease with esophagitis  -     pantoprazole (PROTONIX) 40 MG EC tablet; Take 1 tablet by mouth Daily.  Stable without exacerbation on PPI.    Essential hypertension  -     losartan-hydrochlorothiazide (HYZAAR) 100-25 MG per tablet; Take 1 tablet by mouth Daily.  -     clopidogrel (PLAVIX) 75 MG tablet; Take 1 tablet by mouth Daily.  -     atenolol (TENORMIN) 50 MG tablet; Take 1 tablet by mouth Daily.  Well controlled, BP goal for age is <140/90 per JNC 8 guidelines and continue current medications    Mixed hyperlipidemia  -     atorvastatin (LIPITOR) 40 MG tablet; Take 1 tablet by mouth Daily.  Stable on high intensity statin therapy per ACC/AHA guidelines.    Dermatitis  -     ketoconazole (NIZORAL) 2 % cream; Apply  topically to the appropriate area as directed Every 12 (Twelve) Hours.  -     fluocinonide-emollient (LIDEX-E) 0.05 % cream; Apply  topically to the appropriate area as directed 2 (Two) Times a Day.  Treat with ketoconazole given some scaling nature of her rash.  Also continue with steroid cream for itch.    Personal history of nicotine dependence  -     CT Chest Low Dose Wo; Future  Lung cancer screening    Flu vaccine need  -     Fluad Tri 67yr ([id number])        An After Visit Summary was printed and given to the patient at discharge.  Return in about 6  months (around 3/17/2020) for Recheck.         Johny Lehman D.O. 9/17/2019   Electronically signed.

## 2019-09-27 RX ORDER — VENLAFAXINE 50 MG/1
50 TABLET ORAL 2 TIMES DAILY
Qty: 180 TABLET | Refills: 3 | Status: SHIPPED | OUTPATIENT
Start: 2019-09-27 | End: 2019-10-04

## 2019-10-04 RX ORDER — VENLAFAXINE HYDROCHLORIDE 75 MG/1
75 CAPSULE, EXTENDED RELEASE ORAL DAILY
Qty: 30 CAPSULE | Refills: 3 | Status: SHIPPED | OUTPATIENT
Start: 2019-10-04

## 2020-03-24 ENCOUNTER — TELEPHONE (OUTPATIENT)
Dept: VASCULAR SURGERY | Age: 70
End: 2020-03-24

## 2020-10-26 ENCOUNTER — TELEPHONE (OUTPATIENT)
Dept: VASCULAR SURGERY | Age: 70
End: 2020-10-26

## 2020-10-26 NOTE — TELEPHONE ENCOUNTER
I sw the pt to let her know due to [facility]  being out of the office tomorrow we are having to cancel her telehealth appt. Once we are able to get this appt r/s we will contact the pt with the information. Pt voiced understanding and is aware.

## 2023-09-22 ENCOUNTER — HOSPITAL ENCOUNTER (OUTPATIENT)
Dept: GENERAL RADIOLOGY | Facility: HOSPITAL | Age: 73
Discharge: HOME | End: 2023-09-22
Payer: MEDICARE

## 2023-09-22 ENCOUNTER — OFFICE VISIT (OUTPATIENT)
Dept: NEUROSURGERY | Facility: CLINIC | Age: 73
End: 2023-09-22

## 2023-09-22 VITALS — WEIGHT: 174 LBS | HEIGHT: 66 IN | BODY MASS INDEX: 27.97 KG/M2

## 2023-09-22 DIAGNOSIS — M48.02 FORAMINAL STENOSIS OF CERVICAL REGION: Primary | ICD-10-CM

## 2023-09-22 DIAGNOSIS — M48.02 FORAMINAL STENOSIS OF CERVICAL REGION: ICD-10-CM

## 2023-09-22 DIAGNOSIS — M54.2 CERVICALGIA: ICD-10-CM

## 2023-09-22 PROBLEM — M43.12 ACQUIRED SPONDYLOLISTHESIS OF CERVICAL VERTEBRA: Status: ACTIVE | Noted: 2023-09-22

## 2023-09-22 PROCEDURE — 1159F MED LIST DOCD IN RCRD: CPT | Performed by: PHYSICIAN ASSISTANT

## 2023-09-22 PROCEDURE — 1160F RVW MEDS BY RX/DR IN RCRD: CPT | Performed by: PHYSICIAN ASSISTANT

## 2023-09-22 PROCEDURE — 72040 X-RAY EXAM NECK SPINE 2-3 VW: CPT

## 2023-09-22 PROCEDURE — 99203 OFFICE O/P NEW LOW 30 MIN: CPT | Performed by: PHYSICIAN ASSISTANT

## 2023-09-22 RX ORDER — VENLAFAXINE 50 MG/1
75 TABLET ORAL EVERY 12 HOURS
COMMUNITY

## 2023-09-22 RX ORDER — HYDROCHLOROTHIAZIDE 12.5 MG/1
25 TABLET ORAL DAILY
COMMUNITY

## 2023-09-22 RX ORDER — ASPIRIN 81 MG/1
81 TABLET ORAL DAILY
COMMUNITY

## 2023-09-22 RX ORDER — PANTOPRAZOLE SODIUM 40 MG/1
40 TABLET, DELAYED RELEASE ORAL DAILY
COMMUNITY

## 2023-09-22 RX ORDER — ATENOLOL 50 MG/1
50 TABLET ORAL DAILY
COMMUNITY

## 2023-09-22 RX ORDER — ATORVASTATIN CALCIUM 40 MG/1
40 TABLET, FILM COATED ORAL DAILY
COMMUNITY

## 2023-09-22 NOTE — PROGRESS NOTES
Chief complaint:   Chief Complaint   Patient presents with    Neck Pain     Pain in neck and back       Subjective     HPI: Linda presents with worsening chronic right-sided neck pain.  Symptoms significantly exacerbated in March of this year requiring hospital stay for pain control.  She indicates that pain level fluctuates, some days are better than others.  Is located mostly on the right side and is exacerbated by rotation and asked tension as well as flexion.  He has radiating pain into the right trapezius and parascapular region.  He denies radiation into the arms as well as focal weakness, numbness and tingling, hand dexterity issues and balance issues.  MRI with and CT of the cervical spine were obtained while she was in the hospital.  She was treated as an inpatient with narcotic pain medication as well as Valium and discharged on hydrocodone and baclofen which she is currently utilizing sparingly.  Posthospitalization she was started in physical therapy which did not provide symptom relief.    Review of Systems     Past Medical History:   Diagnosis Date    Anxiety     Arthritis     CAD (coronary artery disease)     CHF (congestive heart failure)     Depression     Diabetes mellitus     HTN (hypertension)     PONV (postoperative nausea and vomiting)     Vitamin D deficiency      Past Surgical History:   Procedure Laterality Date    APPENDECTOMY      CARDIAC CATHETERIZATION      [facility]    CORONARY ARTERY BYPASS GRAFT  1992    x 2    CORONARY STENT PLACEMENT  2014    X 5    ENDOSCOPY N/A 1/4/2017    Procedure: ESOPHAGOGASTRODUODENOSCOPY WITH ANESTHESIA;  Surgeon: Maico Shelton DO;  Location: [facility] ENDOSCOPY;  Service:     HERNIA REPAIR      SHOULDER SURGERY      TONSILLECTOMY       Family History   Problem Relation Age of Onset    Breast cancer Mother     Lung cancer Mother     No Known Problems Father     Arrhythmia Maternal Grandmother     Heart disease Paternal Grandmother     Diabetes  "Paternal Grandmother     Heart disease Paternal Grandfather     Diabetes Paternal Grandfather     Lung cancer Maternal Grandfather     No Known Problems Sister     No Known Problems Brother     No Known Problems Daughter     No Known Problems Son     No Known Problems Maternal Aunt     No Known Problems Paternal Aunt     Colon cancer Neg Hx     Colon polyps Neg Hx     Liver cancer Neg Hx     Liver disease Neg Hx     BRCA 1/2 Neg Hx     Endometrial cancer Neg Hx     Ovarian cancer Neg Hx      Social History     Tobacco Use    Smoking status: Former     Packs/day: 1.00     Years: 40.00     Pack years: 40.00     Types: Cigarettes     Quit date: 2012     Years since quittin.3    Smokeless tobacco: Never   Substance Use Topics    Alcohol use: No    Drug use: No     (Not in a hospital admission)    Allergies:  Diazepam, Meperidine, Empagliflozin, Meperidine and related, and Varenicline    Objective      Vital Signs  Ht 167.6 cm (66\")   Wt 78.9 kg (174 lb)   BMI 28.08 kg/m²     Physical Exam  Constitutional:       Appearance: Normal appearance. She is well-developed.   HENT:      Head: Normocephalic.   Eyes:      General: Lids are normal.      Conjunctiva/sclera: Conjunctivae normal.      Pupils: Pupils are equal, round, and reactive to light.   Pulmonary:      Effort: Pulmonary effort is normal.      Breath sounds: Normal breath sounds.   Musculoskeletal:         General: Normal range of motion.      Cervical back: Tenderness (There is limited range of motion in all planes, especially with right lateral rotation.  Positive Spurling's on the right.) present.   Skin:     General: Skin is warm.   Neurological:      Mental Status: She is alert and oriented to person, place, and time.      GCS: GCS eye subscore is 4. GCS verbal subscore is 5. GCS motor subscore is 6.      Cranial Nerves: No cranial nerve deficit.      Sensory: No sensory deficit.      Gait: Gait is intact.      Deep Tendon Reflexes: Reflexes are " normal and symmetric. Reflexes normal.      Reflex Scores:       Tricep reflexes are 2+ on the right side and 2+ on the left side.       Bicep reflexes are 2+ on the right side and 2+ on the left side.       Brachioradialis reflexes are 2+ on the right side and 2+ on the left side.       Patellar reflexes are 2+ on the right side and 2+ on the left side.       Achilles reflexes are 2+ on the right side and 2+ on the left side.  Psychiatric:         Speech: Speech normal.         Behavior: Behavior normal.         Thought Content: Thought content normal.       Neurologic Exam     Mental Status   Oriented to person, place, and time.   Speech: speech is normal     Cranial Nerves     CN III, IV, VI   Pupils are equal, round, and reactive to light.    Motor Exam   Muscle bulk: normal    Strength   Right deltoid: 5/5  Left deltoid: 5/5  Right biceps: 5/5  Left biceps: 5/5  Right triceps: 5/5  Left triceps: 5/5  Right wrist flexion: 5/5  Left wrist flexion: 5/5  Right wrist extension: 5/5  Left wrist extension: 5/5  Right interossei: 5/5  Left interossei: 5/5    Sensory Exam   Light touch normal.     Gait, Coordination, and Reflexes     Gait  Gait: normal    Reflexes   Right brachioradialis: 2+  Left brachioradialis: 2+  Right biceps: 2+  Left biceps: 2+  Right triceps: 2+  Left triceps: 2+  Right patellar: 2+  Left patellar: 2+  Right achilles: 2+  Left achilles: 2+  Right : 2+  Left : 2+  Right Peña: absent  Left Peña: absent  Right ankle clonus: absent  Left ankle clonus: absentTandem gait is steady     Imaging review: Images were not available for review although MRI report of the cervical spine from [facility] dated 3/4/2023 was reviewed and indicates moderate right and mild left neuroforaminal stenosis at C2-3.  There is right eccentric DOC at C3-4 with severe right and moderate to severe left neuroforaminal stenosis with mild central stenosis.  DOC at C4-5 with moderate right mild left facet arthropathy with  at least moderate bilateral neuroforaminal stenosis and no central stenosis.  Report does not indicate any high-grade central stenosis throughout or cord signal change or lesions.        Assessment/Plan:   Linda continues with right-sided neck pain most likely coming from C3-4 and C4-5.  She is neurologically stable.  He has failed to get symptom improvement with physical therapy and medication.  Discussed injections and she is agreeable to trying a right C4 MICHELLE.  We will get her set up with Dr. Sutton.  I would like to get plain radiographs today to assess for any instability.  Recommend she continue her medications as current and avoid aggravating activities like overhead lifting.  She will follow-up after the injection with Dr. Mullen.  We will  copies of her CT scan as well as her cervical MRI to bring to next visit.    I advised the patient to call and return sooner for new or worsening complaints of weakness, paresthesias, gait disturbances, or any additional concerns.  Treatment options discussed in detail with Linda and the patient voiced understanding.  Ms. Pickens agrees with this plan of care.     Patient is a nonsmoker    The patient's Body mass index is 28.08 kg/m².. BMI is above normal parameters. Recommendations include: educational material    ADVANCED CARE PLANNING  Information on advance directives provided in AVS.    GIGI Fall Risk Assessment was completed, and patient is at LOW risk for falls.Assessment completed on:9/22/2023       Diagnoses and all orders for this visit:    1. Foraminal stenosis of cervical region (Primary)  -     XR Spine Cervical 2 or 3 View; Future  -     Ambulatory Referral to Pain Management  -     XR Spine Cervical Flex & Ext Only    2. Cervicalgia          I discussed the patients findings and my recommendations with patient    Antonia Castano PA-C  09/22/23  12:26 CDT

## (undated) DEVICE — TOWEL,OR,DSP,ST,BLUE,DLX,4/PK,20PK/CS: Brand: MEDLINE

## (undated) DEVICE — AGENT HEMSTAT W4XL8IN OXIDIZED REGENERATED CELOS ABSRB

## (undated) DEVICE — SUTURE VCRL SZ 3-0 L27IN ABSRB UD L26MM SH 1/2 CIR J416H

## (undated) DEVICE — CLIP INT SM WIDE RED TI TRNSVRS GRV CHEVRON SHP W/ PRECIS

## (undated) DEVICE — Z DISCONTINUED USE 2429233 DRESSING FOAM W10XL10CM 5 LAYR SELF ADH VERSATILE SAFETAC

## (undated) DEVICE — 3M™ IOBAN™ 2 ANTIMICROBIAL INCISE DRAPE 6650EZ: Brand: IOBAN™ 2

## (undated) DEVICE — Device: Brand: LEVEL 1

## (undated) DEVICE — SUTURE PROL SZ 7-0 L24IN NONABSORBABLE BLU L9.3MM BV-1 3/8 M8702

## (undated) DEVICE — MEDI-VAC NON-CONDUCTIVE SUCTION TUBING 6MM X 6.1M (20 FT.) L: Brand: CARDINAL HEALTH

## (undated) DEVICE — SUTURE PERMAHAND SZ 2-0 L30IN NONABSORBABLE BLK SILK W/O A305H

## (undated) DEVICE — SUTURE PERMAHAND SZ 4-0 L12X30IN NONABSORBABLE BLK SILK A303H

## (undated) DEVICE — SOLUTION IV 1000ML 0.9% SOD CHL PH 5 INJ USP VIAFLX PLAS

## (undated) DEVICE — ADHESIVE SKIN CLSR 0.7ML TOP DERMBND ADV

## (undated) DEVICE — SOLUTION IV 500ML 0.9% SOD CHL PH 5 INJ USP VIAFLX PLAS

## (undated) DEVICE — SURGICAL PROCEDURE PACK VASC LOURDES HOSP

## (undated) DEVICE — SUTURE MCRYL SZ 4-0 L18IN ABSRB UD L19MM PS-2 3/8 CIR PRIM Y496G

## (undated) DEVICE — Z INACTIVE USE 2535480 CLIP LIG M BLU TI HRT SHP WIRE HORZ 180 PER BX

## (undated) DEVICE — DRESSING FOAM SELF ADH 20X10 CM ABSORBENT MEPILEX BORDER

## (undated) DEVICE — SHUNT CV SIL EXT LOOP L30CM DIA3X4MM FULL SPR REINF SUNDT

## (undated) DEVICE — SUTURE PROL SZ 6-0 L30IN NONABSORBABLE BLU L13MM RB-2 1/2 8711H

## (undated) DEVICE — SUTURE PERMAHAND SZ 3-0 L30IN NONABSORBABLE BLK SILK BRAID A304H

## (undated) DEVICE — DRAPE: INST MAG 16X20 20/CS: Brand: MEDICAL ACTION INDUSTRIES